# Patient Record
Sex: FEMALE | Race: WHITE | NOT HISPANIC OR LATINO | Employment: PART TIME | URBAN - METROPOLITAN AREA
[De-identification: names, ages, dates, MRNs, and addresses within clinical notes are randomized per-mention and may not be internally consistent; named-entity substitution may affect disease eponyms.]

---
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
## 2020-12-02 ENCOUNTER — HOSPITAL ENCOUNTER (EMERGENCY)
Facility: HOSPITAL | Age: 20
Discharge: HOME | End: 2020-12-02
Attending: EMERGENCY MEDICINE | Admitting: EMERGENCY MEDICINE
Payer: COMMERCIAL

## 2020-12-02 ENCOUNTER — APPOINTMENT (EMERGENCY)
Dept: RADIOLOGY | Facility: HOSPITAL | Age: 20
End: 2020-12-02
Payer: COMMERCIAL

## 2020-12-02 ENCOUNTER — APPOINTMENT (EMERGENCY)
Dept: CT IMAGING | Facility: HOSPITAL | Age: 20
End: 2020-12-02
Payer: COMMERCIAL

## 2020-12-02 VITALS
TEMPERATURE: 97.2 F | RESPIRATION RATE: 16 BRPM | HEART RATE: 87 BPM | DIASTOLIC BLOOD PRESSURE: 81 MMHG | WEIGHT: 154.54 LBS | SYSTOLIC BLOOD PRESSURE: 129 MMHG | OXYGEN SATURATION: 99 % | BODY MASS INDEX: 22.82 KG/M2
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
## 2020-12-02 DIAGNOSIS — M54.2 NECK PAIN: ICD-10-CM

## 2020-12-02 DIAGNOSIS — S16.1XXA STRAIN OF NECK MUSCLE, INITIAL ENCOUNTER: ICD-10-CM

## 2020-12-02 LAB
EXT PREG TEST URINE: NEGATIVE
EXT. CONTROL ED NAV: NORMAL

## 2020-12-02 PROCEDURE — 81025 URINE PREGNANCY TEST: CPT | Performed by: EMERGENCY MEDICINE

## 2020-12-02 PROCEDURE — 73110 X-RAY EXAM OF WRIST: CPT

## 2020-12-02 PROCEDURE — 99284 EMERGENCY DEPT VISIT MOD MDM: CPT

## 2020-12-02 PROCEDURE — 96372 THER/PROPH/DIAG INJ SC/IM: CPT

## 2020-12-02 PROCEDURE — 72125 CT NECK SPINE W/O DYE: CPT

## 2020-12-02 PROCEDURE — 99284 EMERGENCY DEPT VISIT MOD MDM: CPT | Performed by: EMERGENCY MEDICINE

## 2020-12-02 PROCEDURE — 70450 CT HEAD/BRAIN W/O DYE: CPT

## 2020-12-02 RX ORDER — IBUPROFEN 600 MG/1
600 TABLET ORAL EVERY 6 HOURS PRN
Qty: 30 TABLET | Refills: 0 | Status: SHIPPED | OUTPATIENT
Start: 2020-12-02

## 2020-12-02 RX ORDER — CYCLOBENZAPRINE HCL 10 MG
10 TABLET ORAL 2 TIMES DAILY PRN
Qty: 20 TABLET | Refills: 0 | Status: SHIPPED | OUTPATIENT
Start: 2020-12-02

## 2020-12-02 RX ORDER — CYCLOBENZAPRINE HCL 10 MG
10 TABLET ORAL ONCE
Status: COMPLETED | OUTPATIENT
Start: 2020-12-02 | End: 2020-12-02

## 2020-12-02 RX ORDER — KETOROLAC TROMETHAMINE 30 MG/ML
30 INJECTION, SOLUTION INTRAMUSCULAR; INTRAVENOUS ONCE
Status: COMPLETED | OUTPATIENT
Start: 2020-12-02 | End: 2020-12-02

## 2020-12-02 RX ADMIN — CYCLOBENZAPRINE HYDROCHLORIDE 10 MG: 10 TABLET, FILM COATED ORAL at 20:11

## 2020-12-02 RX ADMIN — KETOROLAC TROMETHAMINE 30 MG: 30 INJECTION, SOLUTION INTRAMUSCULAR; INTRAVENOUS at 20:12

## 2022-12-05 ENCOUNTER — TELEPHONE (OUTPATIENT)
Dept: OBGYN CLINIC | Facility: MEDICAL CENTER | Age: 22
End: 2022-12-05

## 2022-12-05 DIAGNOSIS — Z32.01 POSITIVE PREGNANCY TEST: Primary | ICD-10-CM

## 2022-12-05 NOTE — TELEPHONE ENCOUNTER
Pt called stating that she took home  pregnancy test and they were positive, pt was advised of process that we order labs,abt 6 wks after wait for results and then next steps are taken depending on results  Labs were placed for pt to have done  LMP of 10/13/22 was given by patient

## 2022-12-07 ENCOUNTER — TELEPHONE (OUTPATIENT)
Dept: OBGYN CLINIC | Facility: MEDICAL CENTER | Age: 22
End: 2022-12-07

## 2022-12-07 ENCOUNTER — APPOINTMENT (OUTPATIENT)
Dept: LAB | Facility: HOSPITAL | Age: 22
End: 2022-12-07

## 2022-12-07 DIAGNOSIS — Z32.01 POSITIVE PREGNANCY TEST: Primary | ICD-10-CM

## 2022-12-07 DIAGNOSIS — Z32.01 POSITIVE PREGNANCY TEST: ICD-10-CM

## 2022-12-07 LAB
ABO GROUP BLD: NORMAL
B-HCG SERPL-ACNC: ABNORMAL MIU/ML (ref 0–11.6)
BLD GP AB SCN SERPL QL: NEGATIVE
PROGEST SERPL-MCNC: 9.6 NG/ML
RH BLD: POSITIVE
SPECIMEN EXPIRATION DATE: NORMAL

## 2022-12-07 NOTE — TELEPHONE ENCOUNTER
Patient called regarding her blood work results  Explained to patient that once a provider is able to review the results, that someone from the office will reach out to her  Please review when you get a chance  Thank you!

## 2022-12-07 NOTE — TELEPHONE ENCOUNTER
Called patient regarding blood work results, and to schedule a dating ultrasound  Couldn't find an open appointment to schedule in the office, put order in for patient to have ultrasound done with radiology  Informed patient to call Central Scheduling to get appointment scheduled within next week or two  Gave patient number for [facility]  Put order in chart for dating ultrasound

## 2022-12-12 ENCOUNTER — HOSPITAL ENCOUNTER (OUTPATIENT)
Dept: ULTRASOUND IMAGING | Facility: HOSPITAL | Age: 22
Discharge: HOME | End: 2022-12-12
Attending: OBSTETRICS & GYNECOLOGY

## 2022-12-12 DIAGNOSIS — Z32.01 POSITIVE PREGNANCY TEST: ICD-10-CM

## 2022-12-14 NOTE — RESULT ENCOUNTER NOTE
Please call patient with results  US notable for intrauterine pregnancy, 8w0d by this scan   Needs OB Intake and H&P

## 2023-01-06 ENCOUNTER — INITIAL PRENATAL (OUTPATIENT)
Dept: OBGYN CLINIC | Facility: MEDICAL CENTER | Age: 23
End: 2023-01-06

## 2023-01-06 VITALS
BODY MASS INDEX: 28.44 KG/M2 | DIASTOLIC BLOOD PRESSURE: 72 MMHG | WEIGHT: 192 LBS | SYSTOLIC BLOOD PRESSURE: 126 MMHG | HEIGHT: 69 IN

## 2023-01-06 DIAGNOSIS — Z34.01 ENCOUNTER FOR SUPERVISION OF NORMAL FIRST PREGNANCY IN FIRST TRIMESTER: Primary | ICD-10-CM

## 2023-01-06 NOTE — PROGRESS NOTES
OB History    Para Term  AB Living   1             SAB IAB Ectopic Multiple Live Births                  # Outcome Date GA Lbr Cristiano/2nd Weight Sex Delivery Anes PTL Lv   1 Current                  * Pt presents for OB intake  *  *Pt's LMP was Patient's last menstrual period was 10/13/2022 (exact date)  *Ultrasound date:2022   8weeks   *Estimated date of delivery: 2023   * confirmed by lmp    *Signs/Symptoms of Pregnancy   *no Constipation    *no Headaches   *no Cramping  *no Spotting   Diabetes     *no Hx of GDM    *no BMI >35    *no First degree relative with type 2 diabetes   Hypertension-    *no Hx of chronic HTN   *Infection Screening   *no Does the pt have a hx of MRSA? *no History of herpes? *Immunizations:   *yes Discussed influenza vaccine    declined   *yes Discussed TDaP vaccine   *yes COVID Vaccine *declined  [place]  Depression *n   Anxiety*n  Medications*n      *Interview education   *Handouts given:    *Baby and Me support center     *MyChart sign up instructions    *Lab Locations    *[facility] List/Choosing Pediatrician Sheet    *[facility] Childbirth and Parenting Classes    *Schedule for Prenatal Visits    *Pregnancy Warning Signs Reviewed    *Safe Medications During Pregnancy    *SMA and CF Testing information sheet    *CPT Code Sheet/MFM 13week NT pamphlet    *[employer]        SBIRT Screen:0  Depression Screening Follow-up Plan: Patient's depression screening was negative with an [place] score of          *[facility]   *yes discussed genetic testing- pt interested   Patient has been informed of basic prenatal advice such as avoiding alcohol, drugs, and smoking  She should remain hydrated and take daily prenatal vitamins   Patient should avoid caffeine, raw sprouts, high mercury fish, undercooked fish, raw eggs, organ meat, unwashed produce, and unpasteurized cheeses, milk, and fruit juice and undercooked meats  She has been informed about toxoplasmosis and to avoid cat feces  *Details that I feel the provider should be aware of:  Unique Soria was seen for her ob intake at 12w1d  Prenatal panel ordered along with CF/SMA  MFM referral placed    PN1 visit scheduled for *1/20/2023  The patient was oriented to our practice and all questions were answered      Interviewed by:  Shilo Rodriguez

## 2023-01-10 ENCOUNTER — ROUTINE PRENATAL (OUTPATIENT)
Dept: PERINATAL CARE | Facility: OTHER | Age: 23
End: 2023-01-10

## 2023-01-10 ENCOUNTER — APPOINTMENT (OUTPATIENT)
Dept: LAB | Facility: HOSPITAL | Age: 23
End: 2023-01-10
Attending: OBSTETRICS & GYNECOLOGY

## 2023-01-10 VITALS
DIASTOLIC BLOOD PRESSURE: 74 MMHG | HEART RATE: 77 BPM | HEIGHT: 69 IN | SYSTOLIC BLOOD PRESSURE: 134 MMHG | BODY MASS INDEX: 28.41 KG/M2 | WEIGHT: 191.8 LBS

## 2023-01-10 DIAGNOSIS — Z3A.12 12 WEEKS GESTATION OF PREGNANCY: Primary | ICD-10-CM

## 2023-01-10 DIAGNOSIS — Z34.01 ENCOUNTER FOR SUPERVISION OF NORMAL FIRST PREGNANCY IN FIRST TRIMESTER: ICD-10-CM

## 2023-01-10 DIAGNOSIS — Z36.82 NUCHAL TRANSLUCENCY OF FETUS ON PRENATAL ULTRASOUND: ICD-10-CM

## 2023-01-10 DIAGNOSIS — Z13.79 GENETIC SCREENING: ICD-10-CM

## 2023-01-10 LAB
ABO GROUP BLD: NORMAL
BASOPHILS # BLD AUTO: 0.02 THOUSANDS/ÂΜL (ref 0–0.1)
BASOPHILS NFR BLD AUTO: 0 % (ref 0–1)
BILIRUB UR QL STRIP: NEGATIVE
BLD GP AB SCN SERPL QL: NEGATIVE
CLARITY UR: CLEAR
COLOR UR: ABNORMAL
EOSINOPHIL # BLD AUTO: 0.05 THOUSAND/ÂΜL (ref 0–0.61)
EOSINOPHIL NFR BLD AUTO: 1 % (ref 0–6)
ERYTHROCYTE [DISTWIDTH] IN BLOOD BY AUTOMATED COUNT: 12.3 % (ref 11.6–15.1)
GLUCOSE UR STRIP-MCNC: NEGATIVE MG/DL
HCT VFR BLD AUTO: 35.9 % (ref 34.8–46.1)
HGB BLD-MCNC: 12.2 G/DL (ref 11.5–15.4)
HGB UR QL STRIP.AUTO: NEGATIVE
IMM GRANULOCYTES # BLD AUTO: 0.03 THOUSAND/UL (ref 0–0.2)
IMM GRANULOCYTES NFR BLD AUTO: 0 % (ref 0–2)
KETONES UR STRIP-MCNC: ABNORMAL MG/DL
LEUKOCYTE ESTERASE UR QL STRIP: NEGATIVE
LYMPHOCYTES # BLD AUTO: 1.55 THOUSANDS/ÂΜL (ref 0.6–4.47)
LYMPHOCYTES NFR BLD AUTO: 23 % (ref 14–44)
MCH RBC QN AUTO: 30.2 PG (ref 26.8–34.3)
MCHC RBC AUTO-ENTMCNC: 34 G/DL (ref 31.4–37.4)
MCV RBC AUTO: 89 FL (ref 82–98)
MONOCYTES # BLD AUTO: 0.43 THOUSAND/ÂΜL (ref 0.17–1.22)
MONOCYTES NFR BLD AUTO: 6 % (ref 4–12)
NEUTROPHILS # BLD AUTO: 4.81 THOUSANDS/ÂΜL (ref 1.85–7.62)
NEUTS SEG NFR BLD AUTO: 70 % (ref 43–75)
NITRITE UR QL STRIP: NEGATIVE
NRBC BLD AUTO-RTO: 0 /100 WBCS
PH UR STRIP.AUTO: 6.5 [PH]
PLATELET # BLD AUTO: 325 THOUSANDS/UL (ref 149–390)
PMV BLD AUTO: 9.2 FL (ref 8.9–12.7)
PROT UR STRIP-MCNC: NEGATIVE MG/DL
RBC # BLD AUTO: 4.04 MILLION/UL (ref 3.81–5.12)
RH BLD: POSITIVE
SP GR UR STRIP.AUTO: 1.01 (ref 1–1.04)
SPECIMEN EXPIRATION DATE: NORMAL
UROBILINOGEN UA: NEGATIVE MG/DL
WBC # BLD AUTO: 6.89 THOUSAND/UL (ref 4.31–10.16)

## 2023-01-11 PROBLEM — Z3A.12 12 WEEKS GESTATION OF PREGNANCY: Status: ACTIVE | Noted: 2023-01-11

## 2023-01-11 LAB
BACTERIA UR CULT: NORMAL
HBV SURFACE AG SER QL: NORMAL
HIV 1+2 AB+HIV1 P24 AG SERPL QL IA: NORMAL
HIV 2 AB SERPL QL IA: NORMAL
HIV1 AB SERPL QL IA: NORMAL
HIV1 P24 AG SERPL QL IA: NORMAL
RPR SER QL: NORMAL
RUBV IGG SERPL IA-ACNC: 23.2 IU/ML

## 2023-01-16 LAB
CF COMMENT: NORMAL
CFTR MUT ANL BLD/T: NORMAL

## 2023-01-18 LAB
COMMENTX: (FRAGILE X): NORMAL
FMR1 GENE CGG RPT BLD/T QL: NORMAL

## 2023-01-19 LAB
CLINICAL INFO: NORMAL
ETHNIC BACKGROUND STATED: NORMAL
GENE MUT TESTED BLD/T: NORMAL
GENERAL COMMENTS:: NORMAL
LAB DIRECTOR NAME PROVIDER: NORMAL
REASON FOR REFERRAL (NARRATIVE): NORMAL
REF LAB TEST METHOD: NORMAL
SL AMB DISCLAIMER: NORMAL
SL AMB GENETIC COUNSELOR: NORMAL
SMN1 GENE MUT ANL BLD/T: NORMAL
SPECIMEN SOURCE: NORMAL

## 2023-01-20 ENCOUNTER — INITIAL PRENATAL (OUTPATIENT)
Dept: OBGYN CLINIC | Facility: MEDICAL CENTER | Age: 23
End: 2023-01-20

## 2023-01-20 VITALS — WEIGHT: 197.2 LBS | BODY MASS INDEX: 29.12 KG/M2 | SYSTOLIC BLOOD PRESSURE: 132 MMHG | DIASTOLIC BLOOD PRESSURE: 74 MMHG

## 2023-01-20 DIAGNOSIS — Z3A.14 14 WEEKS GESTATION OF PREGNANCY: ICD-10-CM

## 2023-01-20 DIAGNOSIS — Z34.02 ENCOUNTER FOR SUPERVISION OF NORMAL FIRST PREGNANCY IN SECOND TRIMESTER: Primary | ICD-10-CM

## 2023-01-20 NOTE — PROGRESS NOTES
Initial Prenatal Visit  [facility] [address] [place] [address], [place], [address]    Assessment/Plan:  Evon Ma is a 20y o  year old  at 14w1d who presents for initial prenatal visit  Supervision of normal pregnancy  - Prenatal labs reviewed and normal   Blood type: O positive  - SMA, CF, and Fragile X carrier screening was negative  - Aneuploidy screening discussed  Patient declines aneuploidy screening   - Routine cervical cancer screening: Pap has never been done, but patient declines  Recommend Pap at postpartum if patient is declining in pregnancy  - Routine STI Screening: GC/Chlamydia sent today  HIV/Hep B/Syphilis ordered in prenatal panel   - Patient Education: Patient was counseled regarding diet, exercise, weight gain, foods to avoid, vaccines in pregnancy, aneuploidy screening, travel precautions to include seat belt use and VTE risk reduction  She has been provided our pregnancy packet which includes how and when to contact providers, medication recommendations, dietary suggestions, breastfeeding information as well as websites for additional information, hospital and delivery concerns  Additional Pregnancy Problems:   1  Encounter for supervision of normal first pregnancy in second trimester  -     Chlamydia/GC amplified DNA by PCR    2  14 weeks gestation of pregnancy          Subjective:   CC:  Desires prenatal care  Clyde Amador is a 20 y o   female who presents for prenatal care  Pregnancy ROS: Denies leakage of fluid, pelvic pain, or vaginal bleeding  Denies nausea/vomiting      The following portions of the patient's history were reviewed and updated as appropriate: allergies, current medications, past family history, past medical history, obstetric history, gynecologic history, past social history, past surgical history and problem list       Objective:  /74   Wt 89 4 kg (197 lb 3 2 oz)   LMP 10/13/2022 (Exact Date)   BMI 29 12 kg/m² Pregravid Weight/BMI: Pregravid weight not on file (BMI Could not be calculated)  Current Weight: 89 4 kg (197 lb 3 2 oz)   Total Weight Gain: Not found     Pre- Vitals    Flowsheet Row Most Recent Value   Prenatal Assessment    Fetal Heart Rate 157   Prenatal Vitals    Blood Pressure 132/74   Weight - Scale 89 4 kg (197 lb 3 2 oz)   Urine Albumin/Glucose    Dilation/Effacement/Station    Vaginal Drainage    Draining Fluid No   Edema    LLE Edema None   RLE Edema None         General: Well appearing, no distress  Respiratory: Normal respiratory rate, lungs clear to auscultation, no wheezing or rales  Cardiovascular: Regular rate and rhythm, no murmurs, rubs, or gallops  Abdomen: Soft, gravid, nontender    Luke Wilkins MD  [address] [place] [address]  2023 9:50 AM

## 2023-01-21 LAB
C TRACH DNA SPEC QL NAA+PROBE: NEGATIVE
N GONORRHOEA DNA SPEC QL NAA+PROBE: NEGATIVE

## 2023-02-14 ENCOUNTER — ROUTINE PRENATAL (OUTPATIENT)
Dept: OBGYN CLINIC | Facility: MEDICAL CENTER | Age: 23
End: 2023-02-14

## 2023-02-14 VITALS — WEIGHT: 201 LBS | DIASTOLIC BLOOD PRESSURE: 72 MMHG | SYSTOLIC BLOOD PRESSURE: 120 MMHG | BODY MASS INDEX: 29.68 KG/M2

## 2023-02-14 DIAGNOSIS — Z34.02 ENCOUNTER FOR SUPERVISION OF NORMAL FIRST PREGNANCY IN SECOND TRIMESTER: Primary | ICD-10-CM

## 2023-02-14 DIAGNOSIS — Z3A.17 17 WEEKS GESTATION OF PREGNANCY: ICD-10-CM

## 2023-02-14 NOTE — PROGRESS NOTES
Routine Prenatal Visit  [facility] [address] [place] [address]    Assessment/Plan:  Zay Brown is a 20y o  year old  at 17w9d who presents for routine prenatal visit  1  Encounter for supervision of normal first pregnancy in second trimester    2  17 weeks gestation of pregnancy  -     Alpha fetoprotein, maternal; Future          Subjective:     CC: Prenatal care    Aries Swanson is a 20 y o   female who presents for routine prenatal care at 17w5d  Pregnancy ROS: no leakage of fluid, pelvic pain, or vaginal bleeding  some fetal movement  The following portions of the patient's history were reviewed and updated as appropriate: allergies, current medications, past family history, past medical history, obstetric history, gynecologic history, past social history, past surgical history and problem list       Objective:  /72   Wt 91 2 kg (201 lb)   LMP 10/13/2022 (Exact Date)   BMI 29 68 kg/m²   Pregravid Weight/BMI: Pregravid weight not on file (BMI Could not be calculated)  Current Weight: 91 2 kg (201 lb)   Total Weight Gain: Not found  Pre- Vitals    Flowsheet Row Most Recent Value   Prenatal Assessment    Fetal Heart Rate 150   Movement Present   Prenatal Vitals    Blood Pressure 120/72   Weight - Scale 91 2 kg (201 lb)   Urine Albumin/Glucose    Dilation/Effacement/Station    Vaginal Drainage    Edema    LLE Edema None   RLE Edema None   Facial Edema None           General: Well appearing, no distress  Respiratory: Unlabored breathing  Cardiovascular: Regular rate  Abdomen: Soft, gravid, nontender  Fundal Height: Appropriate for gestational age  Extremities: Warm and well perfused  Non tender

## 2023-02-15 ENCOUNTER — TELEPHONE (OUTPATIENT)
Dept: OBGYN CLINIC | Facility: MEDICAL CENTER | Age: 23
End: 2023-02-15

## 2023-02-15 NOTE — TELEPHONE ENCOUNTER
Patient called stating that she was seen in the office yesterday and discuss a problem with acne with you  Patient stated that you mentioned that a gel for acne would be sent to the pharmacy for her, but a prescription was never sent for her  Pharmacy on file, [facility], is correct Pharmacy  Told patient we would reach out back out to her at phone number on file when prescription is sent to pharmacy  Please review when you get a chance  Thank you!

## 2023-02-16 DIAGNOSIS — L70.9 ACNE, UNSPECIFIED ACNE TYPE: Primary | ICD-10-CM

## 2023-02-16 RX ORDER — CLINDAMYCIN PHOSPHATE 10 MG/G
GEL TOPICAL 2 TIMES DAILY
Qty: 60 G | Refills: 1 | Status: SHIPPED | OUTPATIENT
Start: 2023-02-16 | End: 2023-05-16

## 2023-02-21 ENCOUNTER — APPOINTMENT (OUTPATIENT)
Dept: LAB | Facility: HOSPITAL | Age: 23
End: 2023-02-21

## 2023-02-21 DIAGNOSIS — Z3A.17 17 WEEKS GESTATION OF PREGNANCY: ICD-10-CM

## 2023-02-24 LAB
2ND TRIMESTER 4 SCREEN SERPL-IMP: NORMAL
AFP ADJ MOM SERPL: 1.05
AFP INTERP AMN-IMP: NORMAL
AFP INTERP SERPL-IMP: NORMAL
AFP INTERP SERPL-IMP: NORMAL
AFP SERPL-MCNC: 43.9 NG/ML
AGE AT DELIVERY: 24.3 YR
GA METHOD: NORMAL
GA: 18.7 WEEKS
IDDM PATIENT QL: NO
MULTIPLE PREGNANCY: NO
NEURAL TUBE DEFECT RISK FETUS: NORMAL %

## 2023-03-02 ENCOUNTER — ROUTINE PRENATAL (OUTPATIENT)
Dept: PERINATAL CARE | Facility: CLINIC | Age: 23
End: 2023-03-02

## 2023-03-02 VITALS
WEIGHT: 203.7 LBS | DIASTOLIC BLOOD PRESSURE: 64 MMHG | HEART RATE: 88 BPM | SYSTOLIC BLOOD PRESSURE: 124 MMHG | HEIGHT: 69 IN | BODY MASS INDEX: 30.17 KG/M2

## 2023-03-02 DIAGNOSIS — Z3A.20 20 WEEKS GESTATION OF PREGNANCY: ICD-10-CM

## 2023-03-02 DIAGNOSIS — O99.210 OBESITY AFFECTING PREGNANCY, ANTEPARTUM: Primary | ICD-10-CM

## 2023-03-02 DIAGNOSIS — Z36.86 ENCOUNTER FOR ANTENATAL SCREENING FOR CERVICAL LENGTH: ICD-10-CM

## 2023-03-02 NOTE — PROGRESS NOTES
The patient was seen today for an ultrasound  Please see ultrasound report (located under Ob Procedures) for additional details  Thank you very much for allowing us to participate in the care of this very nice patient  Should you have any questions, please do not hesitate to contact me  Jermain Dinh MD [address]  Attending Physician, June

## 2023-03-14 ENCOUNTER — ROUTINE PRENATAL (OUTPATIENT)
Dept: OBGYN CLINIC | Facility: MEDICAL CENTER | Age: 23
End: 2023-03-14

## 2023-03-14 VITALS — DIASTOLIC BLOOD PRESSURE: 74 MMHG | BODY MASS INDEX: 31.1 KG/M2 | WEIGHT: 210.6 LBS | SYSTOLIC BLOOD PRESSURE: 120 MMHG

## 2023-03-14 DIAGNOSIS — Z3A.21 21 WEEKS GESTATION OF PREGNANCY: ICD-10-CM

## 2023-03-14 DIAGNOSIS — Z34.92 SECOND TRIMESTER PREGNANCY: Primary | ICD-10-CM

## 2023-03-14 NOTE — PROGRESS NOTES
Assessment  24 y o   at 21w5d presenting for routine prenatal visit  Plan  Diagnoses and all orders for this visit:    Second trimester pregnancy  21 weeks gestation of pregnancy  - Second trimester precautions  - Level 2 reviewed  - Return in 4wks for PN      ____________________________________________________________        Jam Joyce is a 24 y o   at 21w5d who presents for routine prenatal visit  She is without complaint  She denies contractions, loss of fluid, or vaginal bleeding  She is starting to feel fetal movements; pt able to palpate them, partner cannot yet  Pregnancy Problems:  Patient Active Problem List   Diagnosis   • 21 weeks gestation of pregnancy   • Obesity affecting pregnancy, antepartum         Objective  /74   Wt 95 5 kg (210 lb 9 6 oz)   LMP 10/13/2022 (Exact Date)   BMI 31 10 kg/m²     FHT: 144 BPM   Uterine Size: size equals dates     Physical Exam:  Physical Exam  Constitutional:       General: She is not in acute distress  Appearance: Normal appearance  She is well-developed  She is not ill-appearing, toxic-appearing or diaphoretic  HENT:      Head: Normocephalic and atraumatic  Eyes:      General: No scleral icterus  Right eye: No discharge  Left eye: No discharge  Conjunctiva/sclera: Conjunctivae normal    Pulmonary:      Effort: Pulmonary effort is normal  No accessory muscle usage or respiratory distress  Abdominal:      General: There is distension (gravid)  Tenderness: There is no abdominal tenderness  There is no guarding or rebound  Skin:     General: Skin is warm and dry  Coloration: Skin is not jaundiced  Findings: No bruising, erythema or rash  Neurological:      Mental Status: She is alert  Psychiatric:         Mood and Affect: Mood normal          Behavior: Behavior normal          Thought Content:  Thought content normal          Judgment: Judgment normal

## 2023-05-01 ENCOUNTER — TELEPHONE (OUTPATIENT)
Dept: OBGYN CLINIC | Facility: CLINIC | Age: 23
End: 2023-05-01

## 2023-05-01 ENCOUNTER — CLINICAL SUPPORT (OUTPATIENT)
Dept: POSTPARTUM | Facility: CLINIC | Age: 23
End: 2023-05-01

## 2023-05-01 DIAGNOSIS — Z3A.28 28 WEEKS GESTATION OF PREGNANCY: Primary | ICD-10-CM

## 2023-05-01 DIAGNOSIS — Z34.93 THIRD TRIMESTER PREGNANCY: ICD-10-CM

## 2023-05-01 DIAGNOSIS — Z32.2 ENCOUNTER FOR CHILDBIRTH INSTRUCTION: Primary | ICD-10-CM

## 2023-05-01 NOTE — TELEPHONE ENCOUNTER
Called and left message for patient about 28 week prenatal appointment on 5/2/23  Left message letting patient know that she will need to go to the lab for her glucose testing, that we are no longer drawing blood in the office  Stated that she will need to be at the lab for an hour and that the lab will give her the glucose drink to drink there  Placed lab orders in the chart

## 2023-05-02 ENCOUNTER — APPOINTMENT (OUTPATIENT)
Dept: LAB | Facility: HOSPITAL | Age: 23
End: 2023-05-02

## 2023-05-02 ENCOUNTER — ROUTINE PRENATAL (OUTPATIENT)
Dept: OBGYN CLINIC | Facility: MEDICAL CENTER | Age: 23
End: 2023-05-02

## 2023-05-02 VITALS — SYSTOLIC BLOOD PRESSURE: 110 MMHG | BODY MASS INDEX: 34.05 KG/M2 | DIASTOLIC BLOOD PRESSURE: 70 MMHG | WEIGHT: 230.6 LBS

## 2023-05-02 DIAGNOSIS — Z34.93 THIRD TRIMESTER PREGNANCY: Primary | ICD-10-CM

## 2023-05-02 DIAGNOSIS — Z3A.28 28 WEEKS GESTATION OF PREGNANCY: ICD-10-CM

## 2023-05-02 DIAGNOSIS — Z34.93 THIRD TRIMESTER PREGNANCY: ICD-10-CM

## 2023-05-02 LAB
BASOPHILS # BLD AUTO: 0.04 THOUSANDS/ΜL (ref 0–0.1)
BASOPHILS NFR BLD AUTO: 0 % (ref 0–1)
EOSINOPHIL # BLD AUTO: 0.07 THOUSAND/ΜL (ref 0–0.61)
EOSINOPHIL NFR BLD AUTO: 1 % (ref 0–6)
ERYTHROCYTE [DISTWIDTH] IN BLOOD BY AUTOMATED COUNT: 14 % (ref 11.6–15.1)
GLUCOSE 1H P 50 G GLC PO SERPL-MCNC: 122 MG/DL (ref 40–134)
HCT VFR BLD AUTO: 34.9 % (ref 34.8–46.1)
HGB BLD-MCNC: 11.5 G/DL (ref 11.5–15.4)
IMM GRANULOCYTES # BLD AUTO: 0.07 THOUSAND/UL (ref 0–0.2)
IMM GRANULOCYTES NFR BLD AUTO: 1 % (ref 0–2)
LYMPHOCYTES # BLD AUTO: 1.41 THOUSANDS/ΜL (ref 0.6–4.47)
LYMPHOCYTES NFR BLD AUTO: 13 % (ref 14–44)
MCH RBC QN AUTO: 30 PG (ref 26.8–34.3)
MCHC RBC AUTO-ENTMCNC: 33 G/DL (ref 31.4–37.4)
MCV RBC AUTO: 91 FL (ref 82–98)
MONOCYTES # BLD AUTO: 0.54 THOUSAND/ΜL (ref 0.17–1.22)
MONOCYTES NFR BLD AUTO: 5 % (ref 4–12)
NEUTROPHILS # BLD AUTO: 8.38 THOUSANDS/ΜL (ref 1.85–7.62)
NEUTS SEG NFR BLD AUTO: 80 % (ref 43–75)
NRBC BLD AUTO-RTO: 0 /100 WBCS
PLATELET # BLD AUTO: 354 THOUSANDS/UL (ref 149–390)
PMV BLD AUTO: 9 FL (ref 8.9–12.7)
RBC # BLD AUTO: 3.83 MILLION/UL (ref 3.81–5.12)
WBC # BLD AUTO: 10.51 THOUSAND/UL (ref 4.31–10.16)

## 2023-05-02 NOTE — PROGRESS NOTES
Assessment  24 y o   at 28w5d presenting for routine prenatal visit  Plan  Diagnoses and all orders for this visit:    Third trimester pregnancy  28 weeks gestation of pregnancy  - PTL precautions  - [facility] teaching  - Birth plan given, peds list given, birth consent signed  - 28 wk labs ordered; will be going after visit  - Tdap discussed  Will consider  - Rhogam not indicated  - Return in 2wks for PN    ____________________________________________________________        Geetha Aviles is a 24 y o   at 30w6d who presents for routine prenatal visit  She is without complaint  She denies contractions, loss of fluid, or vaginal bleeding  She feels regular fetal movements  Pregnancy Problems:  Patient Active Problem List   Diagnosis   • 28 weeks gestation of pregnancy   • Obesity affecting pregnancy, antepartum         Objective  /70   Wt 105 kg (230 lb 9 6 oz)   LMP 10/13/2022 (Exact Date)   BMI 34 05 kg/m²     FHT: 150 BPM   Uterine Size: 28 cm     Physical Exam:  Physical Exam  Constitutional:       General: She is not in acute distress  Appearance: Normal appearance  She is well-developed  She is not ill-appearing, toxic-appearing or diaphoretic  HENT:      Head: Normocephalic and atraumatic  Eyes:      General: No scleral icterus  Right eye: No discharge  Left eye: No discharge  Conjunctiva/sclera: Conjunctivae normal    Pulmonary:      Effort: Pulmonary effort is normal  No accessory muscle usage or respiratory distress  Abdominal:      General: There is distension (gravid)  Tenderness: There is no abdominal tenderness  There is no guarding or rebound  Skin:     General: Skin is warm and dry  Coloration: Skin is not jaundiced  Findings: No bruising, erythema or rash  Neurological:      Mental Status: She is alert     Psychiatric:         Mood and Affect: Mood normal          Behavior: Behavior normal          Thought Content: Thought content normal          Judgment: Judgment normal

## 2023-05-16 ENCOUNTER — ROUTINE PRENATAL (OUTPATIENT)
Dept: OBGYN CLINIC | Facility: MEDICAL CENTER | Age: 23
End: 2023-05-16

## 2023-05-16 VITALS — BODY MASS INDEX: 34.7 KG/M2 | SYSTOLIC BLOOD PRESSURE: 128 MMHG | WEIGHT: 235 LBS | DIASTOLIC BLOOD PRESSURE: 60 MMHG

## 2023-05-16 DIAGNOSIS — Z34.03 ENCOUNTER FOR SUPERVISION OF NORMAL FIRST PREGNANCY IN THIRD TRIMESTER: Primary | ICD-10-CM

## 2023-05-16 DIAGNOSIS — Z3A.30 30 WEEKS GESTATION OF PREGNANCY: ICD-10-CM

## 2023-05-16 NOTE — PATIENT INSTRUCTIONS
Fetal Movement   WHAT YOU NEED TO KNOW:   Fetal movements are the kicks, rolls, and hiccups of your unborn baby  You may start to feel these movements when you are 20 weeks pregnant  The movements grow stronger and more frequent as your baby grows  Fetal movements show that your unborn baby is getting the oxygen and nutrients he or she needs before birth  Fewer fetal movements may signal a problem with your baby's health  DISCHARGE INSTRUCTIONS:   Follow up with your doctor or obstetrician as directed:  Write down your questions so you remember to ask them during your visits  Normal fetal movement:  Fetal activity can be described by 4 states, from least to most active  During quiet sleep, your unborn baby may be still for up to 2 hours  During active sleep, he or she kicks, rolls, and moves often  During the quiet awake state, he or she may only move his or her eyes  The active awake state includes strong kicks and rolls  What affects fetal movement:  You may feel your baby move more after you eat, or after you drink caffeine  You may feel your baby move less while you are more active, such as when you exercise  You may also feel fewer movements if you are obese  Certain medicines can change your baby's movements  Tell your healthcare provider about the medicines you are taking  Track fetal movements at home:  Fetal movement is most often felt when you lie quietly on your side  Your healthcare provider may ask you to count movements for 2 hours  He or she may ask you to track how long it takes for your baby to move 10 times  Keep a log of your baby's movements  Contact your doctor or obstetrician if:   It takes longer than usual to feel 8 of your unborn baby's movements  You do not feel your unborn baby move at least 10 times in 2 hours  The skin on your hands, feet, and around your eyes is more swollen than usual     You have a headache for at least 24 hours      Tiny red dots appear on your skin     Your belly is tender when you press on it  You have questions or concerns about your condition or care  Return to the emergency department if:   You do not feel your unborn baby move for 12 hours  You feel cramping or constant pain in your abdomen  You have heavy bleeding from your vagina  You have a severe headache and cannot see clearly  You are having trouble breathing or are vomiting  You have a seizure  © Copyright Des Reddy 2022 Information is for End User's use only and may not be sold, redistributed or otherwise used for commercial purposes  The above information is an  only  It is not intended as medical advice for individual conditions or treatments  Talk to your doctor, nurse or pharmacist before following any medical regimen to see if it is safe and effective for you  Early Labor Signs   WHAT YOU NEED TO KNOW:   Early labor signs can happen weeks, days, or hours before your baby is ready to be delivered  You may have false labor signs, which are also called Beecher Peterson contractions  False labor is common and may happen several weeks or days before your actual labor  The contractions are not regular, and do not get closer together  The pain is usually mild, does not worsen, and is felt only in front  Patrick Peterson contractions may happen later in the day, and stop after you change position, walk, or rest   DISCHARGE INSTRUCTIONS:   Call 911 for any of the following: You have heavy vaginal bleeding  You cannot get to the hospital before the baby starts to come out  Return to the emergency department if:   You have regular, painful contractions that are less than 5 minutes apart and last 30 to 70 seconds each  You have a constant trickle or sudden gush of clear fluid from your vagina  You notice a sudden decrease in your baby's movement      Contact your obstetrician or healthcare provider if:   You have pain in your lower back or abdomen that does not get better when you change positions  You have bloody mucus or show  You have questions or concerns about your condition or care  Early Labor signs and symptoms:   Lightening  occurs when your baby drops inside your pelvis  You may feel increased pressure in your pelvis  This may happen a few weeks to a few hours before your labor begins  Contractions  are cramps and tightening that occur in your uterus to help move the baby through your birth canal  Contractions occur regularly and more often each time  Each one lasts about 30 to 70 seconds, and gets stronger until you deliver your baby  Contractions do not go away with movement  The pain usually starts in your lower back and moves to your abdomen  Effacement  occurs when your cervix softens and thins, so it can easily open for the baby  You will not be able to feel effacement  Your healthcare provider will examine your cervix for effacement  Dilation  is widening of your cervix  Your healthcare provider will examine your cervix for dilation  Your cervix may start to dilate weeks before your baby is delivered  Your cervix will be fully opened and ready for delivery when it is dilated to 10 centimeters  Increased discharge  from your vagina may occur  It may be brown, pink, clear, or slightly bloody  This discharge may also be called bloody show  Bloody show is a mucus plug that forms and blocks your cervix during pregnancy  The discharge may mean that your cervix is opening up and getting ready for delivery  Rupture of membranes  is a sudden release of clear fluid from your vagina  Ruptured membranes means your water broke  Your healthcare provider may need to break your water if it does not happen on its own  © Copyright James Bains 2022 Information is for End User's use only and may not be sold, redistributed or otherwise used for commercial purposes  The above information is an  only   It is not intended as medical advice for individual conditions or treatments  Talk to your doctor, nurse or pharmacist before following any medical regimen to see if it is safe and effective for you

## 2023-05-16 NOTE — PROGRESS NOTES
Routine Prenatal Visit  [facility] [address] [place] [address]    Assessment/Plan:  Neno Henriquez is a 24y o  year old  at 30w7d who presents for routine prenatal visit  1  Encounter for supervision of normal first pregnancy in third trimester    2  30 weeks gestation of pregnancy          Subjective:     CC: Prenatal care    Shahid Salazar is a 24 y o   female who presents for routine prenatal care at 30w5d  Pregnancy ROS: no leakage of fluid, pelvic pain, or vaginal bleeding   good fetal movement  Reports LE swelling, mostly at the end of the day  discussed elevated of LE, compression stockings  Birth plan reviewed  All questions answered    The following portions of the patient's history were reviewed and updated as appropriate: allergies, current medications, past family history, past medical history, obstetric history, gynecologic history, past social history, past surgical history and problem list       Objective:  /60   Wt 107 kg (235 lb)   LMP 10/13/2022 (Exact Date)   BMI 34 70 kg/m²   Pregravid Weight/BMI: Pregravid weight not on file (BMI Could not be calculated)  Current Weight: 107 kg (235 lb)   Total Weight Gain: Not found  Pre-Darren Vitals    Flowsheet Row Most Recent Value   Prenatal Assessment    Fetal Heart Rate 157   Movement Present   Prenatal Vitals    Blood Pressure 128/60   Weight - Scale 107 kg (235 lb)   Urine Albumin/Glucose    Dilation/Effacement/Station    Vaginal Drainage    Edema    LLE Edema Trace   RLE Edema Trace   Facial Edema None           General: Well appearing, no distress  Respiratory: Unlabored breathing  Cardiovascular: Regular rate  Abdomen: Soft, gravid, nontender  Fundal Height: Appropriate for gestational age  Extremities: Warm and well perfused  Non tender

## 2023-05-23 ENCOUNTER — ULTRASOUND (OUTPATIENT)
Dept: PERINATAL CARE | Facility: CLINIC | Age: 23
End: 2023-05-23

## 2023-05-23 VITALS
SYSTOLIC BLOOD PRESSURE: 128 MMHG | BODY MASS INDEX: 35.37 KG/M2 | DIASTOLIC BLOOD PRESSURE: 82 MMHG | WEIGHT: 238.8 LBS | HEART RATE: 98 BPM | HEIGHT: 69 IN

## 2023-05-23 DIAGNOSIS — Z3A.31 31 WEEKS GESTATION OF PREGNANCY: ICD-10-CM

## 2023-05-23 DIAGNOSIS — O36.5930 POOR FETAL GROWTH AFFECTING MANAGEMENT OF MOTHER IN THIRD TRIMESTER, SINGLE OR UNSPECIFIED FETUS: ICD-10-CM

## 2023-05-23 DIAGNOSIS — Z36.89 ENCOUNTER FOR ULTRASOUND TO CHECK FETAL GROWTH: ICD-10-CM

## 2023-05-23 DIAGNOSIS — O99.210 OBESITY AFFECTING PREGNANCY, ANTEPARTUM: Primary | ICD-10-CM

## 2023-05-23 NOTE — PATIENT INSTRUCTIONS
Kick Counts in Pregnancy   WHAT YOU NEED TO KNOW:   What do I need to know about kick counts? Kick counts measure how much your baby is moving in your womb  A kick from your baby can be felt as a twist, turn, swish, roll, or jab  It is common to feel your baby kicking at 26 to 28 weeks of pregnancy  You may feel your baby kick as early as 20 weeks of pregnancy  You may want to start counting at 28 weeks  Why should I measure kick counts? Your baby's movement may provide information about your baby's health  He or she may move less, or not at all, if there are problems  Your baby may move less if he or she is not getting enough oxygen or nutrition from the placenta  Do not smoke while you are pregnant  Smoking decreases the amount of oxygen that gets to your baby  Talk to your healthcare provider if you need help to quit smoking  Tell your healthcare provider as soon as you feel a change in your baby's movements  When do I measure kick counts? Measure kick counts at the same time every day  Measure kick counts when your baby is awake and most active  Your baby may be most active in the evening  How do I measure kick counts? Check that your baby is awake before you measure kick counts  You can wake up your baby by lightly pushing on your belly, walking, or drinking something cold  Your healthcare provider may tell you different ways to measure kick counts  You may be told to do the following:  Use a chart or clock to keep track of the time you start and finish counting  Sit in a chair or lie on your left side  Place your hands on the largest part of your belly  Count until you reach 10 kicks  Write down how much time it takes to count 10 kicks  It may take 30 minutes to 2 hours to count 10 kicks  It should not take more than 2 hours to count 10 kicks  When should I contact my doctor? You feel a change in the number of kicks or movements of your baby       You feel fewer than 10 kicks within 2 hours  You have questions or concerns about your baby's movements  CARE AGREEMENT:   You have the right to help plan your care  Learn about your health condition and how it may be treated  Discuss treatment options with your healthcare providers to decide what care you want to receive  You always have the right to refuse treatment  The above information is an  only  It is not intended as medical advice for individual conditions or treatments  Talk to your doctor, nurse or pharmacist before following any medical regimen to see if it is safe and effective for you  © Copyright [facility] 2022 Information is for End User's use only and may not be sold, redistributed or otherwise used for commercial purposes  Kick Counts in Pregnancy   WHAT YOU NEED TO KNOW:   What do I need to know about kick counts? Kick counts measure how much your baby is moving in your womb  A kick from your baby can be felt as a twist, turn, swish, roll, or jab  It is common to feel your baby kicking at 26 to 28 weeks of pregnancy  You may feel your baby kick as early as 20 weeks of pregnancy  You may want to start counting at 28 weeks  Why should I measure kick counts? Your baby's movement may provide information about your baby's health  He or she may move less, or not at all, if there are problems  Your baby may move less if he or she is not getting enough oxygen or nutrition from the placenta  Do not smoke while you are pregnant  Smoking decreases the amount of oxygen that gets to your baby  Talk to your healthcare provider if you need help to quit smoking  Tell your healthcare provider as soon as you feel a change in your baby's movements  When do I measure kick counts? Measure kick counts at the same time every day  Measure kick counts when your baby is awake and most active  Your baby may be most active in the evening  How do I measure kick counts?   Check that your baby is awake before you measure kick counts  You can wake up your baby by lightly pushing on your belly, walking, or drinking something cold  Your healthcare provider may tell you different ways to measure kick counts  You may be told to do the following:  Use a chart or clock to keep track of the time you start and finish counting  Sit in a chair or lie on your left side  Place your hands on the largest part of your belly  Count until you reach 10 kicks  Write down how much time it takes to count 10 kicks  It may take 30 minutes to 2 hours to count 10 kicks  It should not take more than 2 hours to count 10 kicks  When should I contact my doctor? You feel a change in the number of kicks or movements of your baby  You feel fewer than 10 kicks within 2 hours  You have questions or concerns about your baby's movements  CARE AGREEMENT:   You have the right to help plan your care  Learn about your health condition and how it may be treated  Discuss treatment options with your healthcare providers to decide what care you want to receive  You always have the right to refuse treatment  The above information is an  only  It is not intended as medical advice for individual conditions or treatments  Talk to your doctor, nurse or pharmacist before following any medical regimen to see if it is safe and effective for you  © Copyright Sara Parvez 2022 Information is for End User's use only and may not be sold, redistributed or otherwise used for commercial purposes

## 2023-05-23 NOTE — LETTER
NST sleeve cover sheet    Patient name: Annette Flores  : 2000  MRN: [id number]    MATT: Estimated Date of Delivery: 23    Obstetrician: ______OBGYN    Reason(s) for testing:  ______________FGR      Testing frequency:    ___ 2x/wk  __x_ 1x/wk  __x_ Dopplers  ___ BPP?       Last growth scan: __________________________________________

## 2023-05-23 NOTE — PROGRESS NOTES
"[address]: Ms Nanda Gallegos was seen today for umbilical artery Doppler study, JOSE ALBERTO, NST, and growth ultrasound  See ultrasound report under \"OB Procedures\" tab  MDM:   I  Diagnoses/Problems addressed:  fetal growth restriction  II  Data: glucola, prior US report, no anueploidy screening  III  Risk of morbidity: Low    Please don't hesitate to contact our office with any concerns or questions    -Juanita Boss MD      "

## 2023-05-30 ENCOUNTER — ROUTINE PRENATAL (OUTPATIENT)
Dept: OBGYN CLINIC | Facility: MEDICAL CENTER | Age: 23
End: 2023-05-30

## 2023-05-30 VITALS — DIASTOLIC BLOOD PRESSURE: 82 MMHG | SYSTOLIC BLOOD PRESSURE: 126 MMHG | BODY MASS INDEX: 35.44 KG/M2 | WEIGHT: 240 LBS

## 2023-05-30 DIAGNOSIS — O36.5930 POOR FETAL GROWTH AFFECTING MANAGEMENT OF MOTHER IN THIRD TRIMESTER, SINGLE OR UNSPECIFIED FETUS: ICD-10-CM

## 2023-05-30 DIAGNOSIS — Z3A.32 32 WEEKS GESTATION OF PREGNANCY: Primary | ICD-10-CM

## 2023-05-30 NOTE — PATIENT INSTRUCTIONS
Kick Counts in Pregnancy   WHAT YOU NEED TO KNOW:   Kick counts measure how much your baby is moving in your womb  A kick from your baby can be felt as a twist, turn, swish, roll, or jab  It is common to feel your baby kicking at 26 to 28 weeks of pregnancy  You may feel your baby kick as early as 20 weeks of pregnancy  You may want to start counting at 28 weeks  DISCHARGE INSTRUCTIONS:   Contact your doctor immediately if:   You feel a change in the number of kicks or movements of your baby  You feel fewer than 10 kicks within 2 hours  You have questions or concerns about your baby's movements  Why measure kick counts:  Your baby's movement may provide information about your baby's health  He or she may move less, or not at all, if there are problems  Your baby may move less if he or she is not getting enough oxygen or nutrition from the placenta  Do not smoke while you are pregnant  Smoking decreases the amount of oxygen that gets to your baby  Talk to your healthcare provider if you need help to quit smoking  Tell your healthcare provider as soon as you feel a change in your baby's movements  When to measure kick counts:   Measure kick counts at the same time every day  Measure kick counts when your baby is awake and most active  Your baby may be most active in the evening  How to measure kick counts:  Check that your baby is awake before you measure kick counts  You can wake up your baby by lightly pushing on your belly, walking, or drinking something cold  Your healthcare provider may tell you different ways to measure kick counts  You may be told to do the following:  Use a chart or clock to keep track of the time you start and finish counting  Sit in a chair or lie on your left side  Place your hands on the largest part of your belly  Count until you reach 10 kicks  Write down how much time it takes to count 10 kicks  It may take 30 minutes to 2 hours to count 10 kicks  It should not take more than 2 hours to count 10 kicks  Follow up with your doctor as directed:  Write down your questions so you remember to ask them during your visits  © Copyright Mina Carranza 2022 Information is for End User's use only and may not be sold, redistributed or otherwise used for commercial purposes  The above information is an  only  It is not intended as medical advice for individual conditions or treatments  Talk to your doctor, nurse or pharmacist before following any medical regimen to see if it is safe and effective for you

## 2023-05-30 NOTE — PROGRESS NOTES
Jaspal Acuna is a 24y o  year old  at 30w10d for routine prenatal visit    + FM, no vaginal bleeding, contractions, or LOF  Complaints: No   IUGR - following with MFM weekly ,current recommendation is for IOL from 38 weeks to 38 weeks 6 days - messaged coordinator to schedule for  (PM) / has not been confirmed    Most recent ultrasound and labs reviewed    [address] reviewed

## 2023-05-31 ENCOUNTER — TELEPHONE (OUTPATIENT)
Dept: OBGYN CLINIC | Facility: MEDICAL CENTER | Age: 23
End: 2023-05-31

## 2023-05-31 NOTE — TELEPHONE ENCOUNTER
----- Message from Grandville Councilman, MD sent at 5/30/2023 11:10 AM EDT -----  Regarding: Sandi Garcia can this patient be scheduled for IOL July 6 (PM slot)   Indication is IUGR thanks

## 2023-06-01 ENCOUNTER — ULTRASOUND (OUTPATIENT)
Age: 23
End: 2023-06-01

## 2023-06-01 VITALS
DIASTOLIC BLOOD PRESSURE: 62 MMHG | SYSTOLIC BLOOD PRESSURE: 136 MMHG | HEART RATE: 106 BPM | WEIGHT: 244.6 LBS | BODY MASS INDEX: 36.23 KG/M2 | HEIGHT: 69 IN

## 2023-06-01 DIAGNOSIS — Z3A.33 33 WEEKS GESTATION OF PREGNANCY: ICD-10-CM

## 2023-06-01 DIAGNOSIS — O36.5930 POOR FETAL GROWTH AFFECTING MANAGEMENT OF MOTHER IN THIRD TRIMESTER, SINGLE OR UNSPECIFIED FETUS: ICD-10-CM

## 2023-06-01 DIAGNOSIS — O99.210 OBESITY AFFECTING PREGNANCY, ANTEPARTUM: Primary | ICD-10-CM

## 2023-06-01 NOTE — PROGRESS NOTES
"[facility]: Ms Giuliana Evans was seen today for umbilical artery Doppler study, JOSE ALBERTO, and NST  See ultrasound report under \"OB Procedures\" tab  Please don't hesitate to contact our office with any concerns or questions    -Robinson Jefferson MD      "

## 2023-06-01 NOTE — LETTER
NST sleeve cover sheet    Patient name: Roman Quigley  : 2000  MRN: [id number]    MATT: Estimated Date of Delivery: 23    Obstetrician: [facility] ______________________    Reason(s) for testing:  _________________FGR_____________________      Testing frequency:    ___ 2x/wk  ___ 1x/wk  ___ Dopplers  ___ BPP?       Last growth scan: __________________________________________

## 2023-06-05 ENCOUNTER — CLINICAL SUPPORT (OUTPATIENT)
Dept: POSTPARTUM | Facility: CLINIC | Age: 23
End: 2023-06-05

## 2023-06-05 DIAGNOSIS — Z32.2 ENCOUNTER FOR CHILDBIRTH INSTRUCTION: Primary | ICD-10-CM

## 2023-06-05 NOTE — PROGRESS NOTES
Please refer to the [facility] ultrasound report in Ob Procedures for additional information regarding today's visit

## 2023-06-06 ENCOUNTER — ULTRASOUND (OUTPATIENT)
Age: 23
End: 2023-06-06
Payer: COMMERCIAL

## 2023-06-06 VITALS
HEART RATE: 74 BPM | SYSTOLIC BLOOD PRESSURE: 128 MMHG | BODY MASS INDEX: 35.99 KG/M2 | HEIGHT: 69 IN | WEIGHT: 243 LBS | DIASTOLIC BLOOD PRESSURE: 66 MMHG

## 2023-06-06 DIAGNOSIS — O36.5930 INTRAUTERINE GROWTH RESTRICTION AFFECTING ANTEPARTUM CARE OF MOTHER IN THIRD TRIMESTER, SINGLE OR UNSPECIFIED FETUS: ICD-10-CM

## 2023-06-06 DIAGNOSIS — Z3A.33 33 WEEKS GESTATION OF PREGNANCY: Primary | ICD-10-CM

## 2023-06-06 PROCEDURE — 59025 FETAL NON-STRESS TEST: CPT | Performed by: OBSTETRICS & GYNECOLOGY

## 2023-06-06 PROCEDURE — 76815 OB US LIMITED FETUS(S): CPT | Performed by: OBSTETRICS & GYNECOLOGY

## 2023-06-06 PROCEDURE — 76820 UMBILICAL ARTERY ECHO: CPT | Performed by: OBSTETRICS & GYNECOLOGY

## 2023-06-06 NOTE — LETTER
June 6, 2023     GÓMEZ Morelos  [address]  [address] [facility]    Patient: Maine Kendrick   YOB: 2000   Date of Visit: 6/6/2023       Dear Dr Tavares Early: Thank you for referring Maine Kendrick to me for evaluation  Below are my notes for this consultation  If you have questions, please do not hesitate to call me  I look forward to following your patient along with you           Sincerely,        Giselle Everett MD        CC: No Recipients    Giselle Everett MD  6/5/2023  5:54 PM  Sign when Signing Visit  Please refer to the [facility] ultrasound report in Ob Procedures for additional information regarding today's visit

## 2023-06-13 ENCOUNTER — ROUTINE PRENATAL (OUTPATIENT)
Dept: OBGYN CLINIC | Facility: MEDICAL CENTER | Age: 23
End: 2023-06-13

## 2023-06-13 VITALS — WEIGHT: 243 LBS | DIASTOLIC BLOOD PRESSURE: 82 MMHG | SYSTOLIC BLOOD PRESSURE: 126 MMHG | BODY MASS INDEX: 35.88 KG/M2

## 2023-06-13 DIAGNOSIS — Z3A.34 34 WEEKS GESTATION OF PREGNANCY: Primary | ICD-10-CM

## 2023-06-13 DIAGNOSIS — O36.5930 POOR FETAL GROWTH AFFECTING MANAGEMENT OF MOTHER IN THIRD TRIMESTER, SINGLE OR UNSPECIFIED FETUS: ICD-10-CM

## 2023-06-13 PROCEDURE — PNV: Performed by: OBSTETRICS & GYNECOLOGY

## 2023-06-13 NOTE — PROGRESS NOTES
Edi Valladares is a 24y o  year old  at 35w7d for routine prenatal visit    + FM, no vaginal bleeding, contractions, or LOF  Complaints: No   Most recent ultrasound and labs reviewed    [address] reviewed   Growth restriction - has appt at [facility] /for weekly surveillance doppler and APFS , has IOL scheduled   @ 8 pm

## 2023-06-13 NOTE — PATIENT INSTRUCTIONS
Kick Counts in Pregnancy   WHAT YOU NEED TO KNOW:   Kick counts measure how much your baby is moving in your womb  A kick from your baby can be felt as a twist, turn, swish, roll, or jab  It is common to feel your baby kicking at 26 to 28 weeks of pregnancy  You may feel your baby kick as early as 20 weeks of pregnancy  You may want to start counting at 28 weeks  DISCHARGE INSTRUCTIONS:   Contact your doctor immediately if:   You feel a change in the number of kicks or movements of your baby  You feel fewer than 10 kicks within 2 hours  You have questions or concerns about your baby's movements  Why measure kick counts:  Your baby's movement may provide information about your baby's health  He or she may move less, or not at all, if there are problems  Your baby may move less if he or she is not getting enough oxygen or nutrition from the placenta  Do not smoke while you are pregnant  Smoking decreases the amount of oxygen that gets to your baby  Talk to your healthcare provider if you need help to quit smoking  Tell your healthcare provider as soon as you feel a change in your baby's movements  When to measure kick counts:   Measure kick counts at the same time every day  Measure kick counts when your baby is awake and most active  Your baby may be most active in the evening  How to measure kick counts:  Check that your baby is awake before you measure kick counts  You can wake up your baby by lightly pushing on your belly, walking, or drinking something cold  Your healthcare provider may tell you different ways to measure kick counts  You may be told to do the following:  Use a chart or clock to keep track of the time you start and finish counting  Sit in a chair or lie on your left side  Place your hands on the largest part of your belly  Count until you reach 10 kicks  Write down how much time it takes to count 10 kicks  It may take 30 minutes to 2 hours to count 10 kicks  It should not take more than 2 hours to count 10 kicks  Follow up with your doctor as directed:  Write down your questions so you remember to ask them during your visits  © Copyright [employer] 2022 Information is for End User's use only and may not be sold, redistributed or otherwise used for commercial purposes  The above information is an  only  It is not intended as medical advice for individual conditions or treatments  Talk to your doctor, nurse or pharmacist before following any medical regimen to see if it is safe and effective for you

## 2023-06-15 ENCOUNTER — ULTRASOUND (OUTPATIENT)
Dept: PERINATAL CARE | Facility: CLINIC | Age: 23
End: 2023-06-15
Payer: COMMERCIAL

## 2023-06-15 VITALS
BODY MASS INDEX: 36.26 KG/M2 | SYSTOLIC BLOOD PRESSURE: 130 MMHG | DIASTOLIC BLOOD PRESSURE: 78 MMHG | HEART RATE: 88 BPM | HEIGHT: 69 IN | WEIGHT: 244.8 LBS

## 2023-06-15 DIAGNOSIS — Z3A.35 35 WEEKS GESTATION OF PREGNANCY: ICD-10-CM

## 2023-06-15 DIAGNOSIS — O99.210 OBESITY AFFECTING PREGNANCY, ANTEPARTUM: Primary | ICD-10-CM

## 2023-06-15 PROCEDURE — 76816 OB US FOLLOW-UP PER FETUS: CPT | Performed by: OBSTETRICS & GYNECOLOGY

## 2023-06-15 PROCEDURE — 99213 OFFICE O/P EST LOW 20 MIN: CPT | Performed by: OBSTETRICS & GYNECOLOGY

## 2023-06-15 NOTE — PROGRESS NOTES
A fetal ultrasound was completed  See Ob procedures in Epic for an interpretation and recommendations  Do not hesitate to contact us in [facility] if you have questions  Dilip Phillips MD, [address] [facility]  Maternal Fetal Medicine

## 2023-06-19 ENCOUNTER — CLINICAL SUPPORT (OUTPATIENT)
Age: 23
End: 2023-06-19

## 2023-06-19 DIAGNOSIS — Z32.2 ENCOUNTER FOR CHILDBIRTH INSTRUCTION: Primary | ICD-10-CM

## 2023-06-27 ENCOUNTER — ROUTINE PRENATAL (OUTPATIENT)
Dept: OBGYN CLINIC | Facility: MEDICAL CENTER | Age: 23
End: 2023-06-27

## 2023-06-27 VITALS — BODY MASS INDEX: 36.34 KG/M2 | WEIGHT: 246.1 LBS | SYSTOLIC BLOOD PRESSURE: 114 MMHG | DIASTOLIC BLOOD PRESSURE: 68 MMHG

## 2023-06-27 DIAGNOSIS — Z3A.36 36 WEEKS GESTATION OF PREGNANCY: ICD-10-CM

## 2023-06-27 DIAGNOSIS — Z34.93 THIRD TRIMESTER PREGNANCY: Primary | ICD-10-CM

## 2023-06-27 DIAGNOSIS — O36.5930 POOR FETAL GROWTH AFFECTING MANAGEMENT OF MOTHER IN THIRD TRIMESTER, SINGLE OR UNSPECIFIED FETUS: ICD-10-CM

## 2023-06-27 PROCEDURE — 87150 DNA/RNA AMPLIFIED PROBE: CPT | Performed by: OBSTETRICS & GYNECOLOGY

## 2023-06-27 PROCEDURE — PNV: Performed by: OBSTETRICS & GYNECOLOGY

## 2023-06-27 NOTE — PROGRESS NOTES
Assessment  24 y o   at 44w9d presenting for routine prenatal visit  Plan  Diagnoses and all orders for this visit:    Third trimester pregnancy  36 weeks gestation of pregnancy  -     PTL precautions  - FKC  - Strep B DNA probe, amplification  - Return in 1wk for PN    Poor fetal growth affecting management of mother in third trimester, single or unspecified fetus  - Initial concern for IUGR; growth US last wnl (EFW 62%)  - Continue f/u with MFM and repeat growth upcoming  - NSTs scheduled  - IOL scheduled; may retime pending MFM recs after next US      ____________________________________________________________        Migdalia Alvarez is a 24 y o   at 44w9d who presents for routine prenatal visit  She is without complaint  She has some cramping  Denies regular contractions, loss of fluid, or vaginal bleeding  She feels regular fetal movements  Pregnancy Problems:  Patient Active Problem List   Diagnosis   • 36 weeks gestation of pregnancy   • Obesity affecting pregnancy, antepartum   • Poor fetal growth affecting management of mother in third trimester         Objective  /68   Wt 112 kg (246 lb 1 6 oz)   LMP 10/13/2022 (Exact Date)   BMI 36 34 kg/m²     FHT: 127 BPM   Uterine Size: size equals dates   Presentations: cephalic   Pelvic Exam:     Dilation: 1cm    Effacement: Thick    Station:  -2    Consistency: medium    Position: posterior     Physical Exam:  Physical Exam  Exam conducted with a chaperone present  Constitutional:       General: She is not in acute distress  Appearance: Normal appearance  She is well-developed  She is not ill-appearing, toxic-appearing or diaphoretic  HENT:      Head: Normocephalic and atraumatic  Eyes:      General: No scleral icterus  Right eye: No discharge  Left eye: No discharge        Conjunctiva/sclera: Conjunctivae normal    Pulmonary:      Effort: Pulmonary effort is normal  No accessory muscle usage or respiratory distress  Abdominal:      General: There is distension (gravid)  Tenderness: There is no abdominal tenderness  There is no guarding or rebound  Genitourinary:     General: Normal vulva  Exam position: Lithotomy position  Labia:         Right: No rash, tenderness or lesion  Left: No rash, tenderness or lesion  Urethra: No prolapse, urethral swelling or urethral lesion  Skin:     General: Skin is warm and dry  Coloration: Skin is not jaundiced  Findings: No bruising, erythema or rash  Neurological:      Mental Status: She is alert  Psychiatric:         Mood and Affect: Mood normal          Behavior: Behavior normal          Thought Content:  Thought content normal          Judgment: Judgment normal

## 2023-06-29 ENCOUNTER — ULTRASOUND (OUTPATIENT)
Dept: PERINATAL CARE | Facility: CLINIC | Age: 23
End: 2023-06-29
Payer: COMMERCIAL

## 2023-06-29 VITALS
HEART RATE: 80 BPM | BODY MASS INDEX: 36.91 KG/M2 | SYSTOLIC BLOOD PRESSURE: 126 MMHG | WEIGHT: 249.2 LBS | DIASTOLIC BLOOD PRESSURE: 64 MMHG | HEIGHT: 69 IN

## 2023-06-29 DIAGNOSIS — Z3A.37 37 WEEKS GESTATION OF PREGNANCY: Primary | ICD-10-CM

## 2023-06-29 DIAGNOSIS — O99.210 OBESITY AFFECTING PREGNANCY, ANTEPARTUM: ICD-10-CM

## 2023-06-29 DIAGNOSIS — Z36.89 ENCOUNTER FOR ULTRASOUND TO CHECK FETAL GROWTH: ICD-10-CM

## 2023-06-29 PROBLEM — O36.5930 POOR FETAL GROWTH AFFECTING MANAGEMENT OF MOTHER IN THIRD TRIMESTER: Status: RESOLVED | Noted: 2023-05-23 | Resolved: 2023-06-29

## 2023-06-29 LAB — GP B STREP DNA SPEC QL NAA+PROBE: NEGATIVE

## 2023-06-29 PROCEDURE — 76816 OB US FOLLOW-UP PER FETUS: CPT | Performed by: STUDENT IN AN ORGANIZED HEALTH CARE EDUCATION/TRAINING PROGRAM

## 2023-06-29 NOTE — PROGRESS NOTES
"[address]: Ms Sarai Chatman was seen today for fetal growth assessment ultrasound  See ultrasound report under \"OB Procedures\" tab  Please don't hesitate to contact our office with any concerns or questions    -Mi Flores MD      "

## 2023-07-03 ENCOUNTER — ROUTINE PRENATAL (OUTPATIENT)
Dept: OBGYN CLINIC | Facility: MEDICAL CENTER | Age: 23
End: 2023-07-03

## 2023-07-03 VITALS
WEIGHT: 250.8 LBS | DIASTOLIC BLOOD PRESSURE: 78 MMHG | SYSTOLIC BLOOD PRESSURE: 120 MMHG | HEIGHT: 69 IN | BODY MASS INDEX: 37.15 KG/M2

## 2023-07-03 DIAGNOSIS — Z3A.37 37 WEEKS GESTATION OF PREGNANCY: ICD-10-CM

## 2023-07-03 DIAGNOSIS — O99.210 OBESITY AFFECTING PREGNANCY, ANTEPARTUM: Primary | ICD-10-CM

## 2023-07-03 PROCEDURE — PNV: Performed by: NURSE PRACTITIONER

## 2023-07-03 NOTE — PROGRESS NOTES
Denies loss of fluid, vaginal bleeding and abdominal pain. Confirms frequent fetal movement. Doing fetal kick counts. Tolerating prenatal vitamin well. Recent ultrasound resulted with normal fetal growth reviewed medically indicated IOL no longer necessary. Patient verbalizes understanding.  center ultrasound reviewed-23-Vertex presentation, normal-appearing fetal growth, posterior placenta, JOSE ALBERTO-WNL and EFW 2940g/6 pounds 8 ounces (42%). Recommendation for follow-up as clinically indicated. BP: 120/78  Plan   -continue prenatal vitamins daily  -Continue fetal kick counts daily  -Medically indicated IOL canceled 23. Reviewed with patient can offer elective induction at 39 weeks. Is uncertain at this time.  -Common discomforts of pregnancy and precautions reviewed. Signs and symptoms of labor reviewed.   Written information provided about COVID-19  RTO 1 week

## 2023-07-10 PROBLEM — Z3A.38 38 WEEKS GESTATION OF PREGNANCY: Status: ACTIVE | Noted: 2023-03-02

## 2023-07-12 ENCOUNTER — ROUTINE PRENATAL (OUTPATIENT)
Dept: OBGYN CLINIC | Facility: MEDICAL CENTER | Age: 23
End: 2023-07-12

## 2023-07-12 VITALS
BODY MASS INDEX: 37.47 KG/M2 | SYSTOLIC BLOOD PRESSURE: 128 MMHG | HEIGHT: 69 IN | DIASTOLIC BLOOD PRESSURE: 82 MMHG | WEIGHT: 253 LBS

## 2023-07-12 DIAGNOSIS — O99.210 OBESITY AFFECTING PREGNANCY, ANTEPARTUM: Primary | ICD-10-CM

## 2023-07-12 DIAGNOSIS — Z3A.38 38 WEEKS GESTATION OF PREGNANCY: ICD-10-CM

## 2023-07-12 PROCEDURE — PNV: Performed by: NURSE PRACTITIONER

## 2023-07-12 NOTE — PROGRESS NOTES
Denies loss of fluid, vaginal bleeding and regular contractions. Having  episodes of irregular contractions. Confirms frequent fetal movement. Doing fetal kick counts. Tolerating prenatal vitamin well. Desires elective IOL. Denies questions or concerns at today's visit. BP: 128/82 SVE: 1/70/-3; tolerated well  Plan  -Continue prenatal vitamins daily  -Continue fetal kick counts daily  -Continue vaginal/perineal massage 1-4 times per week  -Message sent to  to schedule eIOL on or after 39 gestational weeks  -Common discomforts of pregnancy and precautions reviewed signs and symptoms to report reviewed.   RTO 1 week

## 2023-07-13 ENCOUNTER — TELEPHONE (OUTPATIENT)
Dept: OBGYN CLINIC | Facility: MEDICAL CENTER | Age: 23
End: 2023-07-13

## 2023-07-13 NOTE — TELEPHONE ENCOUNTER
----- Message from [address] sent at 7/12/2023  1:22 PM EDT -----  Patient is interested in [address] 7/20/23  39w on 7/13/23  SVE 1/70/-3  Arcos score =6    Thank you

## 2023-07-16 ENCOUNTER — HOSPITAL ENCOUNTER (INPATIENT)
Facility: HOSPITAL | Age: 23
LOS: 3 days | Discharge: HOME | End: 2023-07-19
Attending: OBSTETRICS & GYNECOLOGY | Admitting: OBSTETRICS & GYNECOLOGY
Payer: COMMERCIAL

## 2023-07-16 ENCOUNTER — HOSPITAL ENCOUNTER (OUTPATIENT)
Dept: LABOR AND DELIVERY | Facility: HOSPITAL | Age: 23
Discharge: HOME | End: 2023-07-16
Payer: COMMERCIAL

## 2023-07-16 DIAGNOSIS — Z3A.39 39 WEEKS GESTATION OF PREGNANCY: Primary | ICD-10-CM

## 2023-07-16 LAB
ABO GROUP BLD: NORMAL
BLD GP AB SCN SERPL QL: NEGATIVE
ERYTHROCYTE [DISTWIDTH] IN BLOOD BY AUTOMATED COUNT: 16.1 % (ref 11.6–15.1)
HCT VFR BLD AUTO: 33.7 % (ref 34.8–46.1)
HGB BLD-MCNC: 10.8 G/DL (ref 11.5–15.4)
HOLD SPECIMEN: YES
MCH RBC QN AUTO: 28.3 PG (ref 26.8–34.3)
MCHC RBC AUTO-ENTMCNC: 32 G/DL (ref 31.4–37.4)
MCV RBC AUTO: 89 FL (ref 82–98)
PLATELET # BLD AUTO: 326 THOUSANDS/UL (ref 149–390)
PMV BLD AUTO: 9 FL (ref 8.9–12.7)
RBC # BLD AUTO: 3.81 MILLION/UL (ref 3.81–5.12)
RH BLD: POSITIVE
SPECIMEN EXPIRATION DATE: NORMAL
WBC # BLD AUTO: 10.26 THOUSAND/UL (ref 4.31–10.16)

## 2023-07-16 PROCEDURE — 80053 COMPREHEN METABOLIC PANEL: CPT

## 2023-07-16 PROCEDURE — 86780 TREPONEMA PALLIDUM: CPT

## 2023-07-16 PROCEDURE — 86901 BLOOD TYPING SEROLOGIC RH(D): CPT

## 2023-07-16 PROCEDURE — 86900 BLOOD TYPING SEROLOGIC ABO: CPT

## 2023-07-16 PROCEDURE — NC001 PR NO CHARGE: Performed by: OBSTETRICS & GYNECOLOGY

## 2023-07-16 PROCEDURE — 86803 HEPATITIS C AB TEST: CPT

## 2023-07-16 PROCEDURE — 86850 RBC ANTIBODY SCREEN: CPT

## 2023-07-16 PROCEDURE — 85027 COMPLETE CBC AUTOMATED: CPT

## 2023-07-16 RX ORDER — ONDANSETRON 2 MG/ML
4 INJECTION INTRAMUSCULAR; INTRAVENOUS EVERY 4 HOURS PRN
Status: DISCONTINUED | OUTPATIENT
Start: 2023-07-16 | End: 2023-07-18

## 2023-07-16 RX ORDER — BUPIVACAINE HYDROCHLORIDE 2.5 MG/ML
30 INJECTION, SOLUTION EPIDURAL; INFILTRATION; INTRACAUDAL ONCE AS NEEDED
Status: DISCONTINUED | OUTPATIENT
Start: 2023-07-16 | End: 2023-07-18

## 2023-07-16 RX ORDER — SODIUM CHLORIDE, SODIUM LACTATE, POTASSIUM CHLORIDE, CALCIUM CHLORIDE 600; 310; 30; 20 MG/100ML; MG/100ML; MG/100ML; MG/100ML
125 INJECTION, SOLUTION INTRAVENOUS CONTINUOUS
Status: DISCONTINUED | OUTPATIENT
Start: 2023-07-16 | End: 2023-07-18

## 2023-07-16 RX ADMIN — Medication 25 MCG: at 22:01

## 2023-07-17 ENCOUNTER — ANESTHESIA EVENT (INPATIENT)
Dept: ANESTHESIOLOGY | Facility: HOSPITAL | Age: 23
End: 2023-07-17
Payer: COMMERCIAL

## 2023-07-17 ENCOUNTER — ANESTHESIA (INPATIENT)
Dept: ANESTHESIOLOGY | Facility: HOSPITAL | Age: 23
End: 2023-07-17
Payer: COMMERCIAL

## 2023-07-17 LAB
ALBUMIN SERPL BCP-MCNC: 3.4 G/DL (ref 3.5–5)
ALP SERPL-CCNC: 101 U/L (ref 34–104)
ALT SERPL W P-5'-P-CCNC: 6 U/L (ref 7–52)
ANION GAP SERPL CALCULATED.3IONS-SCNC: 9 MMOL/L
AST SERPL W P-5'-P-CCNC: 12 U/L (ref 13–39)
BILIRUB SERPL-MCNC: 0.47 MG/DL (ref 0.2–1)
BUN SERPL-MCNC: 14 MG/DL (ref 5–25)
CALCIUM ALBUM COR SERPL-MCNC: 9.4 MG/DL (ref 8.3–10.1)
CALCIUM SERPL-MCNC: 8.9 MG/DL (ref 8.4–10.2)
CHLORIDE SERPL-SCNC: 105 MMOL/L (ref 96–108)
CO2 SERPL-SCNC: 21 MMOL/L (ref 21–32)
CREAT SERPL-MCNC: 0.46 MG/DL (ref 0.6–1.3)
CREAT UR-MCNC: 28.4 MG/DL
GFR SERPL CREATININE-BSD FRML MDRD: 139 ML/MIN/1.73SQ M
GLUCOSE SERPL-MCNC: 86 MG/DL (ref 65–140)
HCV AB SER QL: NORMAL
POTASSIUM SERPL-SCNC: 4.1 MMOL/L (ref 3.5–5.3)
PROT SERPL-MCNC: 6.5 G/DL (ref 6.4–8.4)
PROT UR-MCNC: 6 MG/DL
PROT/CREAT UR: 0.21 MG/G{CREAT} (ref 0–0.1)
SODIUM SERPL-SCNC: 135 MMOL/L (ref 135–147)
TREPONEMA PALLIDUM IGG+IGM AB [PRESENCE] IN SERUM OR PLASMA BY IMMUNOASSAY: NORMAL

## 2023-07-17 PROCEDURE — 82570 ASSAY OF URINE CREATININE: CPT

## 2023-07-17 PROCEDURE — 84156 ASSAY OF PROTEIN URINE: CPT

## 2023-07-17 RX ORDER — OXYTOCIN/RINGER'S LACTATE 30/500 ML
1-30 PLASTIC BAG, INJECTION (ML) INTRAVENOUS
Status: DISCONTINUED | OUTPATIENT
Start: 2023-07-17 | End: 2023-07-18

## 2023-07-17 RX ORDER — CALCIUM CARBONATE 500 MG/1
500 TABLET, CHEWABLE ORAL DAILY PRN
Status: DISCONTINUED | OUTPATIENT
Start: 2023-07-17 | End: 2023-07-18

## 2023-07-17 RX ADMIN — MORPHINE SULFATE 2 MG: 2 INJECTION, SOLUTION INTRAMUSCULAR; INTRAVENOUS at 19:30

## 2023-07-17 RX ADMIN — SODIUM CHLORIDE, SODIUM LACTATE, POTASSIUM CHLORIDE, AND CALCIUM CHLORIDE 999 ML/HR: .6; .31; .03; .02 INJECTION, SOLUTION INTRAVENOUS at 23:35

## 2023-07-17 RX ADMIN — SODIUM CHLORIDE, SODIUM LACTATE, POTASSIUM CHLORIDE, AND CALCIUM CHLORIDE 125 ML/HR: .6; .31; .03; .02 INJECTION, SOLUTION INTRAVENOUS at 18:29

## 2023-07-17 RX ADMIN — ONDANSETRON 4 MG: 2 INJECTION INTRAMUSCULAR; INTRAVENOUS at 17:27

## 2023-07-17 RX ADMIN — ONDANSETRON 4 MG: 2 INJECTION INTRAMUSCULAR; INTRAVENOUS at 01:53

## 2023-07-17 RX ADMIN — SODIUM CHLORIDE, SODIUM LACTATE, POTASSIUM CHLORIDE, AND CALCIUM CHLORIDE 125 ML/HR: .6; .31; .03; .02 INJECTION, SOLUTION INTRAVENOUS at 02:23

## 2023-07-17 RX ADMIN — MORPHINE SULFATE 2 MG: 2 INJECTION, SOLUTION INTRAMUSCULAR; INTRAVENOUS at 10:27

## 2023-07-17 RX ADMIN — MORPHINE SULFATE 2 MG: 2 INJECTION, SOLUTION INTRAMUSCULAR; INTRAVENOUS at 02:27

## 2023-07-17 RX ADMIN — ONDANSETRON 4 MG: 2 INJECTION INTRAMUSCULAR; INTRAVENOUS at 23:24

## 2023-07-17 RX ADMIN — CALCIUM CARBONATE (ANTACID) CHEW TAB 500 MG 500 MG: 500 CHEW TAB at 10:53

## 2023-07-17 RX ADMIN — Medication 2 MILLI-UNITS/MIN: at 02:32

## 2023-07-17 RX ADMIN — SODIUM CHLORIDE, SODIUM LACTATE, POTASSIUM CHLORIDE, AND CALCIUM CHLORIDE 125 ML/HR: .6; .31; .03; .02 INJECTION, SOLUTION INTRAVENOUS at 10:27

## 2023-07-17 NOTE — OB LABOR/OXYTOCIN SAFETY PROGRESS
Oxytocin Safety Progress Check Note - Bibiana Mcdowell 24 y.o. female MRN: [id number]    Unit/Bed#: L&D 322-01 Encounter: [id number]    Dose (luan-units/min) Oxytocin: 16 luan-units/min  Contraction Frequency (minutes): irritability  Contraction Quality: Mild  Tachysystole: No   Cervical Dilation: 4-5        Cervical Effacement: 60  Fetal Station: -2  Baseline Rate: 130 bpm  Fetal Heart Rate: 135 BPM  FHR Category: Category I               Vital Signs:   Vitals:    07/17/23 0800   BP: 109/59   Pulse: 93   Resp:    Temp:        Notes/comments:   Upon evaluation patient resting comfortably in bed. FHR category I as above. Pitocin at 16; patient still only with uterine irritability and not in a contraction pattern yet. Will continue to titrate pitocin. Cervical exam deferred at this time, will plan for check in 2hrs.         Clif Oropeza MD 7/17/2023 8:22 AM

## 2023-07-17 NOTE — OB LABOR/OXYTOCIN SAFETY PROGRESS
Labor Progress Note - Mercedez Reyes 24 y.o. female MRN: [id number]    Unit/Bed#: L&D 322-01 Encounter: [id number]       Contraction Frequency (minutes): irregular  Contraction Quality: Mild      Cervical Dilation: 4        Cervical Effacement: 60  Fetal Station: -2  Baseline Rate: 130 bpm     FHR Category: Category I               Vital Signs:   Vitals:    07/16/23 2303   BP: 135/72   Pulse: 93   Resp:    Temp:        Notes/comments: S/p sutherland balloon, patient uncomfortable and feeling contractions. Giving morphine 2mg by IV and starting pitocin.         Alan Alexander MD 7/17/2023 2:12 AM

## 2023-07-17 NOTE — H&P
[address] [facility] 24 y.o. female MRN: [id number]  Unit/Bed#: L&D 322-01 Encounter: [id number]    Assessment: 24 y.o.  at 39w3d admitted for elective IOL. SVE: 1.5/60/-3  FHT: Cat I  Clinical EFW: 7 ; Cephalic confirmed by ultrasound  GBS status: neg   Postpartum contraception plan: declines    Plan:   * 39 weeks gestation of pregnancy  Assessment & Plan  Admit for elective induction of labor  T&S, CBC, RPR  CLD  IV fluids  GBS prophylaxis is not needed  Induction with     Obesity affecting pregnancy, antepartum  Assessment & Plan  BMI 37        Discussed case and plan w/ Dr. Heidi Phillips      Chief Complaint: I'm here for my induction    HPI: Makayla Mcmanus is a 24 y.o.  with an MATT of 2023, by Last Menstrual Period at 39w3d who is being admitted for induction. She denies having uterine contractions, has no LOF, and reports no VB. She states she has felt good FM. Patient Active Problem List   Diagnosis   • 39 weeks gestation of pregnancy   • Obesity affecting pregnancy, antepartum       Baby complications/comments: none    Review of Systems   Constitutional: Negative for chills and fever. Respiratory: Negative for cough and shortness of breath. Cardiovascular: Negative for chest pain and leg swelling. Gastrointestinal: Negative for abdominal pain, nausea and vomiting. Genitourinary: Negative for dysuria, pelvic pain, urgency, vaginal bleeding and vaginal discharge. Neurological: Negative for dizziness, light-headedness and headaches. All other systems reviewed and are negative.       OB Hx:  OB History    Para Term  AB Living   1             SAB IAB Ectopic Multiple Live Births                  # Outcome Date GA Lbr Cristiano/2nd Weight Sex Delivery Anes PTL Lv   1 Current                Past Medical Hx:  Past Medical History:   Diagnosis Date   • Varicella        Past Surgical hx:  Past Surgical History:   Procedure Laterality Date   • EYE SURGERY   Social Hx:  Alcohol use: no  Tobacco use: no  Other substance use: no    No Known Allergies    Medications Prior to Admission   Medication   • ADVANCED EVENING PRIMROSE OIL PO   • Prenatal Vit-Fe Fumarate-FA (PRENATAL VITAMINS PO)       Objective:  Temp:  [98.7 °F (37.1 °C)] 98.7 °F (37.1 °C)  HR:  [] 87  Resp:  [18] 18  BP: (131-136)/(75-81) 136/81  Body mass index is 37.36 kg/m². Physical Exam:  Physical Exam  Constitutional:       Appearance: Normal appearance. Cardiovascular:      Rate and Rhythm: Normal rate and regular rhythm. Heart sounds: No murmur heard. No friction rub. No gallop. Pulmonary:      Effort: Pulmonary effort is normal. No respiratory distress. Breath sounds: No wheezing. Abdominal:      Palpations: Abdomen is soft. Tenderness: There is no abdominal tenderness. Musculoskeletal:         General: No swelling or tenderness. Neurological:      Mental Status: She is alert and oriented to person, place, and time. Skin:     General: Skin is warm and dry. Psychiatric:         Mood and Affect: Mood normal.   Vitals reviewed. FHT:   130 bpm, moderate variability, accelerations present, decelerations absent    TOCO:    no contractions detectable    Lab Results   Component Value Date    WBC 10.26 (H) 07/16/2023    HGB 10.8 (L) 07/16/2023    HCT 33.7 (L) 07/16/2023     07/16/2023     No results found for: "NA", "K", "CL", "CO2", "BUN", "CREATININE", "GLUCOSE", "AST", "ALT"  Prenatal Labs: Reviewed      Blood type: O Pos  Antibody: Neg  GBS: Neg  HIV: NR  Rubella: Immune  Syphilis IgM/IgG: Neg  HBsAg: Neg  HCAb: pending  Chlamydia: neg  Gonorrhea: neg  Diabetes 1 hour screen: 122  Platelets: pending  Hgb: pending  >2 Midnights  INPATIENT     Signature/Title:  Avila Villalobos MD  Date: 7/16/2023  Time: 10:06 PM

## 2023-07-17 NOTE — PLAN OF CARE
Problem: ANTEPARTUM  Goal: Maintain pregnancy as long as maternal and/or fetal condition is stable  Description: INTERVENTIONS:  - Maternal surveillance  - Fetal surveillance  - Monitor uterine activity  - Medications as ordered  - Bedrest  Outcome: Progressing     Problem: BIRTH - VAGINAL/ SECTION  Goal: Fetal and maternal status remain reassuring during the birth process  Description: INTERVENTIONS:  - Monitor vital signs  - Monitor fetal heart rate  - Monitor uterine activity  - Monitor labor progression (vaginal delivery)  - DVT prophylaxis  - Antibiotic prophylaxis  Outcome: Progressing  Goal: Emotionally satisfying birthing experience for mother/fetus  Description: Interventions:  - Assess, plan, implement and evaluate the nursing care given to the patient in labor  - Advocate the philosophy that each childbirth experience is a unique experience and support the family's chosen level of involvement and control during the labor process   - Actively participate in both the patient's and family's teaching of the birth process  - Consider cultural, Zoroastrian and age-specific factors and plan care for the patient in labor  Outcome: Progressing     Problem: Knowledge Deficit  Goal: Verbalizes understanding of labor plan  Description: Assess patient/family/caregiver's baseline knowledge level and ability to understand information. Provide education via patient/family/caregiver's preferred learning method at appropriate level of understanding. 1. Provide teaching at level of understanding. 2. Provide teaching via preferred learning method(s). Outcome: Progressing     Problem: Labor & Delivery  Goal: Manages discomfort  Description: Assess and monitor for signs and symptoms of discomfort. Assess patient's pain level regularly and per hospital policy. Administer medications as ordered.  Support use of nonpharmacological methods to help control pain such as distraction, imagery, relaxation, and application of heat and cold. Collaborate with interdisciplinary team and patient to determine appropriate pain management plan. 1. Include patient in decisions related to comfort. 2. Offer non-pharmacological pain management interventions. 3. Report ineffective pain management to physician. Outcome: Progressing  Goal: Patient vital signs are stable  Description: 1. Assess vital signs - vaginal delivery.   Outcome: Progressing

## 2023-07-17 NOTE — OB LABOR/OXYTOCIN SAFETY PROGRESS
Oxytocin Safety Progress Check Note - Creed Terrance 24 y.o. female MRN: [id number]    Unit/Bed#: L&D 322-01 Encounter: [id number]    Dose (luan-units/min) Oxytocin: 26 luan-units/min  Contraction Frequency (minutes): 1.5-3.5  Contraction Quality: Moderate  Tachysystole: No   Cervical Dilation: 5        Cervical Effacement: 80  Fetal Station: -1  Baseline Rate: 120 bpm  Fetal Heart Rate: 120 BPM  FHR Category: Category I               Vital Signs:   Vitals:    07/17/23 1836   BP: 131/71   Pulse: 94   Resp:    Temp:        Notes/comments: Pt requesting IV pain medication. 2mg morphine ordered.         Nai Rockwell MD 7/17/2023 7:24 PM

## 2023-07-17 NOTE — OB LABOR/OXYTOCIN SAFETY PROGRESS
Labor Progress Note - Deniz Pathak 24 y.o. female MRN: [id number]    Unit/Bed#: L&D 322-01 Encounter: [id number]                 Cervical Dilation: 1-2        Cervical Effacement: 61  Fetal Station: -3                        Vital Signs:   Vitals:    07/16/23 2204   BP: 136/81   Pulse: 87   Resp:    Temp:        Notes/comments:     PROCEDURE:  SUTHERLAND BALLOON PLACEMENT    A 24F sutherland with a 30cc balloon was selected, SVE was performed and cervix was located, sutherland was introduced over sterile gloved hands. Balloon advanced through cervix beyond the internal cervical os. A small amount amount of sterile saline solution was instilled in the balloon to confirm placement. Placement was confirmed to be beyond the internal cervical os. A total of 60cc of sterile saline solution was placed into the balloon. Pt tolerated well. Instructions left with RN to place sutherland to gravity with a 1L bag of IV fluid. Notify MD when sutherland dislodged. Cytotec placed after sutherland balloon.     MD Heriberto Quintana MD 7/16/2023 10:07 PM

## 2023-07-17 NOTE — OB LABOR/OXYTOCIN SAFETY PROGRESS
Oxytocin Safety Progress Check Note - Monae Delay 24 y.o. female MRN: [id number]    Unit/Bed#: L&D 322-01 Encounter: [id number]    Dose (luan-units/min) Oxytocin: 10 luan-units/min  Contraction Frequency (minutes):  (diff picking up at times; pt on her side; adj toco)  Contraction Quality: Mild  Tachysystole: No   Cervical Dilation: 4-5        Cervical Effacement: 60  Fetal Station: -2  Baseline Rate: 120 bpm     FHR Category: Category I               Vital Signs:   Vitals:    07/17/23 0538   BP: 131/75   Pulse: 93   Resp:    Temp:        Notes/comments: Pt comfortable, continue titrating pitocin. Seen with Dr. Lance Berumen.         Justyn Gilbert MD 7/17/2023 6:21 AM

## 2023-07-17 NOTE — OB LABOR/OXYTOCIN SAFETY PROGRESS
Oxytocin Safety Progress Check Note - Jocelyne Watts 24 y.o. female MRN: [id number]    Unit/Bed#: L&D 322-01 Encounter: [id number]    Dose (luan-units/min) Oxytocin: 20 luan-units/min  Contraction Frequency (minutes): 2-3  Contraction Quality: Mild  Tachysystole: No   Cervical Dilation: 5        Cervical Effacement: 80  Fetal Station: -2  Baseline Rate: 120 bpm  Fetal Heart Rate: 125 BPM (patient sitting up to eat)  FHR Category: Category I               Vital Signs:   Vitals:    07/17/23 1406   BP: 128/79   Pulse: 97   Resp:    Temp:        Notes/comments:   Patient resting comfortably before exam. On exam pt 5/80/-2, with tocometry picking up contractions every 2-3 minutes. FHR category I as above. Plan to continue titrating pitocin.         Liliana Rice MD 7/17/2023 2:11 PM

## 2023-07-17 NOTE — OB LABOR/OXYTOCIN SAFETY PROGRESS
Oxytocin Safety Progress Check Note - Samantha Teagues 24 y.o. female MRN: [id number]    Unit/Bed#: L&D 322-01 Encounter: [id number]    Dose (luan-units/min) Oxytocin: 24 luan-units/min  Contraction Frequency (minutes): 2-2.5  Contraction Quality: Mild  Tachysystole: No   Cervical Dilation: 5        Cervical Effacement: 80  Fetal Station: -2  Baseline Rate: 120 bpm  Fetal Heart Rate: 120 BPM  FHR Category: Category I           Vital Signs:   Vitals:    07/17/23 1642   BP:    Pulse:    Resp:    Temp: 97.9 °F (36.6 °C)       Notes/comments:   Patient feeling pain with contractions. SVE as above. Fetal heart tracing category 1 with 120 baseline with moderate variability, accelerations, no decelerations. IUPC placed at this time to better visualize contractions. We will continue titrating Pitocin at this time. Dr. Anitha sanders.     Gianna Riggins MD 7/17/2023 5:07 PM

## 2023-07-17 NOTE — OB LABOR/OXYTOCIN SAFETY PROGRESS
Oxytocin Safety Progress Check Note - Emily Hannon 24 y.o. female MRN: [id number]    Unit/Bed#: L&D 322-01 Encounter: [id number]    Dose (luan-units/min) Oxytocin: 8 luan-units/min  Contraction Frequency (minutes):  (diff picking up at times; pt on her side; adj toco)  Contraction Quality: Mild  Tachysystole: No   Cervical Dilation: 4        Cervical Effacement: 60  Fetal Station: -2  Baseline Rate: 115 bpm     FHR Category: Category I               Vital Signs:   Vitals:    07/17/23 0508   BP: 131/56   Pulse: 101   Resp:    Temp:        Notes/comments: Pt comfortable, Cat I, check deferred        Edman Hamman, MD 7/17/2023 5:12 AM

## 2023-07-17 NOTE — OB LABOR/OXYTOCIN SAFETY PROGRESS
Oxytocin Safety Progress Check Note - Natalie Krishnamurthy 24 y.o. female MRN: [id number]    Unit/Bed#: L&D 322-01 Encounter: [id number]    Dose (luan-units/min) Oxytocin: 18 luan-units/min  Contraction Frequency (minutes): every 3-4, irritability  Contraction Quality: Mild  Tachysystole: No   Cervical Dilation: 4-5        Cervical Effacement: 70  Fetal Station: -2  Baseline Rate: 140 bpm  Fetal Heart Rate: 125 BPM (patient sitting up to eat)  FHR Category: Category I               Vital Signs  Vitals:    07/17/23 0945   BP: 123/69   Pulse: 96   Resp:    Temp:        Notes/comments:   Upon evaluation patient resting comfortably. Cervical exam 4.5/60/-2 as above. Amniotomy performed with clear fluid. FHR category I as above. Patient tolerated the check and amniotomy well.     Discussed with Dr. Dagmar Jauregui MD 7/17/2023 10:04 AM

## 2023-07-17 NOTE — ASSESSMENT & PLAN NOTE
• Routine postpartum care  • Encourage ambulation  • Encourage familial bonding  • Lactation support as needed  • Pain: Motrin/Tylenol around the clock, oxycodone if needed

## 2023-07-17 NOTE — PLAN OF CARE
Problem: ANTEPARTUM  Goal: Maintain pregnancy as long as maternal and/or fetal condition is stable  Description: INTERVENTIONS:  - Maternal surveillance  - Fetal surveillance  - Monitor uterine activity  - Medications as ordered  - Bedrest  2023 by Kimberly Gonzalez MA  Outcome: Progressing  2023 by Kimberly Gonzalez MA  Outcome: Progressing     Problem: BIRTH - VAGINAL/ SECTION  Goal: Fetal and maternal status remain reassuring during the birth process  Description: INTERVENTIONS:  - Monitor vital signs  - Monitor fetal heart rate  - Monitor uterine activity  - Monitor labor progression (vaginal delivery)  - DVT prophylaxis  - Antibiotic prophylaxis  2023 by Kimberly Gonzalez MA  Outcome: Progressing  2023 by Kimberly Gonzalez MA  Outcome: Progressing  Goal: Emotionally satisfying birthing experience for mother/fetus  Description: Interventions:  - Assess, plan, implement and evaluate the nursing care given to the patient in labor  - Advocate the philosophy that each childbirth experience is a unique experience and support the family's chosen level of involvement and control during the labor process   - Actively participate in both the patient's and family's teaching of the birth process  - Consider cultural, Latter-day and age-specific factors and plan care for the patient in labor  2023 by Kimberly Gonzalez MA  Outcome: Progressing  2023 by Kimberly Gonzalez MA  Outcome: Progressing     Problem: Knowledge Deficit  Goal: Verbalizes understanding of labor plan  Description: Assess patient/family/caregiver's baseline knowledge level and ability to understand information. Provide education via patient/family/caregiver's preferred learning method at appropriate level of understanding. 1. Provide teaching at level of understanding. 2. Provide teaching via preferred learning method(s).   2023 by Kimberly Gonzalez MA  Outcome: Progressing  7/17/2023 1202 by Malia Mckeon MA  Outcome: Progressing     Problem: Labor & Delivery  Goal: Manages discomfort  Description: Assess and monitor for signs and symptoms of discomfort. Assess patient's pain level regularly and per hospital policy. Administer medications as ordered. Support use of nonpharmacological methods to help control pain such as distraction, imagery, relaxation, and application of heat and cold. Collaborate with interdisciplinary team and patient to determine appropriate pain management plan. 1. Include patient in decisions related to comfort. 2. Offer non-pharmacological pain management interventions. 3. Report ineffective pain management to physician. 7/17/2023 1202 by Malia Mckeon MA  Outcome: Progressing  7/17/2023 1202 by Malia Mckeon MA  Outcome: Progressing  Goal: Patient vital signs are stable  Description: 1. Assess vital signs - vaginal delivery.   7/17/2023 1202 by Malia Mckeon MA  Outcome: Progressing  7/17/2023 1202 by Malia Mckeon MA  Outcome: Progressing

## 2023-07-18 PROBLEM — O13.9 GESTATIONAL HYPERTENSION: Status: ACTIVE | Noted: 2023-07-18

## 2023-07-18 LAB
BASE EXCESS BLDCOA CALC-SCNC: -0.6 MMOL/L (ref 3–11)
BASE EXCESS BLDCOV CALC-SCNC: -3.4 MMOL/L (ref 1–9)
HCO3 BLDCOA-SCNC: 26.2 MMOL/L (ref 17.3–27.3)
HCO3 BLDCOV-SCNC: 20.7 MMOL/L (ref 12.2–28.6)
O2 CT VFR BLDCOA CALC: 9.7 ML/DL
OXYHGB MFR BLDCOA: 40.7 %
OXYHGB MFR BLDCOV: 69.2 %
PCO2 BLDCOA: 51 MM[HG] (ref 30–60)
PCO2 BLDCOV: 35 MM HG (ref 27–43)
PH BLDCOA: 7.33 [PH] (ref 7.23–7.43)
PH BLDCOV: 7.39 [PH] (ref 7.19–7.49)
PO2 BLDCOA: 19.2 MM HG (ref 5–25)
PO2 BLDCOV: 28.5 MM HG (ref 15–45)
SAO2 % BLDCOV: 16 ML/DL

## 2023-07-18 PROCEDURE — 82805 BLOOD GASES W/O2 SATURATION: CPT | Performed by: OBSTETRICS & GYNECOLOGY

## 2023-07-18 PROCEDURE — 3E0P7VZ INTRODUCTION OF HORMONE INTO FEMALE REPRODUCTIVE, VIA NATURAL OR ARTIFICIAL OPENING: ICD-10-PCS | Performed by: OBSTETRICS & GYNECOLOGY

## 2023-07-18 PROCEDURE — 3E033VJ INTRODUCTION OF OTHER HORMONE INTO PERIPHERAL VEIN, PERCUTANEOUS APPROACH: ICD-10-PCS | Performed by: OBSTETRICS & GYNECOLOGY

## 2023-07-18 PROCEDURE — 4A0HXCZ MEASUREMENT OF PRODUCTS OF CONCEPTION, CARDIAC RATE, EXTERNAL APPROACH: ICD-10-PCS | Performed by: OBSTETRICS & GYNECOLOGY

## 2023-07-18 PROCEDURE — 59400 OBSTETRICAL CARE: CPT | Performed by: OBSTETRICS & GYNECOLOGY

## 2023-07-18 PROCEDURE — 10907ZU DRAINAGE OF AMNIOTIC FLUID, DIAGNOSTIC FROM PRODUCTS OF CONCEPTION, VIA NATURAL OR ARTIFICIAL OPENING: ICD-10-PCS | Performed by: OBSTETRICS & GYNECOLOGY

## 2023-07-18 PROCEDURE — 10H07YZ INSERTION OF OTHER DEVICE INTO PRODUCTS OF CONCEPTION, VIA NATURAL OR ARTIFICIAL OPENING: ICD-10-PCS | Performed by: OBSTETRICS & GYNECOLOGY

## 2023-07-18 RX ORDER — ROPIVACAINE HYDROCHLORIDE 2 MG/ML
INJECTION, SOLUTION EPIDURAL; INFILTRATION; PERINEURAL AS NEEDED
Status: DISCONTINUED | OUTPATIENT
Start: 2023-07-18 | End: 2023-07-18 | Stop reason: HOSPADM

## 2023-07-18 RX ORDER — DIPHENHYDRAMINE HYDROCHLORIDE 50 MG/ML
12.5 INJECTION INTRAMUSCULAR; INTRAVENOUS ONCE
Status: DISCONTINUED | OUTPATIENT
Start: 2023-07-18 | End: 2023-07-18

## 2023-07-18 RX ORDER — IBUPROFEN 600 MG/1
600 TABLET ORAL EVERY 6 HOURS
Status: DISCONTINUED | OUTPATIENT
Start: 2023-07-18 | End: 2023-07-19 | Stop reason: HOSPADM

## 2023-07-18 RX ORDER — ACETAMINOPHEN 325 MG/1
650 TABLET ORAL EVERY 4 HOURS PRN
Status: DISCONTINUED | OUTPATIENT
Start: 2023-07-18 | End: 2023-07-19 | Stop reason: HOSPADM

## 2023-07-18 RX ORDER — SENNOSIDES 8.6 MG
1 TABLET ORAL DAILY
Status: DISCONTINUED | OUTPATIENT
Start: 2023-07-18 | End: 2023-07-19 | Stop reason: HOSPADM

## 2023-07-18 RX ORDER — OXYTOCIN/RINGER'S LACTATE 30/500 ML
62.5 PLASTIC BAG, INJECTION (ML) INTRAVENOUS CONTINUOUS
Status: DISCONTINUED | OUTPATIENT
Start: 2023-07-18 | End: 2023-07-19 | Stop reason: HOSPADM

## 2023-07-18 RX ORDER — OXYTOCIN/RINGER'S LACTATE 30/500 ML
1-30 PLASTIC BAG, INJECTION (ML) INTRAVENOUS
Status: DISCONTINUED | OUTPATIENT
Start: 2023-07-18 | End: 2023-07-18

## 2023-07-18 RX ORDER — CALCIUM CARBONATE 500 MG/1
1000 TABLET, CHEWABLE ORAL DAILY PRN
Status: DISCONTINUED | OUTPATIENT
Start: 2023-07-18 | End: 2023-07-19 | Stop reason: HOSPADM

## 2023-07-18 RX ORDER — LIDOCAINE HYDROCHLORIDE AND EPINEPHRINE 15; 5 MG/ML; UG/ML
INJECTION, SOLUTION EPIDURAL AS NEEDED
Status: DISCONTINUED | OUTPATIENT
Start: 2023-07-18 | End: 2023-07-18 | Stop reason: HOSPADM

## 2023-07-18 RX ORDER — TRANEXAMIC ACID 10 MG/ML
1000 INJECTION, SOLUTION INTRAVENOUS ONCE
Status: COMPLETED | OUTPATIENT
Start: 2023-07-18 | End: 2023-07-18

## 2023-07-18 RX ORDER — ONDANSETRON 2 MG/ML
4 INJECTION INTRAMUSCULAR; INTRAVENOUS EVERY 8 HOURS PRN
Status: DISCONTINUED | OUTPATIENT
Start: 2023-07-18 | End: 2023-07-19 | Stop reason: HOSPADM

## 2023-07-18 RX ORDER — DIAPER,BRIEF,INFANT-TODD,DISP
1 EACH MISCELLANEOUS DAILY PRN
Status: DISCONTINUED | OUTPATIENT
Start: 2023-07-18 | End: 2023-07-19 | Stop reason: HOSPADM

## 2023-07-18 RX ORDER — SIMETHICONE 80 MG
80 TABLET,CHEWABLE ORAL 4 TIMES DAILY PRN
Status: DISCONTINUED | OUTPATIENT
Start: 2023-07-18 | End: 2023-07-19 | Stop reason: HOSPADM

## 2023-07-18 RX ADMIN — ROPIVACAINE HYDROCHLORIDE 4 ML: 2 INJECTION EPIDURAL; INFILTRATION; PERINEURAL at 00:25

## 2023-07-18 RX ADMIN — TRANEXAMIC ACID 1000 MG: 10 INJECTION, SOLUTION INTRAVENOUS at 10:48

## 2023-07-18 RX ADMIN — ROPIVACAINE HYDROCHLORIDE 4 ML: 2 INJECTION EPIDURAL; INFILTRATION; PERINEURAL at 00:20

## 2023-07-18 RX ADMIN — SODIUM CHLORIDE, SODIUM LACTATE, POTASSIUM CHLORIDE, AND CALCIUM CHLORIDE 125 ML/HR: .6; .31; .03; .02 INJECTION, SOLUTION INTRAVENOUS at 01:56

## 2023-07-18 RX ADMIN — SODIUM CHLORIDE, SODIUM LACTATE, POTASSIUM CHLORIDE, AND CALCIUM CHLORIDE 125 ML/HR: .6; .31; .03; .02 INJECTION, SOLUTION INTRAVENOUS at 09:46

## 2023-07-18 RX ADMIN — SENNOSIDES 8.6 MG: 8.6 TABLET, FILM COATED ORAL at 12:58

## 2023-07-18 RX ADMIN — ROPIVACAINE HYDROCHLORIDE: 2 INJECTION, SOLUTION EPIDURAL; INFILTRATION at 08:57

## 2023-07-18 RX ADMIN — LIDOCAINE HYDROCHLORIDE AND EPINEPHRINE 3 ML: 15; 5 INJECTION, SOLUTION EPIDURAL at 00:16

## 2023-07-18 RX ADMIN — BENZOCAINE AND LEVOMENTHOL 1 APPLICATION: 200; 5 SPRAY TOPICAL at 14:40

## 2023-07-18 RX ADMIN — ROPIVACAINE HYDROCHLORIDE: 2 INJECTION, SOLUTION EPIDURAL; INFILTRATION at 00:25

## 2023-07-18 RX ADMIN — IBUPROFEN 600 MG: 600 TABLET, FILM COATED ORAL at 12:58

## 2023-07-18 RX ADMIN — OXYTOCIN 62.5 MILLI-UNITS/MIN: 10 INJECTION INTRAVENOUS at 12:58

## 2023-07-18 RX ADMIN — Medication 10 MILLI-UNITS/MIN: at 07:21

## 2023-07-18 RX ADMIN — ROPIVACAINE HYDROCHLORIDE 10 ML/HR: 2 INJECTION EPIDURAL; INFILTRATION; PERINEURAL at 00:28

## 2023-07-18 RX ADMIN — WITCH HAZEL 1 PAD: 500 SOLUTION RECTAL; TOPICAL at 14:40

## 2023-07-18 RX ADMIN — IBUPROFEN 600 MG: 600 TABLET, FILM COATED ORAL at 18:59

## 2023-07-18 NOTE — ANESTHESIA PREPROCEDURE EVALUATION
Procedure:  LABOR ANALGESIA    Relevant Problems   GYN   (+) 39 weeks gestation of pregnancy      Other   (+) Obesity affecting pregnancy, antepartum        Physical Exam    Airway    Mallampati score: II  TM Distance: >3 FB  Neck ROM: full     Dental   No notable dental hx     Cardiovascular  Cardiovascular exam normal    Pulmonary  Pulmonary exam normal     Other Findings        Anesthesia Plan  ASA Score- 2     Anesthesia Type- epidural with ASA Monitors. Additional Monitors:   Airway Plan:           Plan Factors-    Chart reviewed. Existing labs reviewed. Patient is not a current smoker. Patient instructed to abstain from smoking on day of procedure. Patient did not smoke on day of surgery. Obstructive sleep apnea risk education given perioperatively. Induction-     Postoperative Plan-     Informed Consent- Anesthetic plan and risks discussed with patient.

## 2023-07-18 NOTE — DISCHARGE SUMMARY
Discharge Summary - Tricia Vora 24 y.o. female MRN: [id number]    Unit/Bed#: L&D 312-01 Encounter: [id number]    Admission Date: 2023     Discharge Date: 23      Patient Active Problem List   Diagnosis   •  (spontaneous vaginal delivery)   • Obesity affecting pregnancy, antepartum   • Gestational hypertension         OB[facility]N Practice: [facility]    Hospital Course:     Tricia Vora is a 24 y.o.  who was admitted at 39w5d for an elective induction of labor. She was diagnosed with gestational hypertension during her labor course with a PC ratio of 0.21 and a normal CBC/CMP. She was noted to be 1/60/-3 on admission and was induced with Cytotec and Alanis balloon. After Alanis balloon expulsion she was started on Pitocin and amniotomy was performed for clear fluid. An IUPC was placed for closer uterine contraction monitoring as she was in the latent phase of labor for close to 20 hours. She received an epidural for pain control. She received a Pitocin break, and then was started on high-dose Pitocin protocol. She progressed to full cervical dilation and began pushing. She delivered a viable female  on 2023  10:11 AM  via Vaginal, Spontaneous  . The delivery was uncomplicated, and she received TXA for PPH ppx. Baby's Weight: 3175 g (7 lb) ; 111.99     Apgar scores: 8  and 9  at 1 and 5 minutes, respectively     was transferred to  nursery. Patient tolerated the procedure well and was transferred to recovery in stable condition. Her post-partum course was uncomplicated. Her post-partum pain was well controlled with oral analgesics. On day of discharge she was ambulating, voiding spontaneously, tolerating oral intake and hemodynamically stable. Mom's blood type is O positive and  Rhogam was not given. She was discharged home on postpartum day #1 without complications.  Patient was instructed to follow up with her OB as an outpatient and was given appropriate warnings to call doctor or provider if she develops signs of infection or uncontrolled pain. Disposition: Home    Planned Readmission: No    Discharge Medications:   Please see AVS    Discharge instructions :   -Do not place anything (no partner, tampons or douche) in your vagina for 6 weeks. -You may walk for exercise for the first 6 weeks then gradually return to your usual activities.   -Please do not drive for 1 week if you have no stitches and for 2 weeks if you have stitches or underwent a  delivery.    -You may take baths or shower per your preference.   -Please look at your bust (breasts) in the mirror daily and call your doctor for redness or tenderness or increased warmth. - If you have had a  section please look at your incision daily as well and call provider for increasing redness or steady drainage from the incision.   -Please call your doctor's office if temperature > 100.4*F or 38* C, worsening pain or a foul discharge. Follow Up:  - Follow up in 3 weeks for postpartum visit    Justin Alicia.  [address], [address] [place] [address] Gynecology PGY-4  2023  2:00 PM

## 2023-07-18 NOTE — PLAN OF CARE
Problem: ANTEPARTUM  Goal: Maintain pregnancy as long as maternal and/or fetal condition is stable  Description: INTERVENTIONS:  - Maternal surveillance  - Fetal surveillance  - Monitor uterine activity  - Medications as ordered  - Bedrest  Outcome: Progressing     Problem: BIRTH - VAGINAL/ SECTION  Goal: Fetal and maternal status remain reassuring during the birth process  Description: INTERVENTIONS:  - Monitor vital signs  - Monitor fetal heart rate  - Monitor uterine activity  - Monitor labor progression (vaginal delivery)  - DVT prophylaxis  - Antibiotic prophylaxis  Outcome: Progressing  Goal: Emotionally satisfying birthing experience for mother/fetus  Description: Interventions:  - Assess, plan, implement and evaluate the nursing care given to the patient in labor  - Advocate the philosophy that each childbirth experience is a unique experience and support the family's chosen level of involvement and control during the labor process   - Actively participate in both the patient's and family's teaching of the birth process  - Consider cultural, Oriental orthodox and age-specific factors and plan care for the patient in labor  Outcome: Progressing     Problem: Knowledge Deficit  Goal: Verbalizes understanding of labor plan  Description: Assess patient/family/caregiver's baseline knowledge level and ability to understand information. Provide education via patient/family/caregiver's preferred learning method at appropriate level of understanding. 1. Provide teaching at level of understanding. 2. Provide teaching via preferred learning method(s). Outcome: Progressing     Problem: Labor & Delivery  Goal: Manages discomfort  Description: Assess and monitor for signs and symptoms of discomfort. Assess patient's pain level regularly and per hospital policy. Administer medications as ordered.  Support use of nonpharmacological methods to help control pain such as distraction, imagery, relaxation, and application of heat and cold. Collaborate with interdisciplinary team and patient to determine appropriate pain management plan. 1. Include patient in decisions related to comfort. 2. Offer non-pharmacological pain management interventions. 3. Report ineffective pain management to physician. Outcome: Progressing  Goal: Patient vital signs are stable  Description: 1. Assess vital signs - vaginal delivery.   Outcome: Progressing

## 2023-07-18 NOTE — OB LABOR/OXYTOCIN SAFETY PROGRESS
Oxytocin Safety Progress Check Note - Conchita Spann 24 y.o. female MRN: [id number]    Unit/Bed#: L&D 322-01 Encounter: [id number]    Dose (luan-units/min) Oxytocin: 0 luan-units/min (Per Dr. Thierry Ceballos pitocin break)  Contraction Frequency (minutes): 1.5-2  Contraction Quality: Moderate  Tachysystole: No   Cervical Dilation: 5        Cervical Effacement: 80  Fetal Station: -1  Baseline Rate: 130 bpm  Fetal Heart Rate: 120 BPM  FHR Category: Category I               Vital Signs:   Vitals:    07/18/23 0108   BP: 115/58   Pulse: 80   Resp:    Temp:        Notes/comments: Given patient unchanged at 28 U pitocin, will do pitocin break, then restart.         Analy Jones MD 7/18/2023 2:10 AM

## 2023-07-18 NOTE — DISCHARGE INSTRUCTIONS
Self Care After Delivery   AMBULATORY CARE:   The postpartum period is the period of time from delivery to about 6 weeks. During this time you may experience many physical and emotional changes. It is important to understand what is normal and when you need to call your healthcare provider. It is also important to know how to care for yourself during this time. Call your local emergency number (911 in the [address]) for any of the following: You see or hear things that are not there, or have thoughts of harming yourself or your baby. You soak through 1 pad in 15 minutes, have blurry vision, clammy or pale skin, and feel faint. You faint or lose consciousness. You have trouble breathing. You cough up blood. Your  incision comes apart. Seek care immediately if:   Your heart is beating faster than usual.     You have a bad headache or changes in your vision. Your perineal tear, episiotomy site, or  incision is red, swollen, bleeding, or draining pus. You have severe abdominal pain. Call your doctor or obstetrician if:   Your leg is painful, red, and larger than usual.     You soak through 1 or more pads in an hour, or pass blood clots larger than a quarter from your vagina. You have a fever. You have new or worsening pain in your abdomen or vagina. You continue to have depression 1 to 2 weeks after you deliver. You have trouble sleeping. You have foul-smelling discharge from your vagina. You have pain or burning when you urinate. You do not have a bowel movement for 3 days or more. You have nausea or are vomiting. You have hard lumps or red streaks over your breasts. You have cracked nipples or bleed from your nipples. You have questions or concerns about your condition or care. Physical changes:  The following are normal changes after you give birth:  Pain in the area between your anus and vagina     Breast pain Constipation or hemorrhoids     Hot or cold flashes     Vaginal bleeding or discharge     Mild to moderate abdominal cramping     Difficulty controlling bowel movements or urine     Emotional changes: A drop in hormone levels after you deliver may cause changes in your emotions. You may feel irritable, sad, or anxious. You may cry easily or for no reason. You may also feel depressed. Depression that continues can be a sign of postpartum depression, a condition that can be treated. Treatment may include talk therapy, medicines, or both. Healthcare providers will ask how you are feeling and if you have any depression. These talks can happen during appointments for your medical care and for your baby's care, such as well child visits. Providers can help you find ways to care for yourself and your baby. Talk to your providers about the following:  When emotional changes or depression started, and if it is getting worse over time     Problems you are having with daily activities, sleep, or caring for your baby     If anything makes you feel worse, or makes you feel better     Feeling that you are not bonding with your baby the way you want     Any problems your baby has with sleeping or feeding     Your baby is fussy or cries a lot     Support you have from friends, family, or others     Breast care for breastfeeding mothers: You may have sore breasts for 3 to 6 days after you give birth. This happens as your milk begins to fill your breasts. You may also have sore breasts if you do not breastfeed frequently. Do the following to care for your breasts:  Apply a moist, warm, compress to your breast as directed. This may help soothe your breasts. Make sure the washcloth is not too hot before you apply it to your breast.     Nurse your baby or pump your milk frequently. This may prevent clogged milk ducts. Ask your healthcare provider how often to nurse or pump. Massage your breasts as directed.  This may help increase your milk flow. Gently rub your breasts in a circular motion before you breastfeed. You may need to gently squeeze your breast or nipple to help release milk. You can also use a breast pump to help release milk from your breast.     Wash your breasts with warm water only. Do not put soap on your nipples. Soap may cause your nipples to become dry. Apply lanolin cream to your nipples as directed. Lanolin cream may add moisture to your skin and prevent nipple dryness. Always wash off lanolin cream with warm water before you breastfeed. Place pads in your bra. Your nipples may leak milk when you are not breastfeeding. You can place pads inside of your bra to help prevent leaking onto your clothing. Ask your healthcare provider where to purchase bra pads. Get breastfeeding support if needed. Healthcare providers can answer questions about breastfeeding and provide you with support. Ask your healthcare provider who you can contact if you need breastfeeding support. Breast care for non-breastfeeding mothers: Milk will fill your breasts even if you bottle feed your baby. Do the following to help stop your milk from filling your breasts and causing pain:  Wear a bra with support at all times. A sports bra or a tight-fitting bra will help stop your milk from coming in. Apply ice on each breast for 15 to 20 minutes every hour or as directed. Use an ice pack, or put crushed ice in a plastic bag. Cover it with a towel before you apply it to your breast. Ice helps your milk ducts shrink. Keep your breasts away from warm water. Warm water will make it easier for milk to fill your breasts. Stand with your breasts away from warm water in the shower. Limit how much you touch your breasts. This will prevent them from filling with milk. Perineum care: Your perineum is the area between your rectum and vagina. It is normal to have swelling and pain in this area after you give birth.  If you had an episiotomy, your healthcare provider may give you special instructions. Clean your perineum after you use the bathroom. This may prevent infection and help with healing. Use a spray bottle with warm water to clean your perineum. You may also gently spray warm water against your perineum when you urinate. Always wipe front to back. Take a sitz bath as directed. A sitz bath may help relieve swelling and pain. Fill your bath tub or bucket with water up to your hips and sit in the water. Use cold water for 2 days after you deliver. Then use warm water. Ask your healthcare provider for more information about a sitz bath. Apply ice packs for the first 24 hours or as directed. Use a plastic glove filled with ice or buy an ice pack. Wrap the ice pack or plastic glove in a small towel or wash cloth. Place the ice pack on your perineum for 20 minutes at a time. Sit on a donut-shaped pillow. This may relieve pressure on your perineum when you sit. Use wipes that contain medicine or take pills as directed. Your healthcare provider may tell you to use witch hazel pads. You can place witch hazel pads in the refrigerator before you apply them to your perineum. Your provider may also tell you to take NSAIDs. Ask him or her how often to take pills or use the wipes. Do not go swimming or take tub baths for 4 to 6 weeks or as directed. This will help prevent an infection in your vagina or uterus. Bowel and bladder care: It may take 3 to 5 days to have a bowel movement after you deliver your baby. You can do the following to prevent or manage constipation, and get control of your bowel or bladder:  Take stool softeners as directed. A stool softener is medicine that will make your bowel movements softer. This may prevent or relieve constipation. A stool softener may also make bowel movements less painful. Drink plenty of liquids. Ask how much liquid to drink each day and which liquids are best for you. Liquids may help prevent constipation. Eat foods high in fiber. Examples include fruits, vegetables, grains, beans, and lentils. Ask your healthcare provider how much fiber you need each day. Fiber may prevent constipation. Do Kegel exercises as directed. Kegel exercises will help strengthen the muscles that control bowel movements and urination. Ask your healthcare provider for more information on Kegel exercises. Apply cold compresses or medicine to hemorrhoids as directed. This may relieve swelling and pain. Your healthcare provider may tell you to apply ice or wipes that contain medicine to your hemorrhoids. He or she may also tell you to use a sitz bath. Ask your provider for more information on how to manage hemorrhoids. Nutrition: Good nutrition is important in the postpartum period. It will help you return to a healthy weight, increase your energy levels, and prevent constipation. It will also help you get enough nutrients and calories if you are going to breastfeed your baby. Eat a variety of healthy foods. Healthy foods include fruits, vegetables, whole-grain breads, low-fat dairy products, beans, lean meats, and fish. You may need 500 to 700 extra calories each day if you breastfeed your baby. You may also need extra protein. Limit foods with added sugar and high amounts of fat. These foods are high in calories and low in healthy nutrients. Read food labels so you know how much sugar and fat is in the food you want to eat. Drink 8 to 10 glasses of water per day. Water will help you make plenty of milk for your baby. It will also help prevent constipation. Drink a glass of water every time you breastfeed your baby. Take vitamins as directed. Ask your healthcare provider what vitamins you need. Limit caffeine and alcohol if you are breastfeeding. Caffeine and alcohol can get into your breast milk. Caffeine and alcohol can make your baby fussy.  They can also interfere with your baby's sleep. Ask your healthcare provider if you can drink alcohol or caffeine. Rest and sleep: You may feel very tired in the postpartum period. Enough sleep will help you heal and give you energy to care for your baby. The following may help you get sleep and rest:  Nap when your baby naps. Your baby may nap several times during the day. Get rest during this time. Limit visitors. Many people may want to see you and your baby. Ask friends or family to visit on different days. This will give you time to rest.     Do not plan too much for one day. Put off household chores so that you have time to rest. Gradually do more each day. Ask for help from family, friends, or neighbors. Ask them to help you with laundry, cleaning, or errands. Also ask someone to watch the baby while you take a nap or relax. Ask your partner to help with the care of your baby. Pump some of your breast milk so your partner can feed your baby during the night. Exercise after delivery: Wait until your healthcare provider says it is okay to exercise. Exercise can help you lose weight, increase your energy levels, and manage your mood. It can also prevent constipation and blood clots. Start with gentle exercises such as walking. Do more as you have more energy. You may need to avoid abdominal exercises for 1 to 2 weeks after you deliver. Talk to your healthcare provider about an exercise plan that is right for you. Sexual activity after delivery:   Do not have sex until your healthcare provider says it is okay. You may need to wait 4 to 6 weeks before you have sex. This may prevent infection and allow time to heal.     Your menstrual cycle may begin as soon as 3 weeks after you deliver. Your period may be delayed if you breastfeed your baby. You can become pregnant before you get your first postpartum period. Talk to your healthcare provider about birth control that is right for you.  Some types of birth control are not safe during breastfeeding. For support and more information: Join a support group for new mothers. Ask for help from family and friends with chores, errands, and care of your baby. Office of Carolyn Tol[place],  Department of Health and Human Services  [address], [address] [facility], [facility] , [address] [facility]  [address], [address] [facility], [facility] , [address]  Phone: [phone]  Web Address: www.womenshealth.gov  [facility] Postpartum [address] [facility] , [address]  [address] , [address]  Web Address: [employer]. org/pregnancy/postpartum-care. aspx  Follow up with your doctor or obstetrician as directed: You will need to follow up within 2 to 6 weeks of delivery. Write down your questions so you remember to ask them at your visits. © Copyright [facility] Information is for End User's use only and may not be sold, redistributed or otherwise used for commercial purposes. All illustrations and images included in CareNotes® are the copyrighted property of [employer]D.A[employer] Inc. or [address] [place] [address]  The above information is an  only. It is not intended as medical advice for individual conditions or treatments. Talk to your doctor, nurse or pharmacist before following any medical regimen to see if it is safe and effective for you.

## 2023-07-18 NOTE — OB LABOR/OXYTOCIN SAFETY PROGRESS
Oxytocin Safety Progress Check Note - Makayla Blood 24 y.o. female MRN: [id number]    Unit/Bed#: L&D 322-01 Encounter: [id number]    Dose (luan-units/min) Oxytocin: 14 luan-units/min  Contraction Frequency (minutes): 4-5  Contraction Quality: Moderate  Tachysystole: No   Cervical Dilation: 5-6        Cervical Effacement: 80  Fetal Station: -1  Baseline Rate: 120 bpm  Fetal Heart Rate: 120 BPM  FHR Category: Category I               Vital Signs:   Vitals:    23 0821   BP:    Pulse:    Resp:    Temp: 98 °F (36.7 °C)       Notes/comments:   SAFETY HUDDLE:  TRIGGER: Patient meets criteria for Failed Induction of Labor    PARTICIPANTS: Pat JULES, Jennifer GOVEA    REVIEW OF CURRENT PLAN OF CARE AND MANAGEMENT:   Apolinar Aponte is in the latent phase of labor. The cervical exam has remained unchanged and membranes have been ruptured for 23 hrs and Oxytocin has been running for approximately more than 18 hours. During this time, Oxytocin was discontinued for a cumulative duration of approximately 2 hrs due to dysfunctional ctx pattern for a pit break. Given the fetal status is category 1 and has remained category 1 throughout entire labor course, we will try high-dose Pitocin protocol of increasing by 4 milliunits every 15 minutes. Also repositioning frequently on the peanut ball, she is comfortable with her epidural which she received at midnight. If she makes no further cervical change at time of her next check at 1030, we will call primary low-transverse  delivery for indication of failed induction of labor.     D/w Dr. Yonas Godoy DO 2023 8:28 AM

## 2023-07-18 NOTE — OB LABOR/OXYTOCIN SAFETY PROGRESS
Oxytocin Safety Progress Check Note - [facility] 24 y.o. female MRN: [id number]    Unit/Bed#: L&D 322-01 Encounter: [id number]    Dose (luan-units/min) Oxytocin: 8 luan-units/min  Contraction Frequency (minutes): irritability  Contraction Quality: Mild  Tachysystole: No   Cervical Dilation: 5        Cervical Effacement: 80  Fetal Station: -1  Baseline Rate: 135 bpm  Fetal Heart Rate: 120 BPM  FHR Category: Category I               Vital Signs:   Vitals:    07/18/23 0622   BP: 107/59   Pulse: 98   Resp: 18   Temp: 98.9 °F (37.2 °C)       Notes/comments: Continue titrating pitocin.         Damian Pace MD 7/18/2023 6:36 AM

## 2023-07-18 NOTE — OB LABOR/OXYTOCIN SAFETY PROGRESS
Oxytocin Safety Progress Check Note - Theta Johanne 24 y.o. female MRN: [id number]    Unit/Bed#: L&D 322-01 Encounter: [id number]    Dose (luan-units/min) Oxytocin: 22 luan-units/min  Contraction Frequency (minutes): 2  Contraction Quality: Strong  Tachysystole: No   Cervical Dilation: 6        Cervical Effacement: 90  Fetal Station: 0  Baseline Rate: 120 bpm  Fetal Heart Rate: 120 BPM  FHR Category: Category I  Oxytocin Safety Progress Check: Safety check completed            Vital Signs:     Vitals:    07/18/23 0954   BP: 115/58   Pulse: 92   Resp:    Temp:        Notes/comments:     [facility]. She is starting to feel much more pressure and nauseous. Cervical exam as above, category 1 tracing doing well on high-dose Pitocin protocol. Pitocin currently at 22 and will leave it at that rate as she is tena strongly every 2 minutes. Cervix noted to be slightly swollen, will try dose of IV Benadryl. Repositioned onto left side with peanut ball. Dr. Nehal Patino notified.         Pramod Dos Santos DO 7/18/2023 9:58 AM

## 2023-07-18 NOTE — ANESTHESIA PROCEDURE NOTES
Epidural Block    Patient location during procedure: OB  Start time: 7/18/2023 12:15 AM  Reason for block: procedure for pain and at surgeon's request  Staffing  Performed by: Juan A Canas DO  Authorized by: Juan A Canas DO    Preanesthetic Checklist  Completed: patient identified, IV checked, site marked, risks and benefits discussed, surgical consent, monitors and equipment checked, pre-op evaluation and timeout performed  Epidural  Patient position: sitting  Prep: Betadine  Patient monitoring: frequent blood pressure checks  Approach: midline  Location: lumbar  Injection technique: NISHANT air  Needle  Needle type: Tuohy   Needle gauge: 18 G  Catheter type: side hole  Catheter size: 20 G  Catheter at skin depth: 12 cm  Catheter securement method: clear occlusive dressing  Test dose: negative  Assessment  Number of attempts: 2  Events: cerebrospinal fluidnegative aspiration for CSF, negative aspiration for heme and no paresthesia on injection  patient tolerated the procedure well with no immediate complications  Additional Notes  1st attempt- csf from needle.   Needle removed  2nd attempt- no complications

## 2023-07-18 NOTE — L&D DELIVERY NOTE
Vaginal Delivery Summary - OB/GYN   Amy Dennis 24 y.o. female MRN: [id number]  Unit/Bed#: L&D 322-01 Encounter: [id number]    Pre-delivery Diagnosis:   Pregnancy at 39w5d  Gestational Hypertension diagnosed intrapartum  Elective induction of labor  Obesity in pregnancy    Post-delivery Diagnosis: same, delivered    Procedure: Spontaneous Vaginal Delivery    Attending Physician: Dr. Kaylan Malhotra  Resident Physician: Dr. Belén Oakes, Dr. Marbella Zavala    Anesthesia: Epidural    QBL: [id number]    Complications: none apparent    Specimens:   1. Arterial and venous cord gases  2. Cord blood  3. Segment of umbilical cord  4. Placenta to family     Findings:  1. Viable female  on 2023  10:11 AM  via Vaginal, Spontaneous  . with apgar scores of 8  and 9  at 1 and 5 minutes, respectively. Weight pending at time of dictation for skin to skin bonding. 2. Spontaneous delivery of intact placenta  3. Intact perineum, bilateral labial lacerations repaired with 3-0 Vicryl Rapide    Gases:  Umbilical Artery  Recent Labs     23  1015   PHCART 7.329   BECART -0.6*       Umbilical Vein  Recent Labs     23  1015   PHCVEN 7.389   BECVEN -3.4*       Brief history and labor course:  Amy Dennis is a 24 y.o.  who was admitted at 39w5d for an elective induction of labor. She was diagnosed with gestational hypertension during her labor course with a PC ratio of 0.21 and a normal CBC/CMP. She was noted to be 1/60/-3 on admission and was induced with Cytotec and Alanis balloon. After Alanis balloon expulsion she was started on Pitocin and amniotomy was performed for clear fluid. An IUPC was placed for closer uterine contraction monitoring as she was in the latent phase of labor for close to 20 hours. She received an epidural for pain control. She received a Pitocin break, and then was started on high-dose Pitocin protocol.   She progressed to full cervical dilation and began pushing. Description of delivery:    After two pushes, patient delivered a viable female , wt pending as mother is doing skin to skin bonding. The fetal vertex delivered KAI position spontaneously. There was one loop loose nuchal cord which the  was delivered through. The anterior (right) shoulder delivered atraumatically with maternal expulsive forces and the assistance of gentle downward traction. The posterior (left) shoulder delivered with maternal expulsive forces and the assistance of gentle upward traction. The remainder of the fetus delivered spontaneously. Upon delivery, the infant was placed on the mothers abdomen and the cord was doubly clamped and cut. Delayed cord clamping was achieved. The infant was noted to cry spontaneously and was moving all extremities appropriately. There was no evidence for injury. Awaiting nurse resuscitators evaluated the . Arterial and venous cord blood gases and cord blood was collected for analysis. These were promptly sent to the lab. In the immediate post-partum, active management of the 3rd stage of labor was performed with massage, the administration of 30 units of IV pitocin, and gentle traction on the umbilical cord. The placenta delivered spontaneously and was noted to have a centrally inserted 3 vessel cord. The placenta was sent with family. Upon bimanual exam of the uterus, mild uterine atony was noted without hemorrhage. Bimanual massage was performed and the uterine clots were evacuated the uterus was noted to clamp down nicely. Due to her long labor course, 1g of TXA was given prophylactically in the immediate postpartum period. Laceration Repair  The vagina, cervix, perineum, and rectum were inspected and there was noted to be bilateral labial lacerations with no perineal tears. The patient was comfortable with epidural for analgesia.  The vaginal mucosa were then re approximated using 3-0 vicryl rapide with one figure of 8 stitch on the left labia and three simple interrupted stitches on the right. At the conclusion of the procedure, all needle, sponge, and instrument counts were noted to be correct. Dr. Cliff Shi was present and participated in all key portions of the case.     Disposition:  The patient and the  both tolerated the procedure well and are recovering in labor and delivery room     Fabio Rogers MD  PGY 1, Obstetrics and Gynecology  2023  10:45 AM

## 2023-07-18 NOTE — PLAN OF CARE
Problem: ANTEPARTUM  Goal: Maintain pregnancy as long as maternal and/or fetal condition is stable  Description: INTERVENTIONS:  - Maternal surveillance  - Fetal surveillance  - Monitor uterine activity  - Medications as ordered  - Bedrest  Outcome: Completed     Problem: BIRTH - VAGINAL/ SECTION  Goal: Fetal and maternal status remain reassuring during the birth process  Description: INTERVENTIONS:  - Monitor vital signs  - Monitor fetal heart rate  - Monitor uterine activity  - Monitor labor progression (vaginal delivery)  - DVT prophylaxis  - Antibiotic prophylaxis  Outcome: Completed  Goal: Emotionally satisfying birthing experience for mother/fetus  Description: Interventions:  - Assess, plan, implement and evaluate the nursing care given to the patient in labor  - Advocate the philosophy that each childbirth experience is a unique experience and support the family's chosen level of involvement and control during the labor process   - Actively participate in both the patient's and family's teaching of the birth process  - Consider cultural, Muslim and age-specific factors and plan care for the patient in labor  Outcome: Completed     Problem: Knowledge Deficit  Goal: Verbalizes understanding of labor plan  Description: Assess patient/family/caregiver's baseline knowledge level and ability to understand information. Provide education via patient/family/caregiver's preferred learning method at appropriate level of understanding. 1. Provide teaching at level of understanding. 2. Provide teaching via preferred learning method(s). Outcome: Completed     Problem: Labor & Delivery  Goal: Manages discomfort  Description: Assess and monitor for signs and symptoms of discomfort. Assess patient's pain level regularly and per hospital policy. Administer medications as ordered.  Support use of nonpharmacological methods to help control pain such as distraction, imagery, relaxation, and application of heat and cold. Collaborate with interdisciplinary team and patient to determine appropriate pain management plan. 1. Include patient in decisions related to comfort. 2. Offer non-pharmacological pain management interventions. 3. Report ineffective pain management to physician. Outcome: Completed  Goal: Patient vital signs are stable  Description: 1. Assess vital signs - vaginal delivery.   Outcome: Completed

## 2023-07-18 NOTE — ANESTHESIA POSTPROCEDURE EVALUATION
Post-Op Assessment Note    CV Status:  Stable  Pain Score: 0    Pain management: adequate     Mental Status:  Alert and awake   Hydration Status:  Euvolemic and stable   PONV Controlled:  Controlled   Airway Patency:  Patent      Post Op Vitals Reviewed: Yes      Staff: Anesthesiologist     Post-op block assessment: site cleaned, catheter intact and no complications      No notable events documented.     BP      Temp      Pulse     Resp      SpO2      /79   Pulse 97   Temp 97.7 °F (36.5 °C)   Resp 18   Ht 5' 9" (1.753 m)   Wt 115 kg (253 lb)   LMP 10/13/2022 (Exact Date)   Breastfeeding Unknown   BMI 37.36 kg/m² HENT: whitish film on mucous membrane and buccal mucosa

## 2023-07-18 NOTE — OB LABOR/OXYTOCIN SAFETY PROGRESS
Oxytocin Safety Progress Check Note - Tricia Vora 24 y.o. female MRN: [id number]    Unit/Bed#: L&D 322-01 Encounter: [id number]    Dose (luan-units/min) Oxytocin: 26 luan-units/min  Contraction Frequency (minutes): 1.5-2  Contraction Quality: Moderate  Tachysystole: No   Cervical Dilation: 5        Cervical Effacement: 80  Fetal Station: -1  Baseline Rate: 115 bpm  Fetal Heart Rate: 120 BPM  FHR Category: Category I               Vital Signs:   Vitals:    07/17/23 2251   BP: 147/87   Pulse: 94   Resp:    Temp:        Notes/comments: Pt requesting epidural given she is unchanged. Consider pitocin break if unchanged.         Ravin Ruvalcaba MD 7/17/2023 11:20 PM

## 2023-07-18 NOTE — OB LABOR/OXYTOCIN SAFETY PROGRESS
Oxytocin Safety Progress Check Note - Mercedez Reyes 24 y.o. female MRN: [id number]    Unit/Bed#: L&D 322-01 Encounter: [id number]    Dose (luan-units/min) Oxytocin: 22 luan-units/min  Contraction Frequency (minutes): 2  Contraction Quality: Strong  Tachysystole: No   Cervical Dilation: 10  Dilation Complete Date: 07/18/23  Dilation Complete Time: 1005  Cervical Effacement: 100  Fetal Station: 2  Baseline Rate: 120 bpm  Fetal Heart Rate: 120 BPM  FHR Category: Category I  Oxytocin Safety Progress Check: Safety check completed            Vital Signs:   Vitals:    07/18/23 0954   BP: 115/58   Pulse: 92   Resp:    Temp:        Notes/comments:     Has achieved full cervical dilation, will plan to begin pushing.         Walter Lawton DO 7/18/2023 10:05 AM

## 2023-07-18 NOTE — LACTATION NOTE
This note was copied from a baby's chart. CONSULT - LACTATION  Baby Girl Carolynn Jessica 0 days female MRN: [id number]    [address] [place] [address] NURSERY Room / Bed: L&D 322(N)/L&D 322(N) Encounter: [id number]    Maternal Information     MOTHER:  Edwige Jessica  Maternal Age: 24 y.o.   OB History: # 1 - Date: 23, Sex: Female, Weight: 3175 g (7 lb), GA: 39w5d, Delivery: Vaginal, Spontaneous, Apgar1: 8, Apgar5: 9, Living: Living, Birth Comments: None   Previouse breast reduction surgery? No    Lactation history:   Has patient previously breast fed: No   How long had patient previously breast fed:     Previous breast feeding complications:       Past Surgical History:   Procedure Laterality Date   • EYE SURGERY          Birth information:  YOB: 2024   Time of birth: 10:11 AM   Sex: female   Delivery type: Vaginal, Spontaneous   Birth Weight: 3175 g (7 lb)   Percent of Weight Change: 0%     Gestational Age: 38w8d   [unfilled]    Assessment     Breast and nipple assessment: Denies need for assistance at this time    [place] Assessment: sleepy    Feeding assessment: feeding well after delivery    LATCH:  Latch: Grasps breast, tongue down, lips flanged, rhythmic sucking   Audible Swallowing: A few with stimulation   Type of Nipple: Everted (After stimulation)   Comfort (Breast/Nipple): Soft/non-tender   Hold (Positioning): Partial assist, teach one side, mother does other, staff holds   LATCH Score: 8          Feeding recommendations:  breast feed on demand     Met with mother. Provided  with Ready, Set, Baby booklet which contained information on:  Hand expression with access to QR codes to review hand expression. Positioning and latch reviewed as well as showing images of other feeding positions. Discussed the properties of a good latch in any position.    Feeding on cue and what that means for recognizing infant's hunger, s/s that baby is getting enough milk and some s/s that breastfeeding dyad may need further help Dad at bedside. Skin to Skin contact an benefits to mom and baby  Avoidance of pacifiers for the first month discussed. Gave information on common concerns, what to expect the first few weeks after delivery, preparing for other caregivers, and how partners can help. Resources for support also provided. Also reviewed  discharge breastfeeding booklet including the feeding log. Emphasized 8 or more (12) feedings in a 24 hour period, what to expect for the number of diapers per day of life and the progression of properties of the  stooling pattern. Reviewed breastfeeding and your lifestyle, storage and preparation of breast milk, how to keep you breast pump clean, the employed breastfeeding mother and paced bottle feeding handouts. Booklet included Breastfeeding Resources for after discharge including access to the number for the [facility]. [facility]  to call for assistance, questions and concerns. Extension number for inpatient lactation support provided.           Flash Malhotra RN 2023 2:39 PM

## 2023-07-18 NOTE — OB LABOR/OXYTOCIN SAFETY PROGRESS
Oxytocin Safety Progress Check Note - Ariadne Anderson 24 y.o. female MRN: [id number]    Unit/Bed#: L&D 322-01 Encounter: [id number]    Dose (luan-units/min) Oxytocin: 10 luan-units/min  Contraction Frequency (minutes): 5-8  Contraction Quality: Moderate  Tachysystole: No   Cervical Dilation: 5-6        Cervical Effacement: 80  Fetal Station: -1  Baseline Rate: 135 bpm  Fetal Heart Rate: 120 BPM  FHR Category: Category I               Vital Signs:   Vitals:    07/18/23 0721   BP: 120/57   Pulse: 92   Resp:    Temp: 98.7 °F (37.1 °C)       Notes/comments: Patient resting comfortably on exam, SVE as above. FHR category I. [place] every 5 minutes. Patient was discussed at board signout this morning. She has been 5 cm since 1408 on 7/17, and has been ruptured since 0959 on 7/17. She received her epidural at 2330 and had a break from pitocin overnight into morning of 7/18. Pitocin turned back on at 0430 on 7/18. We are planning to trial maternal position changes, use of peanut ball, and continue to uptitrate pitocin for two more hours. If not making cervical change at 1045 will move toward cesearean delivery for failed induction.     Andria López MD 7/18/2023 7:36 AM

## 2023-07-18 NOTE — NURSING NOTE
Discharge teaching done. Save your life magnet and all handouts given. Questions encouraged and answered.

## 2023-07-19 VITALS
HEIGHT: 69 IN | DIASTOLIC BLOOD PRESSURE: 83 MMHG | TEMPERATURE: 97.9 F | WEIGHT: 253 LBS | HEART RATE: 95 BPM | BODY MASS INDEX: 37.47 KG/M2 | RESPIRATION RATE: 16 BRPM | SYSTOLIC BLOOD PRESSURE: 130 MMHG | OXYGEN SATURATION: 98 %

## 2023-07-19 LAB
DME PARACHUTE DELIVERY DATE ACTUAL: NORMAL
DME PARACHUTE DELIVERY DATE REQUESTED: NORMAL
DME PARACHUTE ITEM DESCRIPTION: NORMAL
DME PARACHUTE ORDER STATUS: NORMAL
DME PARACHUTE SUPPLIER NAME: NORMAL
DME PARACHUTE SUPPLIER PHONE: NORMAL

## 2023-07-19 PROCEDURE — 99024 POSTOP FOLLOW-UP VISIT: CPT | Performed by: OBSTETRICS & GYNECOLOGY

## 2023-07-19 RX ADMIN — IBUPROFEN 600 MG: 600 TABLET, FILM COATED ORAL at 08:27

## 2023-07-19 RX ADMIN — IBUPROFEN 600 MG: 600 TABLET, FILM COATED ORAL at 02:08

## 2023-07-19 RX ADMIN — SENNOSIDES 8.6 MG: 8.6 TABLET, FILM COATED ORAL at 08:27

## 2023-07-19 NOTE — PROGRESS NOTES
Progress Note - OB/GYN   Jerica Barnett 24 y.o. female MRN: [id number]  Unit/Bed#: L&D 312-01 Encounter: [id number]      Assessment/Plan    Jerica Barnett is a 24 y.o. [place] who is PPD 1 s/p  at 39w5d     Gestational hypertension  Assessment & Plan  -Labs wnl  Systolic (20VFL), RWZ:001 , Min:99 , BBP:891   Diastolic (80RYC), EYY:39, Min:50, Max:95      Obesity affecting pregnancy, antepartum  Assessment & Plan  BMI 37    *  (spontaneous vaginal delivery)  Assessment & Plan  • Routine postpartum care  • Encourage ambulation  • Encourage familial bonding  • Lactation support as needed  • Pain: Motrin/Tylenol around the clock, oxycodone if needed         Disposition: Anticipate discharge home postpartum Day #2  Barriers to discharge: ongoing couplet care, blood pressures      Subjective/Objective     Subjective: Postpartum state    Pain: no  Tolerating PO: yes  Voiding: yes  Flatus: yes  BM: no  Ambulating: yes  Breastfeeding: Breastfeeding  Chest pain: no  Shortness of breath: no  Leg pain: no  Lochia: decreasing    Objective:     Vitals:  Vitals:    23 1730 23 1930 23 2300 23 0300   BP: 132/82 126/71 120/70 123/65   BP Location: Right arm Right arm Right arm Right arm   Pulse: 94 98 84 101   Resp:    Temp:  (!) 97.4 °F (36.3 °C) 97.5 °F (36.4 °C) 97.5 °F (36.4 °C)   TempSrc:  Temporal Temporal Temporal   SpO2: 98%      Weight:       Height:           Physical Exam:   GEN: appears well, alert and oriented x 3, pleasant and cooperative   CV: Regular rate  RESP: non labored breathing  ABDOMEN: soft, no tenderness, no distention, Uterine fundus firm and non-tender, -1 cm below the umbilicus  EXTREMITIES: non-tender  NEURO Alert and oriented to person, place, and time.        Lab Results   Component Value Date    WBC 10.26 (H) 2023    HGB 10.8 (L) 2023    HCT 33.7 (L) 2023    MCV 89 2023     2023         Haroldo Sorensen MD  Obstetrics & Gynecology  07/19/23

## 2023-07-19 NOTE — PLAN OF CARE
Problem: POSTPARTUM  Goal: Experiences normal postpartum course  Description: INTERVENTIONS:  - Monitor maternal vital signs  - Assess uterine involution and lochia  Outcome: Adequate for Discharge  Goal: Appropriate maternal -  bonding  Description: INTERVENTIONS:  - Identify family support  - Assess for appropriate maternal/infant bonding   -Encourage maternal/infant bonding opportunities  - Referral to  or  as needed  Outcome: Adequate for Discharge  Goal: Establishment of infant feeding pattern  Description: INTERVENTIONS:  - Assess breast/bottle feeding  - Refer to lactation as needed  Outcome: Adequate for Discharge  Goal: Incision(s), wounds(s) or drain site(s) healing without S/S of infection  Description: INTERVENTIONS  - Assess and document dressing, incision, wound bed, drain sites and surrounding tissue  - Provide patient and family education  - Perform skin care/dressing changes every  Outcome: Adequate for Discharge

## 2023-07-19 NOTE — PLAN OF CARE
Problem: POSTPARTUM  Goal: Experiences normal postpartum course  Description: INTERVENTIONS:  - Monitor maternal vital signs  - Assess uterine involution and lochia  Outcome: Progressing  Goal: Appropriate maternal -  bonding  Description: INTERVENTIONS:  - Identify family support  - Assess for appropriate maternal/infant bonding   -Encourage maternal/infant bonding opportunities  - Referral to  or  as needed  Outcome: Progressing  Goal: Establishment of infant feeding pattern  Description: INTERVENTIONS:  - Assess breast/bottle feeding  - Refer to lactation as needed  Outcome: Progressing  Goal: Incision(s), wounds(s) or drain site(s) healing without S/S of infection  Description: INTERVENTIONS  - Assess and document dressing, incision, wound bed, drain sites and surrounding tissue  - Provide patient and family education  -Outcome: Progressing

## 2023-07-19 NOTE — PROGRESS NOTES
Discharge teaching performed with patient and FOB. Educational packet provided, as well as the save your life magnet. Patient verbalized an understanding and was encouraged to continue to ask questions prior to discharge.

## 2023-07-19 NOTE — LACTATION NOTE
This note was copied from a baby's chart. Met with mother to go over discharge breastfeeding booklet including the feeding log. Emphasized 8 or more (12) feedings in a 24 hour period, what to expect for the number of diapers per day of life and the progression of properties of the  stooling pattern. Reviewed breastfeeding and your lifestyle, storage and preparation of breast milk, how to keep you breast pump clean, the employed breastfeeding mother and paced bottle feeding handouts. Booklet included Breastfeeding Resources for after discharge including access to the number for the [employer]. Samara Diehl states that Spring prefers the left breast, we discussed continuing to work on latch and positioning and I encouraged her to call Baby and Me if needed after D/C. I encouraged parents to call with any questions or concerns.

## 2023-07-19 NOTE — CASE MANAGEMENT
Case Management Progress Note    Patient name Natalie Krishnamurthy  Location L&D 312/L&D 758-88 MRN [id number]  : 2000 Date 2023       LOS (days): 3  Geometric Mean LOS (GMLOS) (days):   Days to GMLOS:        OBJECTIVE:        Current admission status: Inpatient  Preferred Pharmacy:   [address] [place] [address], [facility] [address]  Phone: [phone] Fax: [phone]    Primary Care Provider: No primary care provider on file. Primary Insurance: CAPITAL  Secondary Insurance:     PROGRESS NOTE:    CM received consult for breast pump. MOB requesting Spectra S9 pump. CM placed order via [employer]. Order approved. Pump will be sent out for delivery to home today. CM left MOB a VM explaining same.

## 2023-07-31 LAB — PLACENTA IN STORAGE: NORMAL

## 2024-03-18 ENCOUNTER — NURSE TRIAGE (OUTPATIENT)
Age: 24
End: 2024-03-18

## 2024-03-18 DIAGNOSIS — Z32.01 POSITIVE PREGNANCY TEST: Primary | ICD-10-CM

## 2024-03-18 NOTE — TELEPHONE ENCOUNTER
"Patient called, she delivered a baby 8 months ago. She was breastfeeding and stopped in November. She has not had a period since then. Last week she was having nausea. She took a pregnancy test over the weekend and it was positive. She is unsure how far along she is. She has been having unprotected intercourse since November. Can we order HCG levels for her to confirm when her dating and viability appt should be? If so please order them for her    Reason for Disposition   Pregnant    Answer Assessment - Initial Assessment Questions  1. LMP:  \"When did your last menstrual period begin?\"      Has not had a period since after delivered baby 8 months ago  2. DAYS LATE: \"How many days late is your menstrual period?\"      No period since the fall  3. REGULARITY: \"How regular are your periods?\"     Have been irregular   4. PREGNANCY: \"Is there any chance you are pregnant?\" (e.g., unprotected intercourse, missed birth control pill, broken condom) \"Have you used a home pregnancy test?\"      Positive pregnancy test at home   5. BREASTFEEDING: \"Are you breastfeeding?\"      No  6. BIRTH CONTROL PILLS: \"Are you taking birth control pills, or have you stopped recently?\"      No  7. DEPOPROVERA: \"Has your doctor given you a shot to prevent pregnancy?\" (e.g., Depoprovera injection)      No    Protocols used: Menstrual Period - Missed or Late-ADULT-[place]    "

## 2024-03-19 ENCOUNTER — LAB (OUTPATIENT)
Dept: LAB | Facility: HOSPITAL | Age: 24
End: 2024-03-19
Payer: COMMERCIAL

## 2024-03-19 DIAGNOSIS — Z32.01 POSITIVE PREGNANCY TEST: ICD-10-CM

## 2024-03-19 LAB
B-HCG SERPL-ACNC: 8334 MIU/ML (ref 0–5)
PROGEST SERPL-MCNC: 20.18 NG/ML

## 2024-03-19 PROCEDURE — 36415 COLL VENOUS BLD VENIPUNCTURE: CPT

## 2024-03-19 PROCEDURE — 84702 CHORIONIC GONADOTROPIN TEST: CPT

## 2024-03-19 PROCEDURE — 84144 ASSAY OF PROGESTERONE: CPT

## 2024-03-20 ENCOUNTER — TELEPHONE (OUTPATIENT)
Age: 24
End: 2024-03-20

## 2024-03-21 ENCOUNTER — TELEPHONE (OUTPATIENT)
Age: 24
End: 2024-03-21

## 2024-03-21 NOTE — TELEPHONE ENCOUNTER
----- Message from Kely Willett MD sent at 3/21/2024  1:38 PM EDT -----  Yes this is fine , please make sure she is aware if this is early she may need more US . Thanks   However given her  unknown LMP appropriate to scheduled in the next week to 2 weeks    ----- Message -----  From: Linda Manzano  Sent: 3/21/2024   1:10 PM EDT  To: Kely Willett MD    Hi Dr Schwartz, since she was breastfeeding and unsure of LMP - based on these #'s would next wk be ok if there is an opening?    ----- Message -----  From: Kely Willett MD  Sent: 3/21/2024  12:46 PM EDT  To: Obgyn Pod Clinical    Please inform patient I reviewed results, recommend scheduling US when  7-8 weeks of pregnancy thanks

## 2024-03-21 NOTE — RESULT ENCOUNTER NOTE
Please inform patient I reviewed results, recommend scheduling US when  7-8 weeks of pregnancy thanks

## 2024-04-12 ENCOUNTER — INITIAL PRENATAL (OUTPATIENT)
Dept: OBGYN CLINIC | Facility: MEDICAL CENTER | Age: 24
End: 2024-04-12

## 2024-04-12 VITALS
BODY MASS INDEX: 36.43 KG/M2 | SYSTOLIC BLOOD PRESSURE: 122 MMHG | HEIGHT: 69 IN | WEIGHT: 246 LBS | DIASTOLIC BLOOD PRESSURE: 70 MMHG

## 2024-04-12 DIAGNOSIS — O09.899 SHORT INTERVAL BETWEEN PREGNANCIES AFFECTING PREGNANCY, ANTEPARTUM: ICD-10-CM

## 2024-04-12 DIAGNOSIS — Z87.59 HISTORY OF GESTATIONAL HYPERTENSION: Primary | ICD-10-CM

## 2024-04-12 DIAGNOSIS — O99.210 OBESITY IN PREGNANCY: ICD-10-CM

## 2024-04-12 DIAGNOSIS — N91.2 AMENORRHEA: ICD-10-CM

## 2024-04-12 NOTE — PROGRESS NOTES
Pregnancy Confirmation Visit  [facility] [address], [place], [place]    Assessment/Plan:  24 y.o.  presenting with missed menses.      No LMP recorded.  EDC - ??   @ no dating by LMP   Sonogram EDC  7/10/24      Final EDC 7/10/24    by   Sonogram            - Continue/start prenatal vitamin  - We reviewed her current medications and discussed which are safe to continue in pregnancy  - We briefly discussed options for aneuploidy screening, to be discussed further at the prenatal intake  - Schedule prenatal intake with RN and initial prenatal visit; prenatal labs will be ordered during the prenatal intake      Problem List Items Addressed This Visit       History of gestational hypertension - Primary     Prior pregnancy and was diagnosed during delivery last baby   In July          Relevant Orders    Comprehensive metabolic panel    Protein / creatinine ratio, urine    Short interval between pregnancies affecting pregnancy, antepartum     Increase risk of  delivery or SGA  Growth in 3rd trimester          Amenorrhea    Relevant Orders    Ambulatory Referral to Maternal Fetal Medicine    HIV 1/2 AG/AB w Reflex [facility] for 2 yr old and above    Hepatitis C antibody    UA (URINE) with reflex to Scope    Urine culture    Hepatitis B surface antigen    CBC and differential    Rubella antibody, IgG    Type and screen    RPR-Syphilis Screening (Total Syphilis IGG/IGM)    Hepatitis B surface antibody    Anemia Panel w/Reflex, OB    AMB US OB 14 + weeks single or first gestation level 2 (Completed)    Obesity in pregnancy     Starting BMI is 37  Asa till 36 weeks  Early GTT    11-15 weight gain   Growth in 3rd trimester          Relevant Orders    Glucose, 1H PG         Subjective:    CC: Missed period    Edwige Jessica is a 24 y.o.  who presents with missed menses.  No LMP recorded.    Patient notes that this pregnancy was  not planned and desired.  She was not  "using contraception at the time of conception. She reports she is uncertain of her LMP and that she has not had regular menses since stopping BF in NOV . She has has no vaginal bleeding since her LMP.    Objective:  /70   Ht 5' 9\" (1.753 m)   Wt 112 kg (246 lb)   BMI 36.33 kg/m²     Physical Exam:  General: Well appearing, no distress  CV: Regular rate  Respiratory: Unlabored breathing  Abdomen: Soft, nontender  Extremities: Without edema  Mood and Affect: Appropriate    Transvaginal Pelvic Ultrasound  Roger IUP  Yolk sac: Present  Fetal Pole: Present  CRL consistent with EGA 27w  Cardiac activity: Present   bpm  No adnexal masses appreciated                "

## 2024-04-19 ENCOUNTER — APPOINTMENT (OUTPATIENT)
Dept: LAB | Facility: HOSPITAL | Age: 24
End: 2024-04-19
Payer: COMMERCIAL

## 2024-04-19 DIAGNOSIS — Z87.59 HISTORY OF GESTATIONAL HYPERTENSION: ICD-10-CM

## 2024-04-19 DIAGNOSIS — O99.210 OBESITY IN PREGNANCY: ICD-10-CM

## 2024-04-19 DIAGNOSIS — N91.2 AMENORRHEA: ICD-10-CM

## 2024-04-19 LAB
ABO GROUP BLD: NORMAL
ALBUMIN SERPL BCP-MCNC: 3.6 G/DL (ref 3.5–5)
ALP SERPL-CCNC: 74 U/L (ref 34–104)
ALT SERPL W P-5'-P-CCNC: 4 U/L (ref 7–52)
ANION GAP SERPL CALCULATED.3IONS-SCNC: 9 MMOL/L (ref 4–13)
AST SERPL W P-5'-P-CCNC: 8 U/L (ref 13–39)
BACTERIA UR QL AUTO: ABNORMAL /HPF
BASOPHILS # BLD AUTO: 0.03 THOUSANDS/ÂΜL (ref 0–0.1)
BASOPHILS NFR BLD AUTO: 0 % (ref 0–1)
BILIRUB SERPL-MCNC: 0.41 MG/DL (ref 0.2–1)
BILIRUB UR QL STRIP: NEGATIVE
BLD GP AB SCN SERPL QL: NEGATIVE
BUN SERPL-MCNC: 5 MG/DL (ref 5–25)
CALCIUM SERPL-MCNC: 9.1 MG/DL (ref 8.4–10.2)
CHLORIDE SERPL-SCNC: 106 MMOL/L (ref 96–108)
CLARITY UR: ABNORMAL
CO2 SERPL-SCNC: 21 MMOL/L (ref 21–32)
COLOR UR: ABNORMAL
CREAT SERPL-MCNC: 0.51 MG/DL (ref 0.6–1.3)
CREAT UR-MCNC: 75.3 MG/DL
EOSINOPHIL # BLD AUTO: 0.07 THOUSAND/ÂΜL (ref 0–0.61)
EOSINOPHIL NFR BLD AUTO: 1 % (ref 0–6)
ERYTHROCYTE [DISTWIDTH] IN BLOOD BY AUTOMATED COUNT: 15 % (ref 11.6–15.1)
GFR SERPL CREATININE-BSD FRML MDRD: 134 ML/MIN/1.73SQ M
GLUCOSE 1H P 50 G GLC PO SERPL-MCNC: 83 MG/DL (ref 70–134)
GLUCOSE SERPL-MCNC: 81 MG/DL (ref 65–140)
GLUCOSE UR STRIP-MCNC: NEGATIVE MG/DL
HCT VFR BLD AUTO: 36.6 % (ref 34.8–46.1)
HGB BLD-MCNC: 12.1 G/DL (ref 11.5–15.4)
HGB UR QL STRIP.AUTO: NEGATIVE
HIV 1+2 AB+HIV1 P24 AG SERPL QL IA: NORMAL
HIV 2 AB SERPL QL IA: NORMAL
HIV1 AB SERPL QL IA: NORMAL
HIV1 P24 AG SERPL QL IA: NORMAL
IMM GRANULOCYTES # BLD AUTO: 0.05 THOUSAND/UL (ref 0–0.2)
IMM GRANULOCYTES NFR BLD AUTO: 1 % (ref 0–2)
KETONES UR STRIP-MCNC: NEGATIVE MG/DL
LEUKOCYTE ESTERASE UR QL STRIP: ABNORMAL
LYMPHOCYTES # BLD AUTO: 1.42 THOUSANDS/ÂΜL (ref 0.6–4.47)
LYMPHOCYTES NFR BLD AUTO: 17 % (ref 14–44)
MCH RBC QN AUTO: 29.3 PG (ref 26.8–34.3)
MCHC RBC AUTO-ENTMCNC: 33.1 G/DL (ref 31.4–37.4)
MCV RBC AUTO: 89 FL (ref 82–98)
MONOCYTES # BLD AUTO: 0.53 THOUSAND/ÂΜL (ref 0.17–1.22)
MONOCYTES NFR BLD AUTO: 6 % (ref 4–12)
MUCOUS THREADS UR QL AUTO: ABNORMAL
NEUTROPHILS # BLD AUTO: 6.24 THOUSANDS/ÂΜL (ref 1.85–7.62)
NEUTS SEG NFR BLD AUTO: 75 % (ref 43–75)
NITRITE UR QL STRIP: NEGATIVE
NON-SQ EPI CELLS URNS QL MICRO: ABNORMAL /HPF
NRBC BLD AUTO-RTO: 0 /100 WBCS
PH UR STRIP.AUTO: 7.5 [PH]
PLATELET # BLD AUTO: 394 THOUSANDS/UL (ref 149–390)
PMV BLD AUTO: 9.1 FL (ref 8.9–12.7)
POTASSIUM SERPL-SCNC: 3.7 MMOL/L (ref 3.5–5.3)
PROT SERPL-MCNC: 6.7 G/DL (ref 6.4–8.4)
PROT UR STRIP-MCNC: ABNORMAL MG/DL
PROT UR-MCNC: 14 MG/DL
PROT/CREAT UR: 0.19 MG/G{CREAT} (ref 0–0.1)
RBC # BLD AUTO: 4.13 MILLION/UL (ref 3.81–5.12)
RBC #/AREA URNS AUTO: ABNORMAL /HPF
RH BLD: POSITIVE
RUBV IGG SERPL IA-ACNC: 15 IU/ML
SODIUM SERPL-SCNC: 136 MMOL/L (ref 135–147)
SP GR UR STRIP.AUTO: 1.01 (ref 1–1.03)
SPECIMEN EXPIRATION DATE: NORMAL
UROBILINOGEN UR STRIP-ACNC: <2 MG/DL
WBC # BLD AUTO: 8.34 THOUSAND/UL (ref 4.31–10.16)
WBC #/AREA URNS AUTO: ABNORMAL /HPF

## 2024-04-19 PROCEDURE — 86706 HEP B SURFACE ANTIBODY: CPT

## 2024-04-19 PROCEDURE — 80053 COMPREHEN METABOLIC PANEL: CPT

## 2024-04-19 PROCEDURE — 81001 URINALYSIS AUTO W/SCOPE: CPT

## 2024-04-19 PROCEDURE — 86901 BLOOD TYPING SEROLOGIC RH(D): CPT

## 2024-04-19 PROCEDURE — 86762 RUBELLA ANTIBODY: CPT

## 2024-04-19 PROCEDURE — 84156 ASSAY OF PROTEIN URINE: CPT

## 2024-04-19 PROCEDURE — 87340 HEPATITIS B SURFACE AG IA: CPT

## 2024-04-19 PROCEDURE — 86900 BLOOD TYPING SEROLOGIC ABO: CPT

## 2024-04-19 PROCEDURE — 36415 COLL VENOUS BLD VENIPUNCTURE: CPT

## 2024-04-19 PROCEDURE — 82570 ASSAY OF URINE CREATININE: CPT

## 2024-04-19 PROCEDURE — 86850 RBC ANTIBODY SCREEN: CPT

## 2024-04-19 PROCEDURE — 87389 HIV-1 AG W/HIV-1&-2 AB AG IA: CPT

## 2024-04-19 PROCEDURE — 85025 COMPLETE CBC W/AUTO DIFF WBC: CPT

## 2024-04-19 PROCEDURE — 87086 URINE CULTURE/COLONY COUNT: CPT

## 2024-04-19 PROCEDURE — 86780 TREPONEMA PALLIDUM: CPT

## 2024-04-19 PROCEDURE — 82950 GLUCOSE TEST: CPT

## 2024-04-19 PROCEDURE — 86803 HEPATITIS C AB TEST: CPT

## 2024-04-20 LAB
BACTERIA UR CULT: NORMAL
HBV SURFACE AB SER-ACNC: <3 MIU/ML
HBV SURFACE AG SER QL: NORMAL
HCV AB SER QL: NORMAL
TREPONEMA PALLIDUM IGG+IGM AB [PRESENCE] IN SERUM OR PLASMA BY IMMUNOASSAY: NORMAL

## 2024-04-23 NOTE — PROGRESS NOTES
Please refer to the [facility] ultrasound report in Ob Procedures for additional information regarding today's visit

## 2024-04-24 ENCOUNTER — ROUTINE PRENATAL (OUTPATIENT)
Dept: PERINATAL CARE | Facility: OTHER | Age: 24
End: 2024-04-24
Payer: COMMERCIAL

## 2024-04-24 VITALS
SYSTOLIC BLOOD PRESSURE: 138 MMHG | HEART RATE: 70 BPM | HEIGHT: 69 IN | WEIGHT: 251 LBS | BODY MASS INDEX: 37.18 KG/M2 | DIASTOLIC BLOOD PRESSURE: 70 MMHG

## 2024-04-24 DIAGNOSIS — Z3A.29 29 WEEKS GESTATION OF PREGNANCY: ICD-10-CM

## 2024-04-24 DIAGNOSIS — O09.893 SHORT INTERVAL BETWEEN PREGNANCIES COMPLICATING PREGNANCY, ANTEPARTUM, THIRD TRIMESTER: ICD-10-CM

## 2024-04-24 DIAGNOSIS — O36.5930 INTRAUTERINE GROWTH RESTRICTION AFFECTING ANTEPARTUM CARE OF MOTHER IN THIRD TRIMESTER, SINGLE OR UNSPECIFIED FETUS: Primary | ICD-10-CM

## 2024-04-24 DIAGNOSIS — O99.213 MATERNAL OBESITY, ANTEPARTUM, THIRD TRIMESTER: ICD-10-CM

## 2024-04-24 DIAGNOSIS — Z87.59 HISTORY OF GESTATIONAL HYPERTENSION: ICD-10-CM

## 2024-04-24 PROCEDURE — 76811 OB US DETAILED SNGL FETUS: CPT | Performed by: OBSTETRICS & GYNECOLOGY

## 2024-04-24 PROCEDURE — 76820 UMBILICAL ARTERY ECHO: CPT | Performed by: OBSTETRICS & GYNECOLOGY

## 2024-04-24 PROCEDURE — 59025 FETAL NON-STRESS TEST: CPT | Performed by: OBSTETRICS & GYNECOLOGY

## 2024-04-24 PROCEDURE — 99244 OFF/OP CNSLTJ NEW/EST MOD 40: CPT | Performed by: OBSTETRICS & GYNECOLOGY

## 2024-04-24 NOTE — PROGRESS NOTES
Non-Stress Testing:    Non-Stress test, equipment, procedure, and expected outcomes explained. Reviewed fetal kick counts and when to call OB.Verified patient understanding of fetal kick counts with teach back method. Patient reports feeling daily fetal movements. Patient has no questions or concerns.        NST reviewed by Dr. Burk..

## 2024-04-24 NOTE — LETTER
April 24, 2024     Sonny Linares MD  [address] [place] [address]    Patient: Edwige Jessica   YOB: 2000   Date of Visit: 4/24/2024       Dear Dr. Linares:    Thank you for referring Edwige Jessica to me for evaluation. Below are my notes for this consultation.    If you have questions, please do not hesitate to call me. I look forward to following your patient along with you.         Sincerely,        Kaleb Burk MD        CC: No Recipients    Kaleb Burk MD  4/24/2024  8:25 AM  Sign when Signing Visit  Please refer to the [facility] ultrasound report in Ob Procedures for additional information regarding today's visit

## 2024-04-24 NOTE — PROGRESS NOTES
OB INTAKE INTERVIEW 2024    Patient is 24 y.o. who presents for OB intake at 29w0d.  She is accompanied by her spouse during this encounter.  The father of her baby (Ange Hsu) is involved in the pregnancy and he is 26 years old.      No LMP recorded (lmp unknown). Patient is pregnant.  Ultrasound: Measured 27 weeks 2 days on 2024  Estimated Date of Delivery: 7/10/24 established by dating ultrasound due to unknown LMP.    Signs/Symptoms of Pregnancy:  Current pregnancy symptoms: fatigue and frequent urination  Constipation no  Headaches no  Cramping/spotting YES occasional mild cramping  PICA cravings no    Diabetes:  Body mass index is 37.1 kg/m².  If patient has 1 or more, please order early 1 hour GTT  History of GDM no  BMI >35 YES - initial BMI 37  History of PCOS or current metformin use no  History of LGA/macrosomic infant (4000g/9lbs) no    If patient has 2 or more, please order early 1 hour GTT  BMI>30 YES - initial BMI 37  AMA no  First degree relative with type 2 diabetes no  History of chronic HTN, hyperlipidemia, elevated A1C no  High risk race (, , ,  or ) no    Hypertension: if you answer yes to any of the following, please order baseline preeclampsia labs (cbc, comprehensive metabolic panel, urine protein creatinine ratio, uric acid)  History of of chronic HTN no  History of gestational HTN YES  History of preeclampsia, eclampsia, or HELLP syndrome no  History of diabetes no  History of lupus, autoimmune disease, kidney disease no    Thyroid: if yes order TSH with reflex T4  History of thyroid disease no    Bleeding Disorder or Hx of DVT - patient or first degree relative with history of. Order the following if not done previously.   (Factor V, antithrombin III, prothrombin gene mutation, protein C and S Ag, lupus anticoagulant, anticardiolipin, beta-2 glycoprotein)   no    OB/GYN:  History of abnormal pap smear no        Date of last pap smear Never had one  History of HPV no  History of Herpes/HSV no  History of other STI (gonorrhea, chlamydia, trich) no  History of prior  YES  History of prior  no  History of  delivery prior to 36 weeks 6 days no  History of blood transfusion no  Ok for blood transfusion YES    Substance screening:   History of tobacco use no  Currently using tobacco no  Currently using alcohol no  Presently using drugs no  Past drug use  no  IV drug use- no  Partner drug use no  Parent/Family drug use no  Substance Use Screen Level No risk    MRSA Screening:   Does the pt have a hx of MRSA? no    Immunizations:  Discussed Tdap vaccine YES   Discussed COVID Vaccine YES did not have    Genetic/MFM:  Do you or your partner have a history of any of the following in yourselves or first degree relatives?  Cystic fibrosis no  Spinal muscular atrophy no  Hemoglobinopathy/Sickle Cell/Thalassemia no  Fragile X Intellectual Disability no    Discussed Carrier Screening being completed once in a lifetime as a standard of care lab test. Had CF/SMA screening in prior pregnancy - resulted negative for both    Appointment for Level II Ultrasound at [facility] was done on 24.    Interview education:  [facility] Pregnancy Essentials Book reviewed, discussed and attached to their AVS YES     Prenatal lab work scripts NO  completed on 24  Extra labs ordered: 1 hour GTT, CMP, Protein/creatinine ratio urine, and Uric Acid completed on 24    Aspirin/Preeclampsia Screen    Risk Level Risk Factor Recommendation   LOW Prior Uncomplicated full-term delivery YES No Aspirin recommendation        MODERATE Nulliparity no Recommend low-dose aspirin if     BMI>30 YES 2 or more moderate risk factors    Family History Preeclampsia (mother/sister) no     35yr old or greater no      or Low Socioeconomic no     IVF Pregnancy  no     Personal History Risks (low birth weight, prior adverse preg outcome, >10yr  preg interval) no         HIGH History of Preeclampsia YES - Hx GHTN  dx intrapartum Recommend low-dose aspirin if     Multifetal gestation no 1 or more high risk factors    Chronic HTN no     Type 1 or 2 Diabetes no     Renal Disease no     Autoimmune Disease  no      Contraindications to ASA therapy:  NSAID/ ASA allergy: no  Nasal polyps: no  Asthma with history of ASA induced bronchospasm: no    Relative contraindications:  History of GI bleed: no  Active peptic ulcer disease: no  Severe hepatic dysfunction: no    Patient does meet recommendation to take ASA 162mg during this pregnancy from 12-36wks to lower her risk of preeclampsia. Pt is currently 29 wks    Mental Health:  Hx of/or current dx of depression: no, Anxiety: no  EPDS Screen:  Negative / score 1    The patient has a history now or in prior pregnancy notable for:  late onset care and short interval pregnancy- delivered 7/18/24, Obesity    Details that I feel the provider should be aware of: Edwige was seen here in office for her OB Intake visit, Hx obtained, Offered no c/o at present. Reviewed medications safe to take in pregnancy. [facility] Essentials packet/link and 28 wk pack reviewed. Encourage to bring consent back at next prenatal visit, verbalized understanding    PN1 visit scheduled. The patient was oriented to our practice, the navigator role, reviewed delivering physicians and [facility] for delivery. All questions were answered.    Interviewed by: Jenni Cho RN

## 2024-04-24 NOTE — PATIENT INSTRUCTIONS
Congratulations on your pregnancy!  We thank you for allowing us to participate in your care.    NEXT STEPS    Return to our office for your routine prenatal visits.   Some offices have multiple locations. Always check the address of your appointment to ensure you are going to the correct office.     If you experience any problems or concerns, call the office directly.  Remember to only use [facility] for none urgent concerns or questions.    Our doctors deliver at [facility] in [place]. The address is provided below.     [address] [address]    Please click on the link below to review our Pregnancy Essential Guide.    [facility] Pregnancy Essentials Guide  [facility] Women[facility] ([email or web address])

## 2024-04-25 ENCOUNTER — INITIAL PRENATAL (OUTPATIENT)
Dept: OBGYN CLINIC | Facility: MEDICAL CENTER | Age: 24
End: 2024-04-25

## 2024-04-25 VITALS
SYSTOLIC BLOOD PRESSURE: 124 MMHG | DIASTOLIC BLOOD PRESSURE: 80 MMHG | WEIGHT: 251.2 LBS | BODY MASS INDEX: 37.2 KG/M2 | HEIGHT: 69 IN

## 2024-04-25 DIAGNOSIS — O09.33 LATE PRENATAL CARE AFFECTING PREGNANCY IN THIRD TRIMESTER: ICD-10-CM

## 2024-04-25 DIAGNOSIS — O99.210 OBESITY IN PREGNANCY: ICD-10-CM

## 2024-04-25 DIAGNOSIS — Z34.83 PRENATAL CARE, SUBSEQUENT PREGNANCY, THIRD TRIMESTER: Primary | ICD-10-CM

## 2024-04-25 DIAGNOSIS — Z87.59 HISTORY OF GESTATIONAL HYPERTENSION: ICD-10-CM

## 2024-04-25 PROCEDURE — OBC

## 2024-04-26 ENCOUNTER — TELEPHONE (OUTPATIENT)
Dept: PERINATAL CARE | Facility: OTHER | Age: 24
End: 2024-04-26

## 2024-04-26 NOTE — TELEPHONE ENCOUNTER
[facility] 4/26 at 1030 to assist with scheduling of recommended follow up: Return NST,JOSE ALBERTO,Doppler in 1 week and 2 weeks. NST and growth ultrasound in 3 weeks. # [phone] provided to patient.

## 2024-04-29 ENCOUNTER — INITIAL PRENATAL (OUTPATIENT)
Dept: OBGYN CLINIC | Facility: MEDICAL CENTER | Age: 24
End: 2024-04-29

## 2024-04-29 VITALS — DIASTOLIC BLOOD PRESSURE: 68 MMHG | WEIGHT: 248.9 LBS | SYSTOLIC BLOOD PRESSURE: 115 MMHG | BODY MASS INDEX: 36.76 KG/M2

## 2024-04-29 DIAGNOSIS — O36.5930 POOR FETAL GROWTH AFFECTING MANAGEMENT OF MOTHER IN THIRD TRIMESTER, SINGLE OR UNSPECIFIED FETUS: ICD-10-CM

## 2024-04-29 DIAGNOSIS — Z87.59 HISTORY OF GESTATIONAL HYPERTENSION: ICD-10-CM

## 2024-04-29 DIAGNOSIS — E66.01 SEVERE OBESITY DUE TO EXCESS CALORIES AFFECTING PREGNANCY, ANTEPARTUM (HCC): ICD-10-CM

## 2024-04-29 DIAGNOSIS — O09.899 SHORT INTERVAL BETWEEN PREGNANCIES AFFECTING PREGNANCY, ANTEPARTUM: ICD-10-CM

## 2024-04-29 DIAGNOSIS — Z3A.30 30 WEEKS GESTATION OF PREGNANCY: Primary | ICD-10-CM

## 2024-04-29 DIAGNOSIS — O99.210 SEVERE OBESITY DUE TO EXCESS CALORIES AFFECTING PREGNANCY, ANTEPARTUM (HCC): ICD-10-CM

## 2024-04-29 PROCEDURE — PNV: Performed by: OBSTETRICS & GYNECOLOGY

## 2024-04-29 NOTE — ASSESSMENT & PLAN NOTE
Starting BMI is 36  ASA till 36 weeks  Early GTT - started care lare  28 week lab normal 108   Growth in 3rd trimester  Testing at 36 weeks

## 2024-04-29 NOTE — PROGRESS NOTES
Initial Prenatal Visit  [facility] [address], [place], [place]    Assessment/Plan:  Edwige is a 24 y.o. year old  at 29w5d who presents for initial prenatal visit.     Supervision of normal pregnancy  - Prenatal labs reviewed and normal.  Blood type: o positive   - Aneuploidy screening discussed.  Patient .  - Routine cervical cancer screening: Pap .  - Routine STI Screening: GC/Chlamydia sent today.  HIV/Hep B/Syphilis ordered in prenatal panel.  - Patient Education: Patient was counseled regarding diet, exercise, weight gain, foods to avoid, vaccines in pregnancy, aneuploidy screening, travel precautions to include seat belt use and VTE risk reduction.  She has been provided our pregnancy packet which includes how and when to contact providers, medication recommendations, dietary suggestions, breastfeeding information as well as websites for additional information, hospital and delivery concerns.       Additional Pregnancy Problems:   1. 30 weeks gestation of pregnancy  2. History of gestational hypertension  Assessment & Plan:  Prior pregnancy and was diagnosed during delivery last baby   In July - baseline is negative   3. Severe obesity due to excess calories affecting pregnancy, antepartum (HCC)  Assessment & Plan:  Starting BMI is 36  ASA till 36 weeks  Early GTT - started care lare  28 week lab normal 108   Growth in 3rd trimester  Testing at 36 weeks    4. Short interval between pregnancies affecting pregnancy, antepartum  Assessment & Plan:  2023-    Increase risk of  delivery or SGA  Growth in 3rd trimester   5. Poor fetal growth affecting management of mother in third trimester, single or unspecified fetus  Assessment & Plan:  AC < 5 %  Will get repeat scan for growth and doppler          Subjective:   CC:  Desires prenatal care  Edwige Jessica is a 24 y.o.  female who presents for prenatal care.  Pregnancy ROS: no leakage of fluid,  pelvic pain, or vaginal bleeding.  yes nausea/vomiting.    The following portions of the patient's history were reviewed and updated as appropriate: allergies, current medications, past family history, past medical history, obstetric history, gynecologic history, past social history, past surgical history and problem list.      Objective:  /68   Wt 113 kg (248 lb 14.4 oz)   LMP  (LMP Unknown)   BMI 36.76 kg/m²   Pregravid Weight/BMI: 112 kg (246 lb) (BMI 36.31)  Current Weight: 113 kg (248 lb 14.4 oz)   Total Weight Gain: 1.315 kg (2 lb 14.4 oz)   Pre-Darren Vitals      Flowsheet Row Most Recent Value   Prenatal Assessment    Fetal Heart Rate 138   Movement Present   Prenatal Vitals    Blood Pressure 115/68   Weight - Scale 113 kg (248 lb 14.4 oz)   Urine Albumin/Glucose    Dilation/Effacement/Station    Vaginal Drainage    Draining Fluid No   Edema    LLE Edema None   RLE Edema None   Facial Edema None           General: Well appearing, no distress  Respiratory: Normal respiratory rate, lungs clear to auscultation, no wheezing or rales  Cardiovascular: Regular rate and rhythm, no murmurs, rubs, or gallops  Breasts: Normal bilaterally, nontender without masses, asymmetry, or nipple discharge.  Abdomen: Soft, gravid, nontender  : Urethra normal. Normal labia majora and minora. Vagina normal.  No vaginal bleeding. No vaginal discharge. Cervix visually closed.   Extremities: Warm and well perfused.  Non tender.  No edema.

## 2024-05-02 ENCOUNTER — ULTRASOUND (OUTPATIENT)
Age: 24
End: 2024-05-02
Payer: COMMERCIAL

## 2024-05-02 VITALS
DIASTOLIC BLOOD PRESSURE: 62 MMHG | WEIGHT: 253.4 LBS | BODY MASS INDEX: 37.53 KG/M2 | HEIGHT: 69 IN | HEART RATE: 84 BPM | SYSTOLIC BLOOD PRESSURE: 118 MMHG

## 2024-05-02 DIAGNOSIS — E66.01 SEVERE OBESITY DUE TO EXCESS CALORIES AFFECTING PREGNANCY, ANTEPARTUM (HCC): ICD-10-CM

## 2024-05-02 DIAGNOSIS — Z3A.30 30 WEEKS GESTATION OF PREGNANCY: ICD-10-CM

## 2024-05-02 DIAGNOSIS — O36.5930 POOR FETAL GROWTH AFFECTING MANAGEMENT OF MOTHER IN THIRD TRIMESTER, SINGLE OR UNSPECIFIED FETUS: Primary | ICD-10-CM

## 2024-05-02 DIAGNOSIS — O99.210 SEVERE OBESITY DUE TO EXCESS CALORIES AFFECTING PREGNANCY, ANTEPARTUM (HCC): ICD-10-CM

## 2024-05-02 PROCEDURE — 59025 FETAL NON-STRESS TEST: CPT | Performed by: OBSTETRICS & GYNECOLOGY

## 2024-05-02 PROCEDURE — 76815 OB US LIMITED FETUS(S): CPT | Performed by: OBSTETRICS & GYNECOLOGY

## 2024-05-02 PROCEDURE — 76820 UMBILICAL ARTERY ECHO: CPT | Performed by: OBSTETRICS & GYNECOLOGY

## 2024-05-02 NOTE — PROGRESS NOTES
A fetal ultrasound and NST were completed. See Ob procedures in Epic for an interpretation and recommendations. Do not hesitate to contact us in [facility] if you have questions.    Grover Ayon MD, MSCE  Maternal Fetal Medicine

## 2024-05-02 NOTE — LETTER
May 2, 2024     Sonny Linares MD  [address] [place] [address]    Patient: Edwige Jessica   YOB: 2000   Date of Visit: 5/2/2024       Dear Dr. Linares:    Thank you for referring Edwige Jessica to me for evaluation. Below are my notes for this consultation.    If you have questions, please do not hesitate to call me. I look forward to following your patient along with you.         Sincerely,        Grover Ayon MD        CC: No Recipients    Grover Ayon MD  5/2/2024 11:07 AM  Sign when Signing Visit  A fetal ultrasound and NST were completed. See Ob procedures in Epic for an interpretation and recommendations. Do not hesitate to contact us in [facility] if you have questions.    Grover Ayon MD, MSCE  Maternal Fetal Medicine

## 2024-05-06 ENCOUNTER — ULTRASOUND (OUTPATIENT)
Dept: PERINATAL CARE | Facility: OTHER | Age: 24
End: 2024-05-06
Payer: COMMERCIAL

## 2024-05-06 VITALS
DIASTOLIC BLOOD PRESSURE: 72 MMHG | HEART RATE: 83 BPM | HEIGHT: 69 IN | SYSTOLIC BLOOD PRESSURE: 132 MMHG | WEIGHT: 251.2 LBS | BODY MASS INDEX: 37.2 KG/M2

## 2024-05-06 DIAGNOSIS — Z3A.30 30 WEEKS GESTATION OF PREGNANCY: ICD-10-CM

## 2024-05-06 DIAGNOSIS — O36.5930 POOR FETAL GROWTH AFFECTING MANAGEMENT OF MOTHER IN THIRD TRIMESTER, SINGLE OR UNSPECIFIED FETUS: Primary | ICD-10-CM

## 2024-05-06 PROCEDURE — 59025 FETAL NON-STRESS TEST: CPT | Performed by: OBSTETRICS & GYNECOLOGY

## 2024-05-06 PROCEDURE — 76820 UMBILICAL ARTERY ECHO: CPT | Performed by: OBSTETRICS & GYNECOLOGY

## 2024-05-06 PROCEDURE — 76815 OB US LIMITED FETUS(S): CPT | Performed by: OBSTETRICS & GYNECOLOGY

## 2024-05-06 NOTE — PROGRESS NOTES
Repeat Non-Stress Testing:    Patient verbalizes +FM. Pt denies ALL:               Leaking of fluid   Contractions   Vaginal bleeding   Decreased fetal movement    Patient is performing daily kick counts. Patient has no questions or concerns.   NST strip reviewed by Dr. Meraz.

## 2024-05-11 PROBLEM — Z3A.31 31 WEEKS GESTATION OF PREGNANCY: Status: ACTIVE | Noted: 2024-04-29

## 2024-05-13 ENCOUNTER — ROUTINE PRENATAL (OUTPATIENT)
Dept: OBGYN CLINIC | Facility: MEDICAL CENTER | Age: 24
End: 2024-05-13

## 2024-05-13 VITALS
DIASTOLIC BLOOD PRESSURE: 76 MMHG | BODY MASS INDEX: 37.38 KG/M2 | SYSTOLIC BLOOD PRESSURE: 118 MMHG | HEIGHT: 69 IN | WEIGHT: 252.4 LBS

## 2024-05-13 DIAGNOSIS — Z3A.31 31 WEEKS GESTATION OF PREGNANCY: ICD-10-CM

## 2024-05-13 DIAGNOSIS — Z87.59 HISTORY OF GESTATIONAL HYPERTENSION: ICD-10-CM

## 2024-05-13 DIAGNOSIS — O36.5930 POOR FETAL GROWTH AFFECTING MANAGEMENT OF MOTHER IN THIRD TRIMESTER, SINGLE OR UNSPECIFIED FETUS: Primary | ICD-10-CM

## 2024-05-13 DIAGNOSIS — O99.210 OBESITY IN PREGNANCY: ICD-10-CM

## 2024-05-13 DIAGNOSIS — O09.899 SHORT INTERVAL BETWEEN PREGNANCIES AFFECTING PREGNANCY, ANTEPARTUM: ICD-10-CM

## 2024-05-13 PROCEDURE — PNV: Performed by: NURSE PRACTITIONER

## 2024-05-13 NOTE — PROGRESS NOTES
Denies loss of fluid, vaginal bleeding and abdominal pain.  Confirms frequent fetal movement.  Doing fetal kick counts.  Tolerating prenatal vitamin well.  Declines Tdap vaccine.  BP: 118/76 weight: + 6lbs  Plan  -Continue prenatal vitamins daily  -Continue fetal kick counts daily  -FGR Continue  fetal surveillance weekly with  center.  Follow-up scheduled 24-growth scan  -Declines Tdap vaccine  -Common discomforts of pregnancy and precautions including  labor reviewed.  Signs and symptoms to report reviewed.  RTO 2 weeks f/u US

## 2024-05-16 ENCOUNTER — ULTRASOUND (OUTPATIENT)
Age: 24
End: 2024-05-16
Payer: COMMERCIAL

## 2024-05-16 VITALS
DIASTOLIC BLOOD PRESSURE: 58 MMHG | WEIGHT: 255.6 LBS | HEIGHT: 69 IN | SYSTOLIC BLOOD PRESSURE: 116 MMHG | BODY MASS INDEX: 37.86 KG/M2 | HEART RATE: 94 BPM

## 2024-05-16 DIAGNOSIS — O09.899 SHORT INTERVAL BETWEEN PREGNANCIES AFFECTING PREGNANCY, ANTEPARTUM: ICD-10-CM

## 2024-05-16 DIAGNOSIS — O99.213 OBESITY DURING PREGNANCY, ANTEPARTUM, THIRD TRIMESTER: ICD-10-CM

## 2024-05-16 DIAGNOSIS — Z87.59 HISTORY OF GESTATIONAL HYPERTENSION: ICD-10-CM

## 2024-05-16 DIAGNOSIS — Z3A.32 32 WEEKS GESTATION OF PREGNANCY: ICD-10-CM

## 2024-05-16 DIAGNOSIS — Z03.74 SUSPECTED PROBLEM WITH FETAL GROWTH NOT FOUND: Primary | ICD-10-CM

## 2024-05-16 PROCEDURE — 99213 OFFICE O/P EST LOW 20 MIN: CPT | Performed by: OBSTETRICS & GYNECOLOGY

## 2024-05-16 PROCEDURE — 59025 FETAL NON-STRESS TEST: CPT | Performed by: OBSTETRICS & GYNECOLOGY

## 2024-05-16 PROCEDURE — 76820 UMBILICAL ARTERY ECHO: CPT | Performed by: OBSTETRICS & GYNECOLOGY

## 2024-05-16 PROCEDURE — 76816 OB US FOLLOW-UP PER FETUS: CPT | Performed by: OBSTETRICS & GYNECOLOGY

## 2024-05-16 NOTE — LETTER
May 16, 2024     Sonny Linares MD  [address] [place] [address]  [address] [place] [address]    Patient: Edwige Jessica   YOB: 2000   Date of Visit: 5/16/2024       Dear Dr. Linares:    Thank you for referring Edwige Jessica to me for evaluation. Below are my notes for this consultation.    If you have questions, please do not hesitate to call me. I look forward to following your patient along with you.         Sincerely,        Birana Obrien MD        CC: No Recipients    Briana Obrien MD  5/16/2024  3:40 PM  Sign when Signing Visit  Edwige Jessica has no complaints today.  She reports regular fetal movements and does not report any problems.  She is here today at 32w1d for an ultrasound for fetal growth and missed anatomy    Problem list:  Pregravid obesity based on a BMI of 36 prior to pregnancy.  She had a normal Glucola screen of 83.  Late prenatal care.  Dating based on a 27-week office scan.  At 29 weeks the fetal AC in the 5th percentile suggestive of possible growth restriction.   Short interval between pregnancies    Ultrasound findings:  The ultrasound today shows normal interval fetal growth and fluid compared to her 27-week dating scan.  JOSE ALBERTO and umbilical Dopplers are normal.  Reviewed the list of missed anatomy not seen well and no malformations are detected but we could not clear all the anatomy today secondary to fetal position, late gestational age and maternal skin thickness.    Pregnancy ultrasound has limitations and is unable to detect all forms of fetal congenital abnormalities.  The inaccuracy in the EFW can be off by 1 lb either way in the third trimester.    Specific counseling was provided on the following problems:  1.  Fetal growth today no longer showing discordant abdominal measurements.  JOSE ALBERTO and Dopplers continue to be normal.     Follow up recommended:   Since fetal growth is reassuring today we can stop her fetal testing but recommend a follow-up growth  scan in 3 weeks along with review of the missed anatomy.    Pre visit time reviewing her records   5 minutes  Face to face time 10 minutes  Post visit time on documentation of note, updating her problem list, adding orders and prescriptions 5 minutes.  Procedures that were completed today were charged separately.   The level of decision making was low level complexity.    Briana Obrien MD

## 2024-05-16 NOTE — PROGRESS NOTES
Edwige Jessica has no complaints today.  She reports regular fetal movements and does not report any problems.  She is here today at 32w1d for an ultrasound for fetal growth and missed anatomy    Problem list:  Pregravid obesity based on a BMI of 36 prior to pregnancy.  She had a normal Glucola screen of 83.  Late prenatal care.  Dating based on a 27-week office scan.  At 29 weeks the fetal AC in the 5th percentile suggestive of possible growth restriction.   Short interval between pregnancies    Ultrasound findings:  The ultrasound today shows normal interval fetal growth and fluid compared to her 27-week dating scan.  JOSE ALBERTO and umbilical Dopplers are normal.  Reviewed the list of missed anatomy not seen well and no malformations are detected but we could not clear all the anatomy today secondary to fetal position, late gestational age and maternal skin thickness.    Pregnancy ultrasound has limitations and is unable to detect all forms of fetal congenital abnormalities.  The inaccuracy in the EFW can be off by 1 lb either way in the third trimester.    Specific counseling was provided on the following problems:  1.  Fetal growth today no longer showing discordant abdominal measurements.  JOSE ALBERTO and Dopplers continue to be normal.     Follow up recommended:   Since fetal growth is reassuring today we can stop her fetal testing but recommend a follow-up growth scan in 3 weeks along with review of the missed anatomy.    Pre visit time reviewing her records   5 minutes  Face to face time 10 minutes  Post visit time on documentation of note, updating her problem list, adding orders and prescriptions 5 minutes.  Procedures that were completed today were charged separately.   The level of decision making was low level complexity.    Briana Obrien MD

## 2024-05-17 PROBLEM — O36.5930 POOR FETAL GROWTH AFFECTING MANAGEMENT OF MOTHER IN THIRD TRIMESTER: Status: ACTIVE | Noted: 2024-05-17

## 2024-05-29 PROBLEM — Z3A.34 34 WEEKS GESTATION OF PREGNANCY: Status: ACTIVE | Noted: 2024-04-29

## 2024-05-31 ENCOUNTER — ROUTINE PRENATAL (OUTPATIENT)
Dept: OBGYN CLINIC | Facility: MEDICAL CENTER | Age: 24
End: 2024-05-31

## 2024-05-31 VITALS — WEIGHT: 249.2 LBS | SYSTOLIC BLOOD PRESSURE: 120 MMHG | DIASTOLIC BLOOD PRESSURE: 68 MMHG | BODY MASS INDEX: 36.8 KG/M2

## 2024-05-31 DIAGNOSIS — Z3A.34 34 WEEKS GESTATION OF PREGNANCY: ICD-10-CM

## 2024-05-31 DIAGNOSIS — O09.899 SHORT INTERVAL BETWEEN PREGNANCIES AFFECTING PREGNANCY, ANTEPARTUM: Primary | ICD-10-CM

## 2024-05-31 DIAGNOSIS — O99.213 OBESITY DURING PREGNANCY, ANTEPARTUM, THIRD TRIMESTER: ICD-10-CM

## 2024-05-31 DIAGNOSIS — Z87.59 HISTORY OF GESTATIONAL HYPERTENSION: ICD-10-CM

## 2024-05-31 PROCEDURE — PNV: Performed by: NURSE PRACTITIONER

## 2024-05-31 NOTE — PROGRESS NOTES
Denies loss of fluid, vaginal bleeding and abdominal pain.  Confirms frequent fetal movement.  Doing fetal kick counts.  Tolerating prenatal vitamin well.Denies questions or concerns at today's visit.  Continues to decline Tdap vaccine   center ultrasound reviewed-24-Vertex presentation, posterior placenta, no placenta previa, JOSE ALBERTO-WNL EFW 1781g/3 pounds 15 ounces (23%).  Recommendation for follow-up growth in 3 weeks  BP: 120/68 weight: +3lbs  Plan  -Continue prenatal vitamins daily  -Continue fetal kick counts daily  -Vaginal/perineal massage reviewed, encouraged 1-4 times per week.  Has written information provided in 28-week folder  -Follow-up with  center scheduled for 24  -birth plan reviewed   -Common discomforts of pregnancy and precautions reviewed.  Signs and symptoms of labor reviewed.  RTO 2 weeks GBS at next visit

## 2024-06-13 ENCOUNTER — ULTRASOUND (OUTPATIENT)
Dept: PERINATAL CARE | Facility: OTHER | Age: 24
End: 2024-06-13
Payer: COMMERCIAL

## 2024-06-13 VITALS
DIASTOLIC BLOOD PRESSURE: 78 MMHG | HEART RATE: 98 BPM | HEIGHT: 69 IN | WEIGHT: 253.6 LBS | SYSTOLIC BLOOD PRESSURE: 126 MMHG | BODY MASS INDEX: 37.56 KG/M2

## 2024-06-13 DIAGNOSIS — O99.213 OBESITY DURING PREGNANCY, ANTEPARTUM, THIRD TRIMESTER: Primary | ICD-10-CM

## 2024-06-13 DIAGNOSIS — Z3A.36 36 WEEKS GESTATION OF PREGNANCY: ICD-10-CM

## 2024-06-13 PROBLEM — O36.5930 POOR FETAL GROWTH AFFECTING MANAGEMENT OF MOTHER IN THIRD TRIMESTER: Status: RESOLVED | Noted: 2024-05-17 | Resolved: 2024-06-13

## 2024-06-13 PROCEDURE — 76816 OB US FOLLOW-UP PER FETUS: CPT | Performed by: OBSTETRICS & GYNECOLOGY

## 2024-06-13 PROCEDURE — 99213 OFFICE O/P EST LOW 20 MIN: CPT | Performed by: OBSTETRICS & GYNECOLOGY

## 2024-06-13 NOTE — PROGRESS NOTES
The patient was seen today for an ultrasound.  Please see ultrasound report (located under Ob Procedures) for additional details.   Thank you very much for allowing us to participate in the care of this very nice patient.  Should you have any questions, please do not hesitate to contact me.     Jermain Craft MD FACOG  Attending Physician, Maternal-Fetal Medicine  [facility]

## 2024-06-19 ENCOUNTER — ROUTINE PRENATAL (OUTPATIENT)
Dept: OBGYN CLINIC | Facility: MEDICAL CENTER | Age: 24
End: 2024-06-19

## 2024-06-19 VITALS — WEIGHT: 255.6 LBS | DIASTOLIC BLOOD PRESSURE: 78 MMHG | BODY MASS INDEX: 37.75 KG/M2 | SYSTOLIC BLOOD PRESSURE: 126 MMHG

## 2024-06-19 DIAGNOSIS — Z3A.37 37 WEEKS GESTATION OF PREGNANCY: Primary | ICD-10-CM

## 2024-06-19 DIAGNOSIS — Z87.59 HISTORY OF GESTATIONAL HYPERTENSION: ICD-10-CM

## 2024-06-19 DIAGNOSIS — O99.213 OBESITY DURING PREGNANCY, ANTEPARTUM, THIRD TRIMESTER: ICD-10-CM

## 2024-06-19 DIAGNOSIS — O09.899 SHORT INTERVAL BETWEEN PREGNANCIES AFFECTING PREGNANCY, ANTEPARTUM: ICD-10-CM

## 2024-06-19 PROCEDURE — PNV: Performed by: OBSTETRICS & GYNECOLOGY

## 2024-06-19 PROCEDURE — 87150 DNA/RNA AMPLIFIED PROBE: CPT | Performed by: OBSTETRICS & GYNECOLOGY

## 2024-06-19 NOTE — ASSESSMENT & PLAN NOTE
2023-    Increase risk of  delivery or SGA  Growth in 3rd trimester - reviewed    Problem: Infection  Goal: Will remain free from infection  Outcome: PROGRESSING AS EXPECTED  RN educated pt on infection prevention, RN used thorough hand hygiene before and after interactions with pt, encouraged pt and family to use proper hand hygiene.       Problem: Knowledge Deficit  Goal: Knowledge of the prescribed therapeutic regimen will improve  Outcome: PROGRESSING AS EXPECTED  RN discussed prescribed medications with pt and family, educated on use and side effects, answered all questions.

## 2024-06-19 NOTE — PROGRESS NOTES
Routine Prenatal Visit  [facility] [address], [place], [place]    Assessment/Plan:  Edwige is a 24 y.o. year old  at 37w0d who presents for routine prenatal visit.     1. 37 weeks gestation of pregnancy  Assessment & Plan:  Last growth reviewed and normal   Orders:  -     Strep B DNA probe, amplification  2. History of gestational hypertension  Assessment & Plan:  Prior pregnancy and was diagnosed during delivery last baby   In July - baseline is negative   3. Obesity during pregnancy, antepartum, third trimester  Assessment & Plan:  Starting BMI is 36  ASA till 36 weeks  Early GTT - started care lare  28 week lab normal 108   Growth in 3rd trimester  Testing at 36 weeks- she declined doing then will think about next week advised can do in office when she comes.     4. Short interval between pregnancies affecting pregnancy, antepartum  Assessment & Plan:  2023-    Increase risk of  delivery or SGA  Growth in 3rd trimester - reviewed         Subjective:     CC: Prenatal care    Edwige Jessica is a 24 y.o.  female who presents for routine prenatal care at 37w0d.  Pregnancy ROS: no leakage of fluid, pelvic pain, or vaginal bleeding.  yes fetal movement.    The following portions of the patient's history were reviewed and updated as appropriate: allergies, current medications, past family history, past medical history, obstetric history, gynecologic history, past social history, past surgical history and problem list.      Objective:  /78   Wt 116 kg (255 lb 9.6 oz)   LMP  (LMP Unknown)   BMI 37.75 kg/m²   Pregravid Weight/BMI: 112 kg (246 lb) (BMI 36.31)  Current Weight: 116 kg (255 lb 9.6 oz)   Total Weight Gain: 4.354 kg (9 lb 9.6 oz)   Pre-Darren Vitals      Flowsheet Row Most Recent Value   Prenatal Assessment    Fetal Heart Rate 130   Prenatal Vitals    Blood Pressure 126/78   Weight - Scale 116 kg (255 lb 9.6 oz)   Urine  Albumin/Glucose    Dilation/Effacement/Station    Vaginal Drainage    Draining Fluid No   Edema    LLE Edema Trace   RLE Edema Trace             General: Well appearing, no distress  Respiratory: Unlabored breathing  Cardiovascular: Regular rate.  Abdomen: Soft, gravid, nontender  Fundal Height: Appropriate for gestational age.  Extremities: Warm and well perfused.  Non tender.

## 2024-06-19 NOTE — ASSESSMENT & PLAN NOTE
Starting BMI is 36  ASA till 36 weeks  Early GTT - started care lare  28 week lab normal 108   Growth in 3rd trimester  Testing at 36 weeks- she declined doing then will think about next week advised can do in office when she comes.

## 2024-06-21 LAB — GP B STREP DNA SPEC QL NAA+PROBE: NEGATIVE

## 2024-06-28 ENCOUNTER — ROUTINE PRENATAL (OUTPATIENT)
Dept: OBGYN CLINIC | Facility: MEDICAL CENTER | Age: 24
End: 2024-06-28

## 2024-06-28 VITALS — WEIGHT: 258 LBS | SYSTOLIC BLOOD PRESSURE: 130 MMHG | BODY MASS INDEX: 38.1 KG/M2 | DIASTOLIC BLOOD PRESSURE: 80 MMHG

## 2024-06-28 DIAGNOSIS — Z3A.38 38 WEEKS GESTATION OF PREGNANCY: ICD-10-CM

## 2024-06-28 DIAGNOSIS — Z87.59 HISTORY OF GESTATIONAL HYPERTENSION: ICD-10-CM

## 2024-06-28 DIAGNOSIS — O09.899 SHORT INTERVAL BETWEEN PREGNANCIES AFFECTING PREGNANCY, ANTEPARTUM: Primary | ICD-10-CM

## 2024-06-28 DIAGNOSIS — O99.213 OBESITY DURING PREGNANCY, ANTEPARTUM, THIRD TRIMESTER: ICD-10-CM

## 2024-06-28 PROCEDURE — PNV: Performed by: STUDENT IN AN ORGANIZED HEALTH CARE EDUCATION/TRAINING PROGRAM

## 2024-06-28 NOTE — PROGRESS NOTES
Routine Prenatal Visit  [facility] [address], [place], [place]    Assessment/Plan:  Edwige is a 24 y.o. year old  at 38w2d who presents for routine prenatal visit.     1. Short interval between pregnancies affecting pregnancy, antepartum  2. Obesity during pregnancy, antepartum, third trimester  3. History of gestational hypertension  4. 38 weeks gestation of pregnancy        Subjective:     CC: Prenatal care    Edwige Jessica is a 24 y.o.  female who presents for routine prenatal care at 38w2d.  Pregnancy ROS: Denies leakage of fluid, pelvic pain, or vaginal bleeding.  Reports normal fetal movement.    The following portions of the patient's history were reviewed and updated as appropriate: allergies, current medications, past family history, past medical history, obstetric history, gynecologic history, past social history, past surgical history and problem list.      Objective:  /80   Wt 117 kg (258 lb)   LMP  (LMP Unknown)   BMI 38.10 kg/m²   Pregravid Weight/BMI: 112 kg (246 lb) (BMI 36.31)  Current Weight: 117 kg (258 lb)   Total Weight Gain: 5.443 kg (12 lb)   Pre-Darren Vitals      Flowsheet Row Most Recent Value   Prenatal Assessment    Fetal Heart Rate 130   Movement Present   Prenatal Vitals    Blood Pressure 130/80   Weight - Scale 117 kg (258 lb)   Urine Albumin/Glucose    Dilation/Effacement/Station    Vaginal Drainage    Draining Fluid No   Edema    LLE Edema None             General: Well appearing, no distress  Respiratory: Unlabored breathing  Cardiovascular: Regular rate.  Abdomen: Soft, gravid, nontender  Fundal Height: Appropriate for gestational age.  Extremities: Warm and well perfused.  Non tender.

## 2024-07-03 ENCOUNTER — ROUTINE PRENATAL (OUTPATIENT)
Dept: OBGYN CLINIC | Facility: MEDICAL CENTER | Age: 24
End: 2024-07-03

## 2024-07-03 VITALS
WEIGHT: 258 LBS | SYSTOLIC BLOOD PRESSURE: 124 MMHG | BODY MASS INDEX: 39.1 KG/M2 | HEIGHT: 68 IN | DIASTOLIC BLOOD PRESSURE: 80 MMHG

## 2024-07-03 DIAGNOSIS — Z3A.38 38 WEEKS GESTATION OF PREGNANCY: Primary | ICD-10-CM

## 2024-07-03 PROCEDURE — PNV: Performed by: OBSTETRICS & GYNECOLOGY

## 2024-07-03 NOTE — PROGRESS NOTES
Edwige is a 24 y.o. year old  at 39w0d for routine prenatal visit.   + FM, no vaginal bleeding, contractions, or LOF  Complaints: No   Most recent ultrasound and labs reviewed.  Has declined NST which was recommended from 36 weeks on given BMI - states if not delivered by  40 weeks will have one done   Desires to avoid IOL but will be  willing to consider if not delivered by 40 weeks   States reaching fetal kick counts with no problem   Hx of GHTN  - asymptomatic   FKC and labor precautions reviewed

## 2024-07-10 ENCOUNTER — ROUTINE PRENATAL (OUTPATIENT)
Dept: OBGYN CLINIC | Facility: MEDICAL CENTER | Age: 24
End: 2024-07-10
Payer: COMMERCIAL

## 2024-07-10 VITALS — BODY MASS INDEX: 39.99 KG/M2 | SYSTOLIC BLOOD PRESSURE: 130 MMHG | WEIGHT: 263 LBS | DIASTOLIC BLOOD PRESSURE: 72 MMHG

## 2024-07-10 DIAGNOSIS — O09.899 SHORT INTERVAL BETWEEN PREGNANCIES AFFECTING PREGNANCY, ANTEPARTUM: ICD-10-CM

## 2024-07-10 DIAGNOSIS — Z3A.40 40 WEEKS GESTATION OF PREGNANCY: Primary | ICD-10-CM

## 2024-07-10 PROCEDURE — PNV: Performed by: OBSTETRICS & GYNECOLOGY

## 2024-07-10 PROCEDURE — 59025 FETAL NON-STRESS TEST: CPT | Performed by: OBSTETRICS & GYNECOLOGY

## 2024-07-10 NOTE — PROGRESS NOTES
Edwige is a 24 y.o. year old  at 40w0d for routine prenatal visit.   + FM, no vaginal bleeding, contractions, or LOF  Complaints: No   Most recent ultrasound and labs reviewed.  IOL scheduled  for  2024 @ 8 am   Membranes stripped    NST done today reactive   - done for elevated BMI - patient has declined  APFS as recommended

## 2024-07-11 ENCOUNTER — TELEPHONE (OUTPATIENT)
Dept: OBGYN CLINIC | Facility: MEDICAL CENTER | Age: 24
End: 2024-07-11

## 2024-07-11 ENCOUNTER — DOCUMENTATION (OUTPATIENT)
Dept: LABOR AND DELIVERY | Facility: HOSPITAL | Age: 24
End: 2024-07-11

## 2024-07-11 ENCOUNTER — DOCUMENTATION (OUTPATIENT)
Dept: OBGYN CLINIC | Facility: CLINIC | Age: 24
End: 2024-07-11

## 2024-07-11 NOTE — TELEPHONE ENCOUNTER
Spoke w/patient in regards to scheduled IOL, reviewed that the hospital did not approve 7/12 IOL, would need to change to 7/17/24, pt verbalized understanding and agreeable.  Also reviewed that Dr Bagley would like her to be seen at [facility] L/D 7/12 for NST/JOSE ALBERTO, pt agreeable states she would rather go in the afternoon, agreeable to going at 1 PM, registration box aware.

## 2024-07-11 NOTE — TELEPHONE ENCOUNTER
Attempted to contact pt in regards to changes to scheduled IOL on 7/12/24. Pt unavailable, left detailed message she should contact office

## 2024-07-11 NOTE — PROGRESS NOTES
Case discussed with Dr. Garduno. Edwige is a 25 yo  with MATT 7/10/24 by 27 week ultrasound. She is scheduled for elective induction on 24 at 40w2d gestation.    Per [employer]CO[employer] , Edwige is not a candidate for elective induction ([id number]) due to suboptimal dating, which was established after 22 weeks. Her BMI is a comorbidity and not necessarily an indication for induction. However, late term delivery (41w0d gestation or later) is indicated. She may be rescheduled for induction if undelivered anytime after 24.    Janneth Johnson MD  Maternal-Fetal Medicine

## 2024-07-12 ENCOUNTER — HOSPITAL ENCOUNTER (OUTPATIENT)
Facility: HOSPITAL | Age: 24
Discharge: HOME | End: 2024-07-12
Attending: OBSTETRICS & GYNECOLOGY | Admitting: OBSTETRICS & GYNECOLOGY
Payer: COMMERCIAL

## 2024-07-12 ENCOUNTER — HOSPITAL ENCOUNTER (INPATIENT)
Facility: HOSPITAL | Age: 24
LOS: 2 days | Discharge: HOME | End: 2024-07-14
Attending: OBSTETRICS & GYNECOLOGY | Admitting: OBSTETRICS & GYNECOLOGY
Payer: COMMERCIAL

## 2024-07-12 ENCOUNTER — NURSE TRIAGE (OUTPATIENT)
Age: 24
End: 2024-07-12

## 2024-07-12 VITALS
RESPIRATION RATE: 18 BRPM | TEMPERATURE: 97.8 F | HEART RATE: 94 BPM | SYSTOLIC BLOOD PRESSURE: 137 MMHG | DIASTOLIC BLOOD PRESSURE: 96 MMHG

## 2024-07-12 DIAGNOSIS — O42.90 AMNIOTIC FLUID LEAKING: Primary | ICD-10-CM

## 2024-07-12 PROBLEM — Z3A.40 40 WEEKS GESTATION OF PREGNANCY: Status: ACTIVE | Noted: 2024-04-29

## 2024-07-12 LAB
ABO GROUP BLD: NORMAL
BASOPHILS # BLD AUTO: 0.02 THOUSANDS/ÂΜL (ref 0–0.1)
BASOPHILS NFR BLD AUTO: 0 % (ref 0–1)
BLD GP AB SCN SERPL QL: NEGATIVE
EOSINOPHIL # BLD AUTO: 0.01 THOUSAND/ÂΜL (ref 0–0.61)
EOSINOPHIL NFR BLD AUTO: 0 % (ref 0–6)
ERYTHROCYTE [DISTWIDTH] IN BLOOD BY AUTOMATED COUNT: 17 % (ref 11.6–15.1)
HCT VFR BLD AUTO: 34.4 % (ref 34.8–46.1)
HGB BLD-MCNC: 11.5 G/DL (ref 11.5–15.4)
HOLD SPECIMEN: YES
IMM GRANULOCYTES # BLD AUTO: 0.02 THOUSAND/UL (ref 0–0.2)
IMM GRANULOCYTES NFR BLD AUTO: 0 % (ref 0–2)
LYMPHOCYTES # BLD AUTO: 1.59 THOUSANDS/ÂΜL (ref 0.6–4.47)
LYMPHOCYTES NFR BLD AUTO: 18 % (ref 14–44)
MCH RBC QN AUTO: 29 PG (ref 26.8–34.3)
MCHC RBC AUTO-ENTMCNC: 33.4 G/DL (ref 31.4–37.4)
MCV RBC AUTO: 87 FL (ref 82–98)
MONOCYTES # BLD AUTO: 0.32 THOUSAND/ÂΜL (ref 0.17–1.22)
MONOCYTES NFR BLD AUTO: 4 % (ref 4–12)
NEUTROPHILS # BLD AUTO: 7.05 THOUSANDS/ÂΜL (ref 1.85–7.62)
NEUTS SEG NFR BLD AUTO: 78 % (ref 43–75)
NRBC BLD AUTO-RTO: 0 /100 WBCS
PLATELET # BLD AUTO: 343 THOUSANDS/UL (ref 149–390)
PMV BLD AUTO: 9 FL (ref 8.9–12.7)
RBC # BLD AUTO: 3.96 MILLION/UL (ref 3.81–5.12)
RH BLD: POSITIVE
SPECIMEN EXPIRATION DATE: NORMAL
WBC # BLD AUTO: 9.01 THOUSAND/UL (ref 4.31–10.16)

## 2024-07-12 PROCEDURE — 86850 RBC ANTIBODY SCREEN: CPT | Performed by: OBSTETRICS & GYNECOLOGY

## 2024-07-12 PROCEDURE — NC001 PR NO CHARGE: Performed by: OBSTETRICS & GYNECOLOGY

## 2024-07-12 PROCEDURE — 80053 COMPREHEN METABOLIC PANEL: CPT

## 2024-07-12 PROCEDURE — 99213 OFFICE O/P EST LOW 20 MIN: CPT

## 2024-07-12 PROCEDURE — 76815 OB US LIMITED FETUS(S): CPT

## 2024-07-12 PROCEDURE — 86900 BLOOD TYPING SEROLOGIC ABO: CPT | Performed by: OBSTETRICS & GYNECOLOGY

## 2024-07-12 PROCEDURE — 86780 TREPONEMA PALLIDUM: CPT | Performed by: OBSTETRICS & GYNECOLOGY

## 2024-07-12 PROCEDURE — 86901 BLOOD TYPING SEROLOGIC RH(D): CPT | Performed by: OBSTETRICS & GYNECOLOGY

## 2024-07-12 PROCEDURE — 59025 FETAL NON-STRESS TEST: CPT

## 2024-07-12 PROCEDURE — 85025 COMPLETE CBC W/AUTO DIFF WBC: CPT | Performed by: OBSTETRICS & GYNECOLOGY

## 2024-07-12 RX ORDER — ONDANSETRON 2 MG/ML
4 INJECTION INTRAMUSCULAR; INTRAVENOUS EVERY 6 HOURS PRN
Status: DISCONTINUED | OUTPATIENT
Start: 2024-07-12 | End: 2024-07-13 | Stop reason: SDUPTHER

## 2024-07-12 RX ORDER — BUTORPHANOL TARTRATE 1 MG/ML
1 INJECTION, SOLUTION INTRAMUSCULAR; INTRAVENOUS
Status: DISCONTINUED | OUTPATIENT
Start: 2024-07-12 | End: 2024-07-14 | Stop reason: HOSPADM

## 2024-07-12 RX ORDER — BUPIVACAINE HYDROCHLORIDE 2.5 MG/ML
30 INJECTION, SOLUTION EPIDURAL; INFILTRATION; INTRACAUDAL ONCE AS NEEDED
Status: DISCONTINUED | OUTPATIENT
Start: 2024-07-12 | End: 2024-07-14 | Stop reason: HOSPADM

## 2024-07-12 RX ORDER — PROMETHAZINE HYDROCHLORIDE 25 MG/ML
25 INJECTION, SOLUTION INTRAMUSCULAR; INTRAVENOUS EVERY 6 HOURS PRN
Status: DISCONTINUED | OUTPATIENT
Start: 2024-07-12 | End: 2024-07-14 | Stop reason: HOSPADM

## 2024-07-12 RX ORDER — OXYTOCIN/RINGER'S LACTATE 30/500 ML
1-30 PLASTIC BAG, INJECTION (ML) INTRAVENOUS
Status: DISCONTINUED | OUTPATIENT
Start: 2024-07-12 | End: 2024-07-14 | Stop reason: HOSPADM

## 2024-07-12 RX ORDER — SODIUM CHLORIDE, SODIUM LACTATE, POTASSIUM CHLORIDE, CALCIUM CHLORIDE 600; 310; 30; 20 MG/100ML; MG/100ML; MG/100ML; MG/100ML
125 INJECTION, SOLUTION INTRAVENOUS CONTINUOUS
Status: DISCONTINUED | OUTPATIENT
Start: 2024-07-12 | End: 2024-07-14 | Stop reason: HOSPADM

## 2024-07-12 NOTE — TELEPHONE ENCOUNTER
"40 weeks 2 days MATT 7/10/24  Patient states she just was in triage for LOF.  She just experienced a large gush of fluid @ 3pm - clear fluid all over legs and floor. Feeling baby moving, denies any contractions.   Denies any bleeding, no vaginal discharge. Denies contractions, some back ache.     [facility] Stephanie Garduno- return to L&D  [facility] AL Charge RN  Phone call to patient and she will return to L&D for eval.         Reason for Disposition   Leakage of fluid from vagina and leakage started < 4 hours ago    Answer Assessment - Initial Assessment Questions  1. ONSET: \"When did the symptoms begin?\"         3pm   2. CONTRACTIONS: \"Describe the contractions that you are having.\" (e.g., duration, frequency, regularity, severity)      denies  3. MATT: \"What date are you expecting to deliver?\"      7/10/24   4. PARITY: \"Have you had a baby before?\" If Yes, ask: \"How long did the labor last?\"      Yes - last labor 38 hours   5. FETAL MOVEMENT: \"Has the baby's movement decreased or changed significantly from normal?\"      Yes   6. OTHER SYMPTOMS: \"Do you have any other symptoms?\" (e.g., leaking fluid from vagina, vaginal bleeding, fever, hand/facial swelling)      Denies    Protocols used: Pregnancy - Labor-ADULT-[place]    "

## 2024-07-12 NOTE — PROCEDURES
Edwige Zo Jessica, a  at 40w2d with an MATT of 7/10/2024, by Ultrasound, was seen at [facility] LABOR AND DELIVERY for the following procedure(s): $Procedure Type: JOSE ALBERTO]    Nonstress Test  Reason for NST: Other (Comment) (Fetal leakage)  Variability: Moderate  Decelerations: None  Accelerations: Yes  Baseline: 140 BPM  Contractions: Not present    4 Quadrant JOSE ALBERTO  JOSE ALBERTO Q1 (cm): 4.4 cm  JOSE ALBERTO Q2 (cm): 3.1 cm  JOSE ALBERTO Q3 (cm): 3 cm  JOSE ALBERTO Q4 (cm): 1.9 cm  JOSE ALBERTO TOTAL (cm): 12.4 cm              Interpretation  Nonstress Test Interpretation: Reactive  Overall Impression: Reassuring  Comments: Reviewed with Dr Lucero

## 2024-07-12 NOTE — PROGRESS NOTES
L&D Triage Note - OB/GYN  Edwige Jessica 24 y.o. female MRN: [id number]  Unit/Bed#:  307-01 Encounter: [id number]    ASSESSMENT:  Edwige Jessica is a 24 y.o.  at 40w2d with a complaint of leaking of fluid around 1245 this afternoon.    PLAN:   - Continue routine prenatal care   - Case discussed with Dr. Garduno      #Discharge instructions  Patient instructed to call if experiencing worsening contractions, vaginal bleeding, loss of fluid or decreased fetal movement.  Will follow up with induction scheduled for next week      Марина Garcia MD  OBGYN PGY-1  2024 2:03 PM    SUBJECTIVE  Edwige Jessica 24 y.o.  at 40w2d with an Estimated Date of Delivery: 7/10/24  with c/o leaking fluid.   Patient no vaginal bleeding, contractions. Says that she had leaking of fluid at 1245 after she went to the bathroom.  Patient no headache, blurry vision, epigastric pain, or edema  Fetal movement good    Her obstetrical history is significant for short interval pregnancy, history of gHTN    ROS:  General: No fevers, chills or night sweats  Cardiovascular: No chest pain, or palpitations  HEENT: No double vision or blurry vision   Denies cough, shortness of breath or dyspnea on exertion  GI: No diarrhea, No blood in stools or black stools  : NO dysuria, hematuria, or pyuria    OBJECTIVE  Vitals: VSS  Vitals:    24 1326   BP: 142/84   Resp: 18   Temp: 97.8 °F (36.6 °C)     There is no height or weight on file to calculate BMI.  GBS: Negative  Blood type: O pos  Estimated Date of Delivery: 7/10/24    General Physical Exam:  General: in no apparent distress, well developed and well nourished, non-toxic, and alert  Cardiovascular: intact distals pulses  Lungs: non-labored breathing  Abdomen: Soft, non tender  Uterus :gravid, non tender  Lower extremities: nontender, b/l Varsha's sign negative    Speculum:   SVE: 3 / 70% / -2      Fetal monitoring:  FHT:  140 bpm/ Moderate 6 - 25 bpm / 15 x 15,  accelerations present, no decelerations  [place]: contractions none     KOH/WTMT:   Membrane status   - Neg ferning   - Neg  nitrazine   - Neg  pooling     Imaging:          Abd. US   JOSE ALBERTO      - Q1 4.37cm     - Q2 3.14cm     - Q3 3.02cm     - Q4 1.91cm     - Total: 12.44cm   Presentation: vtx    Labs: No results found for this or any previous visit (from the past 24 hour(s)).    Lab, Imaging and other studies: I have personally reviewed pertinent reports.

## 2024-07-13 ENCOUNTER — ANESTHESIA EVENT (INPATIENT)
Dept: ANESTHESIOLOGY | Facility: HOSPITAL | Age: 24
End: 2024-07-13
Payer: COMMERCIAL

## 2024-07-13 ENCOUNTER — ANESTHESIA (INPATIENT)
Dept: ANESTHESIOLOGY | Facility: HOSPITAL | Age: 24
End: 2024-07-13
Payer: COMMERCIAL

## 2024-07-13 LAB
ALBUMIN SERPL BCG-MCNC: 3.6 G/DL (ref 3.5–5)
ALP SERPL-CCNC: 115 U/L (ref 34–104)
ALT SERPL W P-5'-P-CCNC: 9 U/L (ref 7–52)
ANION GAP SERPL CALCULATED.3IONS-SCNC: 11 MMOL/L (ref 4–13)
AST SERPL W P-5'-P-CCNC: 16 U/L (ref 13–39)
BASE EXCESS BLDCOA CALC-SCNC: -4.8 MMOL/L (ref 3–11)
BASE EXCESS BLDCOV CALC-SCNC: -3.1 MMOL/L (ref 1–9)
BILIRUB SERPL-MCNC: 0.41 MG/DL (ref 0.2–1)
BUN SERPL-MCNC: 5 MG/DL (ref 5–25)
CALCIUM SERPL-MCNC: 9 MG/DL (ref 8.4–10.2)
CHLORIDE SERPL-SCNC: 105 MMOL/L (ref 96–108)
CO2 SERPL-SCNC: 19 MMOL/L (ref 21–32)
CREAT SERPL-MCNC: 0.55 MG/DL (ref 0.6–1.3)
CREAT UR-MCNC: 34.6 MG/DL
GFR SERPL CREATININE-BSD FRML MDRD: 130 ML/MIN/1.73SQ M
GLUCOSE SERPL-MCNC: 135 MG/DL (ref 65–140)
HCO3 BLDCOA-SCNC: 21.3 MMOL/L (ref 17.3–27.3)
HCO3 BLDCOV-SCNC: 21 MMOL/L (ref 12.2–28.6)
O2 CT VFR BLDCOA CALC: 17.1 ML/DL
OXYHGB MFR BLDCOA: 64.5 %
OXYHGB MFR BLDCOV: 77.5 %
PCO2 BLDCOA: 43.1 MM[HG] (ref 30–60)
PCO2 BLDCOV: 35.8 MM HG (ref 27–43)
PH BLDCOA: 7.31 [PH] (ref 7.23–7.43)
PH BLDCOV: 7.39 [PH] (ref 7.19–7.49)
PO2 BLDCOA: 27.7 MM HG (ref 5–25)
PO2 BLDCOV: 33.2 MM HG (ref 15–45)
POTASSIUM SERPL-SCNC: 3.3 MMOL/L (ref 3.5–5.3)
PROT SERPL-MCNC: 6.5 G/DL (ref 6.4–8.4)
PROT UR-MCNC: <4 MG/DL
SAO2 % BLDCOV: 19.5 ML/DL
SODIUM SERPL-SCNC: 135 MMOL/L (ref 135–147)
TREPONEMA PALLIDUM IGG+IGM AB [PRESENCE] IN SERUM OR PLASMA BY IMMUNOASSAY: NORMAL

## 2024-07-13 PROCEDURE — 82570 ASSAY OF URINE CREATININE: CPT

## 2024-07-13 PROCEDURE — 84156 ASSAY OF PROTEIN URINE: CPT

## 2024-07-13 PROCEDURE — NC001 PR NO CHARGE: Performed by: OBSTETRICS & GYNECOLOGY

## 2024-07-13 PROCEDURE — 4A1HXCZ MONITORING OF PRODUCTS OF CONCEPTION, CARDIAC RATE, EXTERNAL APPROACH: ICD-10-PCS | Performed by: OBSTETRICS & GYNECOLOGY

## 2024-07-13 PROCEDURE — 82805 BLOOD GASES W/O2 SATURATION: CPT | Performed by: OBSTETRICS & GYNECOLOGY

## 2024-07-13 PROCEDURE — 10H07YZ INSERTION OF OTHER DEVICE INTO PRODUCTS OF CONCEPTION, VIA NATURAL OR ARTIFICIAL OPENING: ICD-10-PCS | Performed by: OBSTETRICS & GYNECOLOGY

## 2024-07-13 RX ORDER — SENNOSIDES 8.6 MG
1 TABLET ORAL DAILY
Status: DISCONTINUED | OUTPATIENT
Start: 2024-07-13 | End: 2024-07-14 | Stop reason: HOSPADM

## 2024-07-13 RX ORDER — DOCUSATE SODIUM 100 MG/1
100 CAPSULE, LIQUID FILLED ORAL 2 TIMES DAILY
Status: DISCONTINUED | OUTPATIENT
Start: 2024-07-13 | End: 2024-07-14 | Stop reason: HOSPADM

## 2024-07-13 RX ORDER — BENZOCAINE/MENTHOL 6 MG-10 MG
1 LOZENGE MUCOUS MEMBRANE DAILY PRN
Status: DISCONTINUED | OUTPATIENT
Start: 2024-07-13 | End: 2024-07-14 | Stop reason: HOSPADM

## 2024-07-13 RX ORDER — DIPHENHYDRAMINE HYDROCHLORIDE 50 MG/ML
12.5 INJECTION INTRAMUSCULAR; INTRAVENOUS EVERY 6 HOURS PRN
Status: DISCONTINUED | OUTPATIENT
Start: 2024-07-13 | End: 2024-07-14 | Stop reason: HOSPADM

## 2024-07-13 RX ORDER — OXYTOCIN/RINGER'S LACTATE 30/500 ML
250 PLASTIC BAG, INJECTION (ML) INTRAVENOUS CONTINUOUS
Status: ACTIVE | OUTPATIENT
Start: 2024-07-13 | End: 2024-07-13

## 2024-07-13 RX ORDER — ONDANSETRON 2 MG/ML
4 INJECTION INTRAMUSCULAR; INTRAVENOUS EVERY 8 HOURS PRN
Status: DISCONTINUED | OUTPATIENT
Start: 2024-07-13 | End: 2024-07-14 | Stop reason: HOSPADM

## 2024-07-13 RX ORDER — SODIUM CHLORIDE, SODIUM LACTATE, POTASSIUM CHLORIDE, CALCIUM CHLORIDE 600; 310; 30; 20 MG/100ML; MG/100ML; MG/100ML; MG/100ML
75 INJECTION, SOLUTION INTRAVENOUS CONTINUOUS
Status: DISCONTINUED | OUTPATIENT
Start: 2024-07-13 | End: 2024-07-14 | Stop reason: HOSPADM

## 2024-07-13 RX ORDER — CALCIUM CARBONATE 500 MG/1
1000 TABLET, CHEWABLE ORAL 3 TIMES DAILY PRN
Status: DISCONTINUED | OUTPATIENT
Start: 2024-07-13 | End: 2024-07-14 | Stop reason: HOSPADM

## 2024-07-13 RX ORDER — ROPIVACAINE HYDROCHLORIDE 2 MG/ML
INJECTION, SOLUTION EPIDURAL; INFILTRATION; PERINEURAL
Status: COMPLETED | OUTPATIENT
Start: 2024-07-13 | End: 2024-07-13

## 2024-07-13 RX ORDER — SIMETHICONE 80 MG
80 TABLET,CHEWABLE ORAL 4 TIMES DAILY PRN
Status: DISCONTINUED | OUTPATIENT
Start: 2024-07-13 | End: 2024-07-14 | Stop reason: HOSPADM

## 2024-07-13 RX ORDER — IBUPROFEN 600 MG/1
600 TABLET ORAL EVERY 6 HOURS SCHEDULED
Status: DISCONTINUED | OUTPATIENT
Start: 2024-07-13 | End: 2024-07-14 | Stop reason: HOSPADM

## 2024-07-13 RX ORDER — MAGNESIUM HYDROXIDE/ALUMINUM HYDROXICE/SIMETHICONE 120; 1200; 1200 MG/30ML; MG/30ML; MG/30ML
15 SUSPENSION ORAL EVERY 6 HOURS PRN
Status: DISCONTINUED | OUTPATIENT
Start: 2024-07-13 | End: 2024-07-14 | Stop reason: HOSPADM

## 2024-07-13 RX ORDER — ACETAMINOPHEN 325 MG/1
650 TABLET ORAL EVERY 6 HOURS SCHEDULED
Status: DISCONTINUED | OUTPATIENT
Start: 2024-07-13 | End: 2024-07-14 | Stop reason: HOSPADM

## 2024-07-13 RX ORDER — DIPHENHYDRAMINE HCL 25 MG
25 TABLET ORAL EVERY 6 HOURS PRN
Status: DISCONTINUED | OUTPATIENT
Start: 2024-07-13 | End: 2024-07-14 | Stop reason: HOSPADM

## 2024-07-13 RX ADMIN — BENZOCAINE AND LEVOMENTHOL 1 APPLICATION: 200; 5 SPRAY TOPICAL at 14:40

## 2024-07-13 RX ADMIN — SODIUM CHLORIDE, SODIUM LACTATE, POTASSIUM CHLORIDE, AND CALCIUM CHLORIDE 300 ML: .6; .31; .03; .02 INJECTION, SOLUTION INTRAVENOUS at 06:56

## 2024-07-13 RX ADMIN — SODIUM CHLORIDE, SODIUM LACTATE, POTASSIUM CHLORIDE, AND CALCIUM CHLORIDE 75 ML/HR: .6; .31; .03; .02 INJECTION, SOLUTION INTRAVENOUS at 09:01

## 2024-07-13 RX ADMIN — SENNOSIDES 8.6 MG: 8.6 TABLET, FILM COATED ORAL at 18:03

## 2024-07-13 RX ADMIN — ROPIVACAINE HYDROCHLORIDE: 2 INJECTION, SOLUTION EPIDURAL; INFILTRATION at 07:00

## 2024-07-13 RX ADMIN — ACETAMINOPHEN 650 MG: 325 TABLET, FILM COATED ORAL at 17:49

## 2024-07-13 RX ADMIN — IBUPROFEN 600 MG: 600 TABLET, FILM COATED ORAL at 21:15

## 2024-07-13 RX ADMIN — Medication 2 MILLI-UNITS/MIN: at 00:20

## 2024-07-13 RX ADMIN — SODIUM CHLORIDE, SODIUM LACTATE, POTASSIUM CHLORIDE, AND CALCIUM CHLORIDE 999 ML/HR: .6; .31; .03; .02 INJECTION, SOLUTION INTRAVENOUS at 08:48

## 2024-07-13 RX ADMIN — ROPIVACAINE HYDROCHLORIDE 10 ML: 2 INJECTION EPIDURAL; INFILTRATION; PERINEURAL at 06:01

## 2024-07-13 RX ADMIN — IBUPROFEN 600 MG: 600 TABLET, FILM COATED ORAL at 14:40

## 2024-07-13 RX ADMIN — DOCUSATE SODIUM 100 MG: 100 CAPSULE, LIQUID FILLED ORAL at 18:03

## 2024-07-13 RX ADMIN — SODIUM CHLORIDE, SODIUM LACTATE, POTASSIUM CHLORIDE, AND CALCIUM CHLORIDE 125 ML/HR: .6; .31; .03; .02 INJECTION, SOLUTION INTRAVENOUS at 00:21

## 2024-07-13 NOTE — OB LABOR/OXYTOCIN SAFETY PROGRESS
Oxytocin Safety Progress Check Note - Edwige Jessica 24 y.o. female MRN: [id number]    Unit/Bed#: -01 Encounter: [id number]       Contraction Frequency (minutes): 0  Contraction Intensity: Mild  Uterine Activity Characteristics: Irritability  Cervical Dilation: 4        Cervical Effacement: 80  Fetal Station: -1  Baseline Rate (FHR): 135 bpm  Fetal Heart Rate (FHT): 135 BPM  FHR Category: I               Vital Signs:   Vitals:    07/12/24 2224   BP: 134/81   Pulse: 97   Resp: 18   Temp: 97.8 °F (36.6 °C)       Notes/comments:   Patient is feeling well at this time.  SVE unchanged as above.  FHT category 1.  Plan to start Pitocin.    Christa Reynolds MD 7/13/2024 12:10 AM

## 2024-07-13 NOTE — PLAN OF CARE
Problem: BIRTH - VAGINAL/ SECTION  Goal: Fetal and maternal status remain reassuring during the birth process  Description: INTERVENTIONS:  - Monitor vital signs  - Monitor fetal heart rate  - Monitor uterine activity  - Monitor labor progression (vaginal delivery)  - DVT prophylaxis  - Antibiotic prophylaxis  Outcome: Completed  Goal: Emotionally satisfying birthing experience for mother/fetus  Description: Interventions:  - Assess, plan, implement and evaluate the nursing care given to the patient in labor  - Advocate the philosophy that each childbirth experience is a unique experience and support the family's chosen level of involvement and control during the labor process   - Actively participate in both the patient's and family's teaching of the birth process  - Consider cultural, Latter-day and age-specific factors and plan care for the patient in labor  Outcome: Completed     Problem: Knowledge Deficit  Goal: Verbalizes understanding of labor plan  Description: Assess patient/family/caregiver's baseline knowledge level and ability to understand information.  Provide education via patient/family/caregiver's preferred learning method at appropriate level of understanding.     1. Provide teaching at level of understanding.  2. Provide teaching via preferred learning method(s).  Outcome: Completed     Problem: Labor & Delivery  Goal: Manages discomfort  Description: Assess and monitor for signs and symptoms of discomfort.  Assess patient's pain level regularly and per hospital policy.  Administer medications as ordered. Support use of nonpharmacological methods to help control pain such as distraction, imagery, relaxation, and application of heat and cold.  Collaborate with interdisciplinary team and patient to determine appropriate pain management plan.    1. Include patient in decisions related to comfort.  2. Offer non-pharmacological pain management interventions.  3. Report ineffective pain  management to physician.  Outcome: Completed  Goal: Patient vital signs are stable  Description: 1. Assess vital signs - vaginal delivery.  Outcome: Completed

## 2024-07-13 NOTE — PLAN OF CARE
Problem: BIRTH - VAGINAL/ SECTION  Goal: Fetal and maternal status remain reassuring during the birth process  Description: INTERVENTIONS:  - Monitor vital signs  - Monitor fetal heart rate  - Monitor uterine activity  - Monitor labor progression (vaginal delivery)  - DVT prophylaxis  - Antibiotic prophylaxis  Outcome: Progressing  Goal: Emotionally satisfying birthing experience for mother/fetus  Description: Interventions:  - Assess, plan, implement and evaluate the nursing care given to the patient in labor  - Advocate the philosophy that each childbirth experience is a unique experience and support the family's chosen level of involvement and control during the labor process   - Actively participate in both the patient's and family's teaching of the birth process  - Consider cultural, [employer] and age-specific factors and plan care for the patient in labor  Outcome: Progressing     Problem: Knowledge Deficit  Goal: Verbalizes understanding of labor plan  Description: Assess patient/family/caregiver's baseline knowledge level and ability to understand information.  Provide education via patient/family/caregiver's preferred learning method at appropriate level of understanding.     1. Provide teaching at level of understanding.  2. Provide teaching via preferred learning method(s).  Outcome: Progressing     Problem: Labor & Delivery  Goal: Manages discomfort  Description: Assess and monitor for signs and symptoms of discomfort.  Assess patient's pain level regularly and per hospital policy.  Administer medications as ordered. Support use of nonpharmacological methods to help control pain such as distraction, imagery, relaxation, and application of heat and cold.  Collaborate with interdisciplinary team and patient to determine appropriate pain management plan.    1. Include patient in decisions related to comfort.  2. Offer non-pharmacological pain management interventions.  3. Report ineffective pain  management to physician.  Outcome: Progressing  Goal: Patient vital signs are stable  Description: 1. Assess vital signs - vaginal delivery.  Outcome: Progressing

## 2024-07-13 NOTE — OB LABOR/OXYTOCIN SAFETY PROGRESS
Oxytocin Safety Progress Check Note - Edwige Jessica 24 y.o. female MRN: [id number]    Unit/Bed#: -01 Encounter: [id number]    Dose (luan-units/min) Oxytocin: 6 luan-units/min  Contraction Frequency (minutes): 3  Contraction Intensity: Mild  Uterine Activity Characteristics: Irregular  Cervical Dilation: 5        Cervical Effacement: 80  Fetal Station: -1  Baseline Rate (FHR): 130 bpm  Fetal Heart Rate (FHT): 130 BPM  FHR Category: 2               Vital Signs:   Vitals:    07/13/24 0600   BP: 134/83   Pulse: 77   Resp:    Temp:      Patient rechecked due to recurrent variable decelerations. SVE unchanged, as above. IUPC placed and plan to start amnioinfusion.    Lucero Batista MD 7/13/2024 6:34 AM

## 2024-07-13 NOTE — DISCHARGE SUMMARY
Discharge Summary - OB/GYN   Edwige Jessica 24 y.o. female MRN: [id number]  Unit/Bed#: -01 Encounter: [id number]      Admission Date: 2024     Discharge Date: 24    Admitting Diagnosis:   Patient Active Problem List   Diagnosis    Obesity during pregnancy, antepartum, third trimester    History of gestational hypertension    Short interval between pregnancies affecting pregnancy, antepartum    Obesity in pregnancy     (spontaneous vaginal delivery)    Amniotic fluid leaking        Discharge Diagnosis:   Same, delivered    Procedures: spontaneous vaginal delivery    Delivery Attending: Flor Dick DO  Discharge Attending: Flor Dick DO    [facility] Course:   Edwige Jessica is a 24 y.o. now  at 40w3d wks who was initially admitted for premature rupture of membranes. Her pregnancy was complicated by a history of gestational hypertension in a prior pregnancy. On admission, she was noted to be grossly ruptured and SVE was 4/80/-1. She remained unchanged and pitocin was started. She received an epidural. FHT was significant for recurrent variable decelerations and and IUPC and FSE were placed. Amnioinfusion was started.  She was repositioned multiple times in attempts to improve FHT and promote fetal descent, as baby was felt to be ROP on exam. She met criteria for gestational hypertension in labor; labs and urine P:C ratio were within normal limits. She progressed to complete and began pushing.      She delivered a viable female  on 24 at 1045. Weight 7lbs 2.6oz via spontaneous vaginal delivery. Apgars were 8 (1 min) and 9 (5 min).  was transferred to  nursery. Patient tolerated the procedure well and was transferred to recovery in stable condition.     Her postpartum course was uncomplicated. Her postpartum pain was well controlled with oral analgesics.    On day of discharge, she was ambulating and able to reasonably perform all ADLs. She  was voiding and had appropriate bowel function. Pain was well controlled. She was discharged home on post-partum day #1 without complications. Patient was instructed to follow up with her OB as an outpatient and was given appropriate warnings to call provider if she develops signs of infection or uncontrolled pain.    Complications: none apparent    Condition at discharge: good     Discharge instructions/Information to patient and family:   - Do not place anything (no partner, tampons or douche) in your vagina for 6 weeks.  -You may walk for exercise for the first 6 weeks then gradually return to your usual activities.   -Please do not drive for 1 week if you have no stitches and for 2 weeks if you have stitches or underwent a  delivery.    -You may take baths or shower per your preference.   -Please look at your bust (breasts) in the mirror daily and call for redness or tenderness or increased warmth.   -Please call us for temperature > 100.4*F or 38* C, worsening pain or a foul discharge.      Discharge Medications:   Prenatal vitamin daily for 6 months or the duration of nursing whichever is longer.  Motrin 600 mg orally every 6 hours as needed for pain  Tylenol (over the counter) per bottle directions as needed for pain: do NOT use with percocet  Hydrocortisone cream 1% (over the counter) applied 1-2x daily to hemorrhoids as needed    Planned Readmission: No      Lucero Batista MD  PGY-2 OBGYN    Attending/Teaching Physician Statement  I have participated in the care of this patient during this hospitalization and agree with the discharge summary.    Flor Dick,   24  8:00 AM

## 2024-07-13 NOTE — ASSESSMENT & PLAN NOTE
-Admit to L&D  -SROM v PROM, recheck in 2 hours  -consider pitocin if no cervical change  -GBS negative, PCN not indicated

## 2024-07-13 NOTE — L&D DELIVERY NOTE
Vaginal Delivery Summary - OB/GYN   Edwige Jessica 24 y.o. female MRN: [id number]  Unit/Bed#: -01 Encounter: [id number]    Pre-delivery Diagnosis:   1. Pregnancy at 40 weeks   2. Gestational hypertension    Post-delivery Diagnosis: same, delivered    Procedure: Spontaneous Vaginal Delivery    Attending: Dr. Dick    Assistant(s): Dr. Batista    Anesthesia: Epidural    QBL: 300 cc    Complications: none apparent    Specimens:   1. Arterial and venous cord gases  2. Cord blood  3. Segment of umbilical cord  4. Placenta to storage     Findings:  1. Viable female on 2024 at 1045, with APGARS of 8 and 9 at 1 and 5 minutes respectively,  2. Spontaneous delivery of intact placenta at 1049  3. No lacerations   4. Umbilical Cord Venous Blood Gas:  Results from last 7 days   Lab Units 24  1046   PH COV  7.387   PCO2 COV mm HG 35.8   HCO3 COV mmol/L 21.0   BASE EXC COV mmol/L -3.1*   O2 CT CD VB mL/dL 19.5   O2 HGB, VENOUS CORD % 77.5     5. Umbilical Cord Arterial Blood Gas:          Disposition:  Patient tolerated the procedure well and was recovering in labor and delivery room.     Brief history and labor course:  Edwige Jessica is a 24 y.o.  at 40w3d wks who was initially admitted for premature rupture of membranes. Her pregnancy was complicated by a history of gestational hypertension in a prior pregnancy. On admission, she was noted to be grossly ruptured and SVE was 4/80/-1. She remained unchanged and pitocin was started. She received an epidural. FHT was significant for recurrent variable decelerations and and IUPC and FSE were placed. Amnioinfusion was started.  She was repositioned multiple times in attempts to improve FHT and promote fetal descent, as baby was felt to be ROP on exam. She met criteria for gestational hypertension in labor; labs and urine P:C ratio were within normal limits. She progressed to complete and began pushing.      Description of procedure:  After  pushing for 4 minutes, at 1045 patient delivered a viable female , wt pending, apgars of 8 (1 min) and 9 (5 min). The fetal vertex delivered spontaneously. There was no nuchal cord. Baby was OA, restituted to MAYELIN. The left anterior shoulder delivered atraumatically with maternal expulsive forces and the assistance of gentle downward traction. The right posterior shoulder delivered with maternal expulsive forces and the assistance of gentle upward traction. The remainder of the fetus delivered spontaneously.     Upon delivery, the infant was placed on the mothers abdomen and the cord was clamped and cut. The infant was noted to cry spontaneously and was moving all extremities appropriately. There was no evidence for injury. Awaiting nurse resuscitators evaluated the . Arterial and venous cord blood gases and cord blood was collected for analysis. These were promptly sent to the lab. In the immediate post-partum, 30 units of IV pitocin was administered, and the uterus was noted to contract down well with massage and pitocin. The placenta delivered spontaneously at 1049 and was noted to have a centrally inserted 3 vessel cord.     The vagina, cervix, perineum, and rectum were inspected and there was noted to be no lacerations.    At the conclusion of the procedure, all needle, sponge, and instrument counts were noted to be correct. Patient tolerated the procedure well and was allowed to recover in labor and delivery room with family and  before being transferred to the post-partum floor. Dr. Dick was present and participated in all key portions of the case.    Lucero Batista MD  OB/GYN PGY-2  2024  11:04 AM

## 2024-07-13 NOTE — OB LABOR/OXYTOCIN SAFETY PROGRESS
Oxytocin Safety Progress Check Note - Edwige Jessica 24 y.o. female MRN: [id number]    Unit/Bed#: -01 Encounter: [id number]    Dose (luan-units/min) Oxytocin: 6 luan-units/min  Contraction Frequency (minutes): 2-4  Contraction Intensity: Moderate  Uterine Activity Characteristics: Irregular  Cervical Dilation: 7        Cervical Effacement: 80  Fetal Station: -1  Baseline Rate (FHR): 120 bpm  Fetal Heart Rate (FHT): 130 BPM  FHR Category: 2               Vital Signs:   Vitals:    07/13/24 0900   BP: 117/61   Pulse:    Resp:    Temp:      Rechecked due to deep variable decelerations. SVE with good change, as above, and FHT overall with moderate variability, recovers between decelerations. Plan to continue repositioning and continue amnioinfusion.     Lucero Batista MD 7/13/2024 10:13 AM

## 2024-07-13 NOTE — PROGRESS NOTES
History and Physical - Obstetrics  Edwige Jessica 24 y.o. female MRN: [id number]  Unit/Bed#: LD TRIAGE 3- Encounter: [id number]    ObGyn Provider:  [facility]    Assessment/Plan:    24 y.o.  at 40w2d admitted for spontaneous rupture of membranes vs PROM; patient was seen earlier on L&D for APFS. She arrived home and noted a large gush of clear fluid at 1500. She continued to leak fluid. Reports a small ammount   SVE: Cervical Dilation: 4  Cervical Effacement: 80  Cervical Consistency: Medium  Fetal Station: -1  Position: Unknown    * Amniotic fluid leaking  Assessment & Plan  -Admit to L&D  -SROM v PROM, recheck in 2 hours  -consider pitocin if no cervical change  -GBS negative, PCN not indicated             This patient will be an INPATIENT  and I certify the anticipated length of stay is >2 Midnights      SUBJECTIVE:  Chief Complaint:  Leaking of fluid    History of Present Illness:  Edwige Jessica is a 24 y.o.  female at 40w2d, MATT 7/10/2024, by Ultrasound, Hospital Day: 1, who presents with a large gush of fluid that occurred approximately 3 PM.  She reports the fluid was clear with a small specks of blood.  She was in earlier for her antepartum fetal surveillance for NST and JOSE ALBERTO secondary to obesity.  She was checked earlier as well.  She denies contractions and reports good fetal movement.    Review of Systems   Constitutional: Negative.    HENT: Negative.     Eyes: Negative.    Respiratory: Negative.     Cardiovascular: Negative.    Gastrointestinal: Negative.    Genitourinary: Negative.    Musculoskeletal: Negative.    All other systems reviewed and are negative.      Current Pregnancy Problems:  Problem   Amniotic Fluid Leaking   40 Weeks Gestation of Pregnancy    Unsure of her last menstrual period  dating scan completed at 27 weeks  29-week M scan showed an AC in the 5th percentile  32-week ultrasound shows a fetus that is symmetric and concordant  with her 27-week scan  Recommended a 35-week growth scan     Short Interval Between Pregnancies Affecting Pregnancy, Antepartum   Obesity in Pregnancy   History of Gestational Hypertension   Obesity During Pregnancy, Antepartum, Third Trimester       Past Obstetric History:   No LMP recorded (lmp unknown). Patient is pregnant.   OB History    Para Term  AB Living   2 1 1 0 0 1   SAB IAB Ectopic Multiple Live Births   0 0 0 0 1      # Outcome Date GA Lbr Cristiano/2nd Weight Sex Type Anes PTL Lv   2 Current            1 Term 23 39w5d / 00:06 3175 g (7 lb) F Vag-Spont EPI N MOLLY      Name: ADRIANABABY GIRL ([facility])      Apgar1: 8  Apgar5: 9       Pregnancy Plan:  Pregnancy: Roger  Fetal sex: Female  Support person: Ange Hsu     Delivery Plans  Planned delivery method: Vaginal  Planned delivery location: [facility] L&D  Planned anesthesia: Epidural  Acceptable blood products: All     Post-Delivery Plans  Feeding intentions: Breast Milk    HISTORICAL INFORMATION:  Past Medical History:   Diagnosis Date    Varicella      Past Surgical History:   Procedure Laterality Date    EYE SURGERY       Social History   Alcohol use: none  Tobacco use: none  Other substance use:     Other:     Family History   Problem Relation Age of Onset    Hypertension Mother     Heart disease Father     No Known Problems Brother     No Known Problems Maternal Grandmother     Cancer Maternal Grandfather     Stroke Maternal Grandfather     Stroke Paternal Grandmother     Breast cancer Paternal Grandmother     Lung cancer Paternal Grandfather     No Known Problems Half-Sister     No Known Problems Half-Sister     Breast cancer Maternal Aunt     Colon cancer Neg Hx     Ovarian cancer Neg Hx        Allergies:   No Known Allergies    Current Medications:  Current Outpatient Medications   Medication Instructions    Prenatal Vit-Fe Fumarate-FA (PRENATAL VITAMINS PO) Oral       OBJECTIVE:  Vitals:  No data found.  There is no height  or weight on file to calculate BMI.    Invasive Devices       None                   Physical Exam  Vitals and nursing note reviewed.   Constitutional:       General: She is not in acute distress.     Appearance: She is well-developed.   HENT:      Head: Normocephalic and atraumatic.   Eyes:      Conjunctiva/sclera: Conjunctivae normal.   Cardiovascular:      Rate and Rhythm: Normal rate and regular rhythm.      Heart sounds: No murmur heard.  Pulmonary:      Effort: Pulmonary effort is normal. No respiratory distress.      Breath sounds: Normal breath sounds.   Abdominal:      General: Distension: gravid.      Palpations: Abdomen is soft.      Tenderness: There is no abdominal tenderness.   Genitourinary:     General: Normal vulva.      Vagina: Vaginal discharge: leaking of fluid, grossly ruptured.   Musculoskeletal:         General: No swelling.      Cervical back: Neck supple.   Skin:     General: Skin is warm and dry.      Capillary Refill: Capillary refill takes less than 2 seconds.   Neurological:      Mental Status: She is alert.   Psychiatric:         Mood and Affect: Mood normal.         Cervical Exam:    Cervical Dilation: 4  Cervical Effacement: 80  Cervical Consistency: Medium  Fetal Station: -1  Position: Unknown    Estimated fetal weight: 7lbs  Presentation: vertex, confirmed by palpation    Membranes: ruptured  Placenta: posterior    Fetal Heart Tracing:  FHT:   Baseline Rate (FHR): 140 bpm  FHR Category: Category I    [place]:  Contraction Frequency (minutes): 0    Prenatal Labs:  Lab Results   Component Value Date/Time    ABO O 04/19/2024 03:59 PM    RH Positive 04/19/2024 03:59 PM    HGB 12.1 04/19/2024 03:59 PM    HCT 36.6 04/19/2024 03:59 PM     (H) 04/19/2024 03:59 PM    HEPBSAG Non-reactive 04/19/2024 03:59 PM    RUBELLAIGGQT 15.0 04/19/2024 03:59 PM    BMR9NYJI17FO 83 04/19/2024 03:59 PM    [id number] 122 05/02/2023 01:04 PM    STREPGRPB Negative 06/19/2024 10:01 AM      HIV: negative    Hepatitis B: negative  RPR: negative  Rubella: immune  Varicella: unknown  Gonorrhea/Chlamydia:   1 hour Glucose: 83  3 hour Glucose: n/a  Group B strep: negative    Other pertinent admission labs:      Imaging, EKG, Pathology, and Other Studies: I have personally reviewed pertinent reports.        Stephanie Garduno MD  7/12/2024  8:50 PM

## 2024-07-13 NOTE — ANESTHESIA PROCEDURE NOTES
Epidural Block    Patient location during procedure: OB/L&D  Start time: 7/13/2024 5:50 AM  Reason for block: at surgeon's request  Staffing  Performed by: Arthur Beebe DO  Authorized by: Arthur Beebe DO    Preanesthetic Checklist  Completed: patient identified, IV checked, site marked, risks and benefits discussed, surgical consent, monitors and equipment checked, pre-op evaluation and timeout performed  Epidural  Patient position: sitting  Prep: Betadine  Sedation Level: no sedation  Patient monitoring: cardiac monitor and heart rate  Approach: midline  Location: lumbar, L3-4  Injection technique: NISHANT air  Needle  Needle type: Tuohy   Needle gauge: 18 G  Catheter type: side hole  Catheter size: 20 G  Catheter securement method: tape  Test dose: negativeropivacaine (NAROPIN) 0.2% injection 10 mL - Epidural   10 mL - 7/13/2024 6:01:00 AM  Assessment  Sensory level: T10  Number of attempts: 1negative aspiration for CSF, negative aspiration for heme and no paresthesia on injection  patient tolerated the procedure well with no immediate complications

## 2024-07-13 NOTE — ANESTHESIA PREPROCEDURE EVALUATION
Procedure:  LABOR ANALGESIA    Relevant Problems   GYN   (+) 40 weeks gestation of pregnancy        Physical Exam    Airway    Mallampati score: II         Dental       Cardiovascular  Rhythm: regular, Rate: normal    Pulmonary   Breath sounds clear to auscultation    Other Findings  post-pubertal.      Anesthesia Plan  ASA Score- 2     Anesthesia Type- epidural with ASA Monitors.         Additional Monitors:     Airway Plan:            Plan Factors-Exercise tolerance (METS): >4 METS.    Chart reviewed.   Existing labs reviewed. Patient summary reviewed.    Patient is not a current smoker.              Induction- intravenous.    Postoperative Plan-     Perioperative Resuscitation Plan - Level 1 - Full Code.       Informed Consent- Anesthetic plan and risks discussed with patient.

## 2024-07-13 NOTE — OB LABOR/OXYTOCIN SAFETY PROGRESS
Oxytocin Safety Progress Check Note - Edwige Jessica 24 y.o. female MRN: [id number]    Unit/Bed#: -01 Encounter: [id number]    Dose (luan-units/min) Oxytocin: 6 luan-units/min  Contraction Frequency (minutes): 2 - 4  Contraction Intensity: Moderate  Uterine Activity Characteristics: Irregular  Cervical Dilation: 5-6        Cervical Effacement: 80  Fetal Station: -1  Baseline Rate (FHR): 120 bpm  Fetal Heart Rate (FHT): 130 BPM  FHR Category: 2               Vital Signs:   Vitals:    07/13/24 0840   BP: 112/58   Pulse:    Resp:    Temp:      FHT noted to be discontinuous; patient laying flat for Alanis catheter placement. SVE at this time with some change, as above. Fetal position thought to be ROP, plan to reposition appropriately on peanut ball. FSE placed due to discontinuous monitoring. Continue amnioinfusion. D/w Dr. Dick.    Lucero Batista MD 7/13/2024 9:20 AM

## 2024-07-13 NOTE — OB LABOR/OXYTOCIN SAFETY PROGRESS
Oxytocin Safety Progress Check Note - Edwige Jessica 24 y.o. female MRN: [id number]    Unit/Bed#: -01 Encounter: [id number]    Dose (luan-units/min) Oxytocin: 6 luan-units/min  Contraction Frequency (minutes): 2-4  Contraction Intensity: Moderate  Uterine Activity Characteristics: Irregular  Cervical Dilation: 10  Dilation Complete Date: 07/13/24  Dilation Complete Time: 1037  Cervical Effacement: 80  Fetal Station: -1  Baseline Rate (FHR): 120 bpm  Fetal Heart Rate (FHT): 130 BPM  FHR Category: 2               Vital Signs:   Vitals:    07/13/24 1013   BP: 136/66   Pulse:    Resp:    Temp:      Patient rechecked due to feeling increased pressure. SVE now complete, station between +1 and +2. Plan to start pushing. Dr. Dick at bedside.  Lucero Batista MD 7/13/2024 10:38 AM

## 2024-07-13 NOTE — OB LABOR/OXYTOCIN SAFETY PROGRESS
Oxytocin Safety Progress Check Note - Edwige Jessica 24 y.o. female MRN: [id number]    Unit/Bed#: -01 Encounter: [id number]    Dose (luan-units/min) Oxytocin: 6 luan-units/min  Contraction Frequency (minutes): 3-4  Contraction Intensity: Mild  Uterine Activity Characteristics: Irritability  Cervical Dilation: 4        Cervical Effacement: 80  Fetal Station: -1  Baseline Rate (FHR): 130 bpm  Fetal Heart Rate (FHT): 135 BPM  FHR Category: I               Vital Signs:   Vitals:    07/13/24 0500   BP: 137/87   Pulse:    Resp:    Temp:        Notes/comments:   Patient declined SVE at this time as she would like her epidural first.  FHT has been category 1 but recently has discontinuity around contractions.  Likely early decelerations.  Possibly variable decelerations.  Plan for epidural placement, then SVE.     Christa Reynolds MD 7/13/2024 5:34 AM

## 2024-07-13 NOTE — OB LABOR/OXYTOCIN SAFETY PROGRESS
Oxytocin Safety Progress Check Note - Edwige Jessica 24 y.o. female MRN: [id number]    Unit/Bed#: -01 Encounter: [id number]    Dose (luan-units/min) Oxytocin: 6 luan-units/min  Contraction Frequency (minutes): 3-4  Contraction Intensity: Mild  Uterine Activity Characteristics: Irritability  Cervical Dilation: 5        Cervical Effacement: 80  Fetal Station: -1  Baseline Rate (FHR): 130 bpm  Fetal Heart Rate (FHT): 135 BPM  FHR Category: I               Vital Signs:   Vitals:    07/13/24 0500   BP: 137/87   Pulse:    Resp:    Temp:        Notes/comments:   Patient is getting more uncomfortable and requests epidural. SVE as above. FHT cat I previously. Recently some discontinuity around contractions that likely represents early decelerations. Variable decelerations possible. Will continue to monitor and titrate pitocin as able.       Christa Reynolds MD 7/13/2024 5:42 AM

## 2024-07-13 NOTE — PLAN OF CARE
Problem: BIRTH - VAGINAL/ SECTION  Goal: Fetal and maternal status remain reassuring during the birth process  Description: INTERVENTIONS:  - Monitor vital signs  - Monitor fetal heart rate  - Monitor uterine activity  - Monitor labor progression (vaginal delivery)  - DVT prophylaxis  - Antibiotic prophylaxis  Outcome: Completed  Goal: Emotionally satisfying birthing experience for mother/fetus  Description: Interventions:  - Assess, plan, implement and evaluate the nursing care given to the patient in labor  - Advocate the philosophy that each childbirth experience is a unique experience and support the family's chosen level of involvement and control during the labor process   - Actively participate in both the patient's and family's teaching of the birth process  - Consider cultural, Religion and age-specific factors and plan care for the patient in labor  Outcome: Completed     Problem: Knowledge Deficit  Goal: Verbalizes understanding of labor plan  Description: Assess patient/family/caregiver's baseline knowledge level and ability to understand information.  Provide education via patient/family/caregiver's preferred learning method at appropriate level of understanding.     1. Provide teaching at level of understanding.  2. Provide teaching via preferred learning method(s).  Outcome: Completed     Problem: Labor & Delivery  Goal: Manages discomfort  Description: Assess and monitor for signs and symptoms of discomfort.  Assess patient's pain level regularly and per hospital policy.  Administer medications as ordered. Support use of nonpharmacological methods to help control pain such as distraction, imagery, relaxation, and application of heat and cold.  Collaborate with interdisciplinary team and patient to determine appropriate pain management plan.    1. Include patient in decisions related to comfort.  2. Offer non-pharmacological pain management interventions.  3. Report ineffective pain  management to physician.  Outcome: Completed  Goal: Patient vital signs are stable  Description: 1. Assess vital signs - vaginal delivery.  Outcome: Completed

## 2024-07-13 NOTE — OB LABOR/OXYTOCIN SAFETY PROGRESS
Oxytocin Safety Progress Check Note - Edwige Jessica 24 y.o. female MRN: [id number]    Unit/Bed#: -01 Encounter: [id number]    Dose (luan-units/min) Oxytocin: 6 luan-units/min  Contraction Frequency (minutes): 2 - 4  Contraction Intensity: Moderate/Strong  Uterine Activity Characteristics: Irregular  Cervical Dilation: 5        Cervical Effacement: 80  Fetal Station: -1  Baseline Rate (FHR): 120 bpm  Fetal Heart Rate (FHT): 130 BPM  FHR Category: 2               Vital Signs:   Vitals:    07/13/24 0823   BP: 123/59   Pulse:    Resp:    Temp:        FHT still with occasional variables but much improved with amnioinfusion. Overall good variability and accelerations. SVE deferred. Will continue to monitor closely.     Lucero Batista MD 7/13/2024 8:40 AM

## 2024-07-14 VITALS
SYSTOLIC BLOOD PRESSURE: 113 MMHG | BODY MASS INDEX: 39.86 KG/M2 | HEIGHT: 68 IN | RESPIRATION RATE: 18 BRPM | HEART RATE: 69 BPM | WEIGHT: 263 LBS | TEMPERATURE: 97.3 F | DIASTOLIC BLOOD PRESSURE: 71 MMHG | OXYGEN SATURATION: 97 %

## 2024-07-14 PROBLEM — O13.9 GESTATIONAL HYPERTENSION: Status: ACTIVE | Noted: 2024-07-14

## 2024-07-14 PROCEDURE — NC001 PR NO CHARGE: Performed by: OBSTETRICS & GYNECOLOGY

## 2024-07-14 RX ORDER — IBUPROFEN 200 MG
600 TABLET ORAL EVERY 6 HOURS SCHEDULED
Start: 2024-07-14

## 2024-07-14 RX ORDER — ACETAMINOPHEN 325 MG/1
650 TABLET ORAL EVERY 6 HOURS SCHEDULED
Start: 2024-07-14

## 2024-07-14 RX ADMIN — ACETAMINOPHEN 650 MG: 325 TABLET, FILM COATED ORAL at 07:55

## 2024-07-14 RX ADMIN — ACETAMINOPHEN 650 MG: 325 TABLET, FILM COATED ORAL at 12:23

## 2024-07-14 RX ADMIN — DOCUSATE SODIUM 100 MG: 100 CAPSULE, LIQUID FILLED ORAL at 10:00

## 2024-07-14 RX ADMIN — IBUPROFEN 600 MG: 600 TABLET, FILM COATED ORAL at 12:23

## 2024-07-14 RX ADMIN — IBUPROFEN 600 MG: 600 TABLET, FILM COATED ORAL at 07:55

## 2024-07-14 RX ADMIN — ACETAMINOPHEN 650 MG: 325 TABLET, FILM COATED ORAL at 00:12

## 2024-07-14 RX ADMIN — SENNOSIDES 8.6 MG: 8.6 TABLET, FILM COATED ORAL at 10:00

## 2024-07-14 NOTE — PLAN OF CARE
Problem: POSTPARTUM  Goal: Experiences normal postpartum course  Description: INTERVENTIONS:  - Monitor maternal vital signs  - Assess uterine involution and lochia  Outcome: Progressing  Goal: Appropriate maternal -  bonding  Description: INTERVENTIONS:  - Identify family support  - Assess for appropriate maternal/infant bonding   -Encourage maternal/infant bonding opportunities  - Referral to  or  as needed  Outcome: Progressing  Goal: Establishment of infant feeding pattern  Description: INTERVENTIONS:  - Assess breast/bottle feeding  - Refer to lactation as needed  Outcome: Progressing  Goal: Incision(s), wounds(s) or drain site(s) healing without S/S of infection  Description: INTERVENTIONS  - Assess and document dressing, incision, wound bed, drain sites and surrounding tissue  - Provide patient and family education  - Perform skin care daily  Outcome: Progressing      EMS stated patient was standing speaking with them for a possible choking on a strawberry and then he became semi unresponsive. On arrival patient is non verbal awake but not verbalizing answers. Stroke alert initiated due to presentation and previous history of stroke.

## 2024-07-14 NOTE — PROGRESS NOTES
Obstetrics Progress Note  Edwige Jessica 24 y.o. female MRN: [id number]  Unit/Bed#: -01 Encounter: [id number]    Assessment/Plan:    Postpartum Day #1 s/p , currently stable. Baby in room. By issue:    Gestational hypertension  Assessment & Plan  Met criteria in labor   CBC/CMP wnl; UPC negligible  Asymptomatic at this time  Continue to monitor  Systolic (12hrs), Av , Min:123 , Max:143   Diastolic (12hrs), Av, Min:58, Max:87        *  (spontaneous vaginal delivery)  Assessment & Plan       Continue routine post partum care  Pain well controlled: tylenol/motrin scheduled  Lochia within normal limits: continue to monitor   OOB: as able, encourage ambulation  Passing flatus  Voiding spontaneously  Encourage breastfeeding  Baby in: room  Dispo: anticipate d/c home PPD1-2          Subjective/Objective     Subjective:   No acute events overnight. Pain: controlled. Tolerating PO: yes. Voiding: yes. Flatus: passing. Ambulating: yes. Chest pain: no. Shortness of breath: no. Leg pain: no. Lochia: within normal limits. Baby in room, feeding via breast. She would like to go home today if she is cleared.    Objective:   Vitals:   Temp:  [97.5 °F (36.4 °C)-98.6 °F (37 °C)] 97.9 °F (36.6 °C)  HR:  [71-93] 92  Resp:  [16-19] 16  BP: (112-150)/(58-93) 125/85       Intake/Output Summary (Last 24 hours) at 2024 0438  Last data filed at 2024 1750  Gross per 24 hour   Intake --   Output 1900 ml   Net -1900 ml       Lab Results   Component Value Date    WBC 9.01 2024    HGB 11.5 2024    HCT 34.4 (L) 2024    MCV 87 2024     2024       Physical Exam:   General: alert and oriented x3, in no apparent distress  Cardiovascular: regular rate  Pulmonary: normal effort  Abdomen: Soft, non-tender, non-distended, no rebound or guarding. Uterine fundus firm and non-tender, at the umbilicus  Extremities: Non tender with no edema      Lucero Batista MD  PGY-2,  LIZBETH  7/14/2024, 4:38 AM

## 2024-07-14 NOTE — ASSESSMENT & PLAN NOTE
Met criteria in labor   CBC/CMP wnl; UPC negligible  Asymptomatic at this time  Continue to monitor  Systolic (12hrs), Av , Min:123 , Max:143   Diastolic (12hrs), Av, Min:58, Max:87

## 2024-07-15 NOTE — UTILIZATION REVIEW
"Mother and baby discharged on 2024       NOTIFICATION OF INPATIENT ADMISSION   MATERNITY/DELIVERY AUTHORIZATION REQUEST   SERVICING FACILITY:   [facility]  Parent Child Health - L&D, , NICU  [address] [place] [address]  Tax ID: [id number]  NPI: [id number] ATTENDING PROVIDER:  Attending Name and NPI#: Flor Dick Do [[id number]]  Address: [address] [place] [address]  Phone: [phone]     ADMISSION INFORMATION:  Place of Service: Inpatient [facility]  Place of Service Code: 21  Inpatient Admission Date/Time: 24  8:19 PM  Discharge Date/Time: 2024  3:39 PM  Admitting Diagnosis Code/Description:  Pain [R52]   Mother: Edwige Jessica 2000 Estimated Date of Delivery: 7/10/24  Delivering clinician: Flor Dick   OB History          2    Para   2    Term   2       0    AB   0    Living   2         SAB        IAB        Ectopic        Multiple   0    Live Births   2                Name & MRN:   Information for the patient's :  [facility] Baby [facility] (Edwige) [[id number]]    Delivery Information:  Sex: female  Delivered 2024 10:45 AM by Vaginal, Spontaneous; Gestational Age: 40w3d     Measurements:  Weight: 7 lb 2.6 oz (3250 g);  Height: 20\"    APGAR 1 minute 5 minutes 10 minutes   Totals: 8 9       UTILIZATION REVIEW CONTACT:  Shayy Etienne, Utilization   Network Utilization Review Department  Phone: [phone]  Fax [phone]  Email: [email or web address]  Contact for approvals/pending authorizations, clinical reviews, and discharge.   PHYSICIAN ADVISORY SERVICES:  Medical Necessity Denial & Rkvx-xx-Iidw Review  Phone: [phone]  Fax: [phone]  Email: [email or web address]   DISCHARGE SUPPORT TEAM:  For Patients Discharge Needs & Updates  Phone: [phone] opt. 2 Fax: [phone]  Email: [email or web address]      "

## 2024-07-18 ENCOUNTER — TELEPHONE (OUTPATIENT)
Dept: OBGYN CLINIC | Facility: MEDICAL CENTER | Age: 24
End: 2024-07-18

## 2024-07-18 NOTE — TELEPHONE ENCOUNTER
POSTPARTUM PHONE CALL ASSESSMENT    Date of Delivery: 24    Delivering Provider:  Flor Dick    Mode:   Delivery Notes/Complications: No complications    Do you still have bleeding? no  If so, how much/how severe? light bleeding  Do you have any pain? no  If so, how much/how severe? denies pain    Regular BMs/Urination? no    Breastfeeding/Formula/Both? Breast and bottle feeding  How are you doing emotionally? Patient states she is doing well.   EPDS Score: EPDS sent , pt states she will complete    Do you have any other questions or concerns for us or your provider? no     Have you scheduled the pediatrician appointment with pediatrician? Yes was seen earlier this wk, has f/u tomorrow    Do you have a postpartum visit scheduled? No - scheduled today    Date scheduled: 24 9:15 Provider:   KIMBERLYN

## 2024-07-21 LAB — PLACENTA IN STORAGE: NORMAL

## 2024-08-20 NOTE — PROGRESS NOTES
Subjective     Edwige Jessica is a 24 y.o. y.o. female  who presents for a postpartum visit. She is 6 weeks postpartum following a spontaneous vaginal delivery on 24.  Pregnancy complicated by obesity, short interval pregnancy and history of gHTN.  The delivery was at 40.3 gestational weeks.  Labor and delivery was uncomplicated.  Female infant  weighing 7lb 2.6 oz with Apgars of 8/9. Postpartum course has been uncomplicated. Baby's course has been uncomplicated. Baby is feeding by formula. Bleeding no bleeding. Bowel function is normal. Bladder function is normal. Patient is not sexually active. Contraception method is OCP (estrogen/progesterone). Postpartum depression screening: negative.  Substance use screening: No risk.    The following portions of the patient's history were reviewed and updated as appropriate: allergies, current medications, past medical history, past social history, past surgical history, and problem list.    Review of Systems  Pertinent items are noted in HPI..    Objective      /90 recheck 136/82   Wt 240lb    General:   appears stated age, cooperative, alert normal mood and affect   Neck: Neck: normal, supple,trachea midline, no masses   Heart: regular rate and rhythm, S1, S2 normal, no murmur, click, rub or gallop   Lungs: clear to auscultation bilaterally     Assessment/Plan     6 week postpartum exam.   Pap smear due    1.  Elevated blood pressure-history of gestational hypertension 146/90 with recheck of 136/82.  Preeclamptic labs ordered.  Signs and symptoms to report reviewed.  2.Contraception: OCP (estrogen/progesterone).  Reviewed with patient to start OCPs after 8 weeks postpartum.  Common side effects reviewed including irregular vaginal bleeding, nausea and breast tenderness.  ACHES reviewed.  Written information provided  3. Patient verbalizes understanding of support and educational opportunities available at baby and [facility].  Written information  provided. Reviewed option for PP PT, referral placed  4. Signs and symptoms to report reviewed    Follow up in: 2 weeks for blood pressure check if labs are normal/if labs are abnormal may need earlier appointment.  3 months for annual exam and OCP check

## 2024-08-21 ENCOUNTER — POSTPARTUM VISIT (OUTPATIENT)
Dept: OBGYN CLINIC | Facility: MEDICAL CENTER | Age: 24
End: 2024-08-21
Payer: COMMERCIAL

## 2024-08-21 ENCOUNTER — APPOINTMENT (OUTPATIENT)
Dept: LAB | Facility: MEDICAL CENTER | Age: 24
End: 2024-08-21
Payer: COMMERCIAL

## 2024-08-21 VITALS
BODY MASS INDEX: 35.55 KG/M2 | DIASTOLIC BLOOD PRESSURE: 82 MMHG | WEIGHT: 240 LBS | SYSTOLIC BLOOD PRESSURE: 136 MMHG | HEIGHT: 69 IN

## 2024-08-21 DIAGNOSIS — Z30.011 ENCOUNTER FOR INITIAL PRESCRIPTION OF CONTRACEPTIVE PILLS: ICD-10-CM

## 2024-08-21 DIAGNOSIS — R03.0 ELEVATED BP WITHOUT DIAGNOSIS OF HYPERTENSION: ICD-10-CM

## 2024-08-21 DIAGNOSIS — Z87.59 HISTORY OF GESTATIONAL HYPERTENSION: ICD-10-CM

## 2024-08-21 PROBLEM — O13.9 GESTATIONAL HYPERTENSION: Status: RESOLVED | Noted: 2024-07-14 | Resolved: 2024-08-21

## 2024-08-21 PROBLEM — O09.899 SHORT INTERVAL BETWEEN PREGNANCIES AFFECTING PREGNANCY, ANTEPARTUM: Status: RESOLVED | Noted: 2024-04-12 | Resolved: 2024-08-21

## 2024-08-21 PROBLEM — O42.90 AMNIOTIC FLUID LEAKING: Status: RESOLVED | Noted: 2024-07-12 | Resolved: 2024-08-21

## 2024-08-21 PROBLEM — O99.213 OBESITY DURING PREGNANCY, ANTEPARTUM, THIRD TRIMESTER: Status: RESOLVED | Noted: 2023-03-02 | Resolved: 2024-08-21

## 2024-08-21 PROBLEM — O99.210 OBESITY IN PREGNANCY: Status: RESOLVED | Noted: 2024-04-12 | Resolved: 2024-08-21

## 2024-08-21 LAB
ALBUMIN SERPL BCG-MCNC: 4.4 G/DL (ref 3.5–5)
ALP SERPL-CCNC: 86 U/L (ref 34–104)
ALT SERPL W P-5'-P-CCNC: 27 U/L (ref 7–52)
ANION GAP SERPL CALCULATED.3IONS-SCNC: 12 MMOL/L (ref 4–13)
AST SERPL W P-5'-P-CCNC: 22 U/L (ref 13–39)
BASOPHILS # BLD AUTO: 0.04 THOUSANDS/ÂΜL (ref 0–0.1)
BASOPHILS NFR BLD AUTO: 1 % (ref 0–1)
BILIRUB SERPL-MCNC: 0.51 MG/DL (ref 0.2–1)
BUN SERPL-MCNC: 7 MG/DL (ref 5–25)
CALCIUM SERPL-MCNC: 9.7 MG/DL (ref 8.4–10.2)
CHLORIDE SERPL-SCNC: 104 MMOL/L (ref 96–108)
CO2 SERPL-SCNC: 24 MMOL/L (ref 21–32)
CREAT SERPL-MCNC: 0.8 MG/DL (ref 0.6–1.3)
CREAT UR-MCNC: 112.8 MG/DL
EOSINOPHIL # BLD AUTO: 0.09 THOUSAND/ÂΜL (ref 0–0.61)
EOSINOPHIL NFR BLD AUTO: 1 % (ref 0–6)
ERYTHROCYTE [DISTWIDTH] IN BLOOD BY AUTOMATED COUNT: 15.7 % (ref 11.6–15.1)
GFR SERPL CREATININE-BSD FRML MDRD: 102 ML/MIN/1.73SQ M
GLUCOSE P FAST SERPL-MCNC: 92 MG/DL (ref 65–99)
HCT VFR BLD AUTO: 41.7 % (ref 34.8–46.1)
HGB BLD-MCNC: 13.3 G/DL (ref 11.5–15.4)
IMM GRANULOCYTES # BLD AUTO: 0.03 THOUSAND/UL (ref 0–0.2)
IMM GRANULOCYTES NFR BLD AUTO: 0 % (ref 0–2)
LYMPHOCYTES # BLD AUTO: 1.91 THOUSANDS/ÂΜL (ref 0.6–4.47)
LYMPHOCYTES NFR BLD AUTO: 26 % (ref 14–44)
MCH RBC QN AUTO: 27.5 PG (ref 26.8–34.3)
MCHC RBC AUTO-ENTMCNC: 31.9 G/DL (ref 31.4–37.4)
MCV RBC AUTO: 86 FL (ref 82–98)
MONOCYTES # BLD AUTO: 0.48 THOUSAND/ÂΜL (ref 0.17–1.22)
MONOCYTES NFR BLD AUTO: 7 % (ref 4–12)
NEUTROPHILS # BLD AUTO: 4.88 THOUSANDS/ÂΜL (ref 1.85–7.62)
NEUTS SEG NFR BLD AUTO: 65 % (ref 43–75)
NRBC BLD AUTO-RTO: 0 /100 WBCS
PLATELET # BLD AUTO: 409 THOUSANDS/UL (ref 149–390)
PMV BLD AUTO: 9.5 FL (ref 8.9–12.7)
POTASSIUM SERPL-SCNC: 3.8 MMOL/L (ref 3.5–5.3)
PROT SERPL-MCNC: 7.4 G/DL (ref 6.4–8.4)
PROT UR-MCNC: 6.7 MG/DL
PROT/CREAT UR: 0.1 MG/G{CREAT} (ref 0–0.1)
RBC # BLD AUTO: 4.84 MILLION/UL (ref 3.81–5.12)
SODIUM SERPL-SCNC: 140 MMOL/L (ref 135–147)
WBC # BLD AUTO: 7.43 THOUSAND/UL (ref 4.31–10.16)

## 2024-08-21 PROCEDURE — 85025 COMPLETE CBC W/AUTO DIFF WBC: CPT

## 2024-08-21 PROCEDURE — 36415 COLL VENOUS BLD VENIPUNCTURE: CPT

## 2024-08-21 PROCEDURE — 84156 ASSAY OF PROTEIN URINE: CPT

## 2024-08-21 PROCEDURE — 82570 ASSAY OF URINE CREATININE: CPT

## 2024-08-21 PROCEDURE — 80053 COMPREHEN METABOLIC PANEL: CPT

## 2024-08-21 RX ORDER — DESOGESTREL AND ETHINYL ESTRADIOL 21-5 (28)
1 KIT ORAL DAILY
Qty: 90 TABLET | Refills: 1 | Status: SHIPPED | OUTPATIENT
Start: 2024-08-21

## 2024-08-22 ENCOUNTER — TELEPHONE (OUTPATIENT)
Age: 24
End: 2024-08-22

## 2024-08-22 NOTE — TELEPHONE ENCOUNTER
----- Message from MAURO Almaraz sent at 8/22/2024  7:24 AM EDT -----  Please let Edwige know labs are not diagnostic for preeclampsia.  Would recommend appointment to check blood pressure.  I believe she scheduled prior to leaving office.    Thank you

## 2024-08-22 NOTE — TELEPHONE ENCOUNTER
Pt returned a call. RN went over recommendations and answered questions. Pt scheduled for BP check in the office on 9/3/24.

## 2024-10-28 NOTE — PROGRESS NOTES
A fetal ultrasound was completed  See Ob procedures in Epic for an interpretation and recommendations  Do not hesitate to contact us in [facility] if you have questions  Justino Mcnulty MD, [address]  Maternal Fetal Medicine Him/He

## 2024-11-13 ENCOUNTER — HOSPITAL ENCOUNTER (EMERGENCY)
Facility: HOSPITAL | Age: 24
Discharge: HOME | End: 2024-11-14
Attending: EMERGENCY MEDICINE
Payer: COMMERCIAL

## 2024-11-13 DIAGNOSIS — R10.9 ABDOMINAL PAIN, UNSPECIFIED ABDOMINAL LOCATION: Primary | ICD-10-CM

## 2024-11-13 DIAGNOSIS — R11.10 VOMITING: ICD-10-CM

## 2024-11-13 DIAGNOSIS — R11.0 NAUSEA: ICD-10-CM

## 2024-11-13 PROCEDURE — 99284 EMERGENCY DEPT VISIT MOD MDM: CPT

## 2024-11-14 ENCOUNTER — APPOINTMENT (EMERGENCY)
Dept: CT IMAGING | Facility: HOSPITAL | Age: 24
End: 2024-11-14
Payer: COMMERCIAL

## 2024-11-14 VITALS
WEIGHT: 230.16 LBS | SYSTOLIC BLOOD PRESSURE: 119 MMHG | RESPIRATION RATE: 18 BRPM | TEMPERATURE: 97.7 F | BODY MASS INDEX: 33.99 KG/M2 | OXYGEN SATURATION: 98 % | DIASTOLIC BLOOD PRESSURE: 58 MMHG | HEART RATE: 65 BPM

## 2024-11-14 LAB
ALBUMIN SERPL BCG-MCNC: 4 G/DL (ref 3.5–5)
ALP SERPL-CCNC: 63 U/L (ref 34–104)
ALT SERPL W P-5'-P-CCNC: 9 U/L (ref 7–52)
ANION GAP SERPL CALCULATED.3IONS-SCNC: 9 MMOL/L (ref 4–13)
AST SERPL W P-5'-P-CCNC: 11 U/L (ref 13–39)
BACTERIA UR QL AUTO: ABNORMAL /HPF
BASOPHILS # BLD AUTO: 0.04 THOUSANDS/ÂΜL (ref 0–0.1)
BASOPHILS NFR BLD AUTO: 0 % (ref 0–1)
BILIRUB SERPL-MCNC: 0.45 MG/DL (ref 0.2–1)
BILIRUB UR QL STRIP: NEGATIVE
BUN SERPL-MCNC: 11 MG/DL (ref 5–25)
CALCIUM SERPL-MCNC: 8.8 MG/DL (ref 8.4–10.2)
CHLORIDE SERPL-SCNC: 105 MMOL/L (ref 96–108)
CLARITY UR: CLEAR
CO2 SERPL-SCNC: 24 MMOL/L (ref 21–32)
COLOR UR: YELLOW
CREAT SERPL-MCNC: 0.83 MG/DL (ref 0.6–1.3)
EOSINOPHIL # BLD AUTO: 0.03 THOUSAND/ÂΜL (ref 0–0.61)
EOSINOPHIL NFR BLD AUTO: 0 % (ref 0–6)
ERYTHROCYTE [DISTWIDTH] IN BLOOD BY AUTOMATED COUNT: 13.9 % (ref 11.6–15.1)
EXT PREGNANCY TEST URINE: NEGATIVE
EXT. CONTROL: NORMAL
GFR SERPL CREATININE-BSD FRML MDRD: 98 ML/MIN/1.73SQ M
GLUCOSE SERPL-MCNC: 99 MG/DL (ref 65–140)
GLUCOSE UR STRIP-MCNC: NEGATIVE MG/DL
HCT VFR BLD AUTO: 39.6 % (ref 34.8–46.1)
HGB BLD-MCNC: 13.1 G/DL (ref 11.5–15.4)
HGB UR QL STRIP.AUTO: NEGATIVE
IMM GRANULOCYTES # BLD AUTO: 0.03 THOUSAND/UL (ref 0–0.2)
IMM GRANULOCYTES NFR BLD AUTO: 0 % (ref 0–2)
KETONES UR STRIP-MCNC: ABNORMAL MG/DL
LEUKOCYTE ESTERASE UR QL STRIP: ABNORMAL
LIPASE SERPL-CCNC: 14 U/L (ref 11–82)
LYMPHOCYTES # BLD AUTO: 1.66 THOUSANDS/ÂΜL (ref 0.6–4.47)
LYMPHOCYTES NFR BLD AUTO: 16 % (ref 14–44)
MCH RBC QN AUTO: 28 PG (ref 26.8–34.3)
MCHC RBC AUTO-ENTMCNC: 33.1 G/DL (ref 31.4–37.4)
MCV RBC AUTO: 85 FL (ref 82–98)
MONOCYTES # BLD AUTO: 0.51 THOUSAND/ÂΜL (ref 0.17–1.22)
MONOCYTES NFR BLD AUTO: 5 % (ref 4–12)
MUCOUS THREADS UR QL AUTO: ABNORMAL
NEUTROPHILS # BLD AUTO: 8.12 THOUSANDS/ÂΜL (ref 1.85–7.62)
NEUTS SEG NFR BLD AUTO: 79 % (ref 43–75)
NITRITE UR QL STRIP: NEGATIVE
NON-SQ EPI CELLS URNS QL MICRO: ABNORMAL /HPF
NRBC BLD AUTO-RTO: 0 /100 WBCS
PH UR STRIP.AUTO: 6 [PH]
PLATELET # BLD AUTO: 368 THOUSANDS/UL (ref 149–390)
PMV BLD AUTO: 9.3 FL (ref 8.9–12.7)
POTASSIUM SERPL-SCNC: 3.8 MMOL/L (ref 3.5–5.3)
PROT SERPL-MCNC: 6.9 G/DL (ref 6.4–8.4)
PROT UR STRIP-MCNC: ABNORMAL MG/DL
RBC # BLD AUTO: 4.68 MILLION/UL (ref 3.81–5.12)
RBC #/AREA URNS AUTO: ABNORMAL /HPF
SODIUM SERPL-SCNC: 138 MMOL/L (ref 135–147)
SP GR UR STRIP.AUTO: 1.03 (ref 1–1.03)
UROBILINOGEN UR STRIP-ACNC: 2 MG/DL
WBC # BLD AUTO: 10.39 THOUSAND/UL (ref 4.31–10.16)
WBC #/AREA URNS AUTO: ABNORMAL /HPF

## 2024-11-14 PROCEDURE — 81001 URINALYSIS AUTO W/SCOPE: CPT

## 2024-11-14 PROCEDURE — 83690 ASSAY OF LIPASE: CPT

## 2024-11-14 PROCEDURE — 36415 COLL VENOUS BLD VENIPUNCTURE: CPT

## 2024-11-14 PROCEDURE — 96374 THER/PROPH/DIAG INJ IV PUSH: CPT

## 2024-11-14 PROCEDURE — 85025 COMPLETE CBC W/AUTO DIFF WBC: CPT

## 2024-11-14 PROCEDURE — 96361 HYDRATE IV INFUSION ADD-ON: CPT

## 2024-11-14 PROCEDURE — 96375 TX/PRO/DX INJ NEW DRUG ADDON: CPT

## 2024-11-14 PROCEDURE — 80053 COMPREHEN METABOLIC PANEL: CPT

## 2024-11-14 PROCEDURE — 74177 CT ABD & PELVIS W/CONTRAST: CPT

## 2024-11-14 PROCEDURE — 99285 EMERGENCY DEPT VISIT HI MDM: CPT

## 2024-11-14 PROCEDURE — 81025 URINE PREGNANCY TEST: CPT

## 2024-11-14 RX ORDER — KETOROLAC TROMETHAMINE 30 MG/ML
15 INJECTION, SOLUTION INTRAMUSCULAR; INTRAVENOUS ONCE
Status: COMPLETED | OUTPATIENT
Start: 2024-11-14 | End: 2024-11-14

## 2024-11-14 RX ORDER — ONDANSETRON 4 MG/1
4 TABLET, FILM COATED ORAL EVERY 6 HOURS
Qty: 12 TABLET | Refills: 0 | Status: SHIPPED | OUTPATIENT
Start: 2024-11-14

## 2024-11-14 RX ORDER — PANTOPRAZOLE SODIUM 40 MG/1
40 TABLET, DELAYED RELEASE ORAL DAILY
Qty: 20 TABLET | Refills: 0 | Status: SHIPPED | OUTPATIENT
Start: 2024-11-14 | End: 2024-12-04

## 2024-11-14 RX ORDER — PANTOPRAZOLE SODIUM 40 MG/10ML
40 INJECTION, POWDER, LYOPHILIZED, FOR SOLUTION INTRAVENOUS ONCE
Status: COMPLETED | OUTPATIENT
Start: 2024-11-14 | End: 2024-11-14

## 2024-11-14 RX ORDER — MAGNESIUM HYDROXIDE/ALUMINUM HYDROXICE/SIMETHICONE 120; 1200; 1200 MG/30ML; MG/30ML; MG/30ML
30 SUSPENSION ORAL ONCE
Status: COMPLETED | OUTPATIENT
Start: 2024-11-14 | End: 2024-11-14

## 2024-11-14 RX ORDER — ALUMINUM HYDROXIDE, MAGNESIUM HYDROXIDE, SIMETHICONE 400; 400; 40 MG/10ML; MG/10ML; MG/10ML
30 SUSPENSION ORAL
Qty: 3600 ML | Refills: 0 | Status: SHIPPED | OUTPATIENT
Start: 2024-11-14

## 2024-11-14 RX ORDER — PANTOPRAZOLE SODIUM 40 MG/1
40 TABLET, DELAYED RELEASE ORAL DAILY
Qty: 20 TABLET | Refills: 0 | Status: SHIPPED | OUTPATIENT
Start: 2024-11-14 | End: 2024-11-14

## 2024-11-14 RX ADMIN — KETOROLAC TROMETHAMINE 15 MG: 30 INJECTION, SOLUTION INTRAMUSCULAR; INTRAVENOUS at 00:54

## 2024-11-14 RX ADMIN — IOHEXOL 100 ML: 350 INJECTION, SOLUTION INTRAVENOUS at 02:21

## 2024-11-14 RX ADMIN — PANTOPRAZOLE SODIUM 40 MG: 40 INJECTION, POWDER, LYOPHILIZED, FOR SOLUTION INTRAVENOUS at 00:56

## 2024-11-14 RX ADMIN — SODIUM CHLORIDE 1000 ML: 0.9 INJECTION, SOLUTION INTRAVENOUS at 00:52

## 2024-11-14 RX ADMIN — ALUMINUM HYDROXIDE, MAGNESIUM HYDROXIDE, AND DIMETHICONE 30 ML: 200; 20; 200 SUSPENSION ORAL at 00:44

## 2024-11-14 NOTE — DISCHARGE INSTRUCTIONS
Take medication as prescribed  Follow-up with gastroenterology in outpatient setting  Return emergency department for increasing fever, body aches, chills, increasing abdominal pain, intractable nausea and vomiting, new onset hematemesis/bloody vomiting, dark or melanotic stools, or bright red blood per rectum.

## 2024-11-15 NOTE — ED PROVIDER NOTES
"Time reflects when diagnosis was documented in both MDM as applicable and the Disposition within this note       Time User Action Codes Description Comment    11/14/2024  3:49 AM Lucas Colon [R10.9] Abdominal pain, unspecified abdominal location     11/14/2024  3:49 AM Lucas Colon [R11.10] Vomiting     11/14/2024  3:49 AM Lucas Colon [R11.0] Nausea           ED Disposition       ED Disposition   Discharge    Condition   Stable    Date/Time   u Nov 14, 2024  3:48 AM    Comment   Edwige Jessica discharge to home/self care.                   Assessment & Plan       Medical Decision Making  Patient is a 24-year-old female present emergency department on and off abdominal pain for 1 month.  Patient states that the pain radiates to the back.  Patient states that as of today she started with vomiting.  Patient states that there is some \"blood in the vomit.  Patient unable to quantify how much blood.  Patient states that she threw up \"a clot that was very tiny\".  Patient describes abdominal pain as a crampy sharp stabbing abdominal pain that she would describe as a 5-10.  Physical exam the abdomen shows normal active bowel sounds, with abdominal tenderness in the epigastric region.  No Fajardo sign or any other peritoneal signs.  No CVA tenderness noted on exam. DDx including but not limited to: appendicitis, gastroenteritis, gastritis, PUD, GERD, gastroparesis, hepatitis, pancreatitis, colitis, enteritis, food poisoning, mesenteric adenitis, epiploic appendagitis, IBD, IBS, ileus, bowel obstruction, volvulus, cholecystitis, biliary colic, choledocholithiasis, perforated viscus, tumor, splenic etiology, diverticulitis, internal hernia, constipation, pelvic pathology, renal colic, pyelonephritis, UTI.  CBC shows mild leukocytosis at 10.9 most likely reactive to vomiting.  CMP shows no acute relevant abnormalities.  CT of the abdomen shows no acute inflammatory process or severe bleed.  Patient's " "hemoglobin stable at this time.  Lipase within normal limits.  Findings most consistent with a viral gastroenteritis at this time.  Discussed with patient that for proper diagnosis, patient is to follow-up with gastroenterology in outpatient setting for potential EGD and colonoscopy due to \"bleeding\".  Patient started on Zofran for nausea, along with Maalox and pantoprazole for further relief of abdominal pain.  Patient noted improvement after administration of the symptoms in the emergency department though showing it is more of a gastric etiology.  Discussed with patient to return emergency department for increasing fever, body aches, chills, increasing abdominal pain, intractable nausea and vomiting, new onset hematemesis/bloody vomiting, dark or melanotic stools, or bright red blood per rectum.  Patient verbalized understanding agrees to current plan.  Patient discharged home stable condition at this time.    Amount and/or Complexity of Data Reviewed  Labs: ordered.  Radiology: ordered.    Risk  OTC drugs.  Prescription drug management.             Medications   sodium chloride 0.9 % bolus 1,000 mL (0 mL Intravenous Stopped 11/14/24 0202)   aluminum-magnesium hydroxide-simethicone (MAALOX) oral suspension 30 mL (30 mL Oral Given 11/14/24 0044)   pantoprazole (PROTONIX) injection 40 mg (40 mg Intravenous Given 11/14/24 0056)   ketorolac (TORADOL) injection 15 mg (15 mg Intravenous Given 11/14/24 0054)   iohexol (OMNIPAQUE) 350 MG/ML injection (MULTI-DOSE) 100 mL (100 mL Intravenous Given 11/14/24 0221)       ED Risk Strat Scores                                               History of Present Illness       Chief Complaint   Patient presents with    Abdominal Pain     On and off abd pain for about a month. States pain radiates to back. Vomiting with pain. States some blood in vomit. Denies diarrhea.       Past Medical History:   Diagnosis Date    Varicella       Past Surgical History:   Procedure Laterality Date " "   EYE SURGERY  2000      Family History   Problem Relation Age of Onset    Hypertension Mother     Heart disease Father     No Known Problems Brother     No Known Problems Maternal Grandmother     Cancer Maternal Grandfather     Stroke Maternal Grandfather     Stroke Paternal Grandmother     Breast cancer Paternal Grandmother     Cancer Paternal Grandmother     Diabetes Paternal Grandmother     Thyroid disease Paternal Grandmother     Lung cancer Paternal Grandfather     Cancer Paternal Grandfather     No Known Problems Half-Sister     No Known Problems Half-Sister     Breast cancer Maternal Aunt     Colon cancer Neg Hx     Ovarian cancer Neg Hx       Social History     Tobacco Use    Smoking status: Never    Smokeless tobacco: Never   Vaping Use    Vaping status: Never Used   Substance Use Topics    Alcohol use: Not Currently    Drug use: Not Currently     Comment: denies in past 2 years      E-Cigarette/Vaping    E-Cigarette Use Never User       E-Cigarette/Vaping Substances    Nicotine No     THC No     CBD No     Flavoring No     Other No     Unknown No       I have reviewed and agree with the history as documented.     Patient is a 24-year-old female present emergency department on and off abdominal pain for 1 month.  Patient states that the pain radiates to the back.  Patient states that as of today she started with vomiting.  Patient states that there is some \"blood in the vomit.  Patient unable to quantify how much blood.  Patient states that she threw up \"a clot that was very tiny\".  Patient describes abdominal pain as a crampy sharp stabbing abdominal pain that she would describe as a 5-10.  Patient states she does not take anything and it resolves on its own.  Patient does not recall the first time that this happened.  Patient denies any family history of any intra-abdominal pathologies.  Patient denies any fever, body aches, chills, diarrhea, constipation, melanotic stools, bright red blood per rectum, " or any  symptoms at this time.      Abdominal Pain  Associated symptoms: nausea and vomiting    Associated symptoms: no chest pain, no chills, no constipation, no cough, no diarrhea, no dysuria, no fatigue, no fever, no hematuria, no shortness of breath, no sore throat, no vaginal bleeding and no vaginal discharge        Review of Systems   Constitutional:  Negative for chills, diaphoresis, fatigue and fever.   HENT:  Negative for congestion, ear pain, rhinorrhea, sinus pressure, sinus pain and sore throat.    Eyes:  Negative for pain, redness and visual disturbance.   Respiratory:  Negative for cough, choking, chest tightness, shortness of breath, wheezing and stridor.    Cardiovascular:  Negative for chest pain, palpitations and leg swelling.   Gastrointestinal:  Positive for abdominal pain, nausea and vomiting. Negative for abdominal distention, blood in stool, constipation, diarrhea and rectal pain.   Genitourinary:  Negative for decreased urine volume, difficulty urinating, dysuria, flank pain, frequency, genital sores, hematuria, pelvic pain, urgency, vaginal bleeding, vaginal discharge and vaginal pain.   Musculoskeletal:  Negative for arthralgias and back pain.   Skin:  Negative for color change and rash.   Neurological:  Negative for dizziness, seizures, syncope, weakness, light-headedness and headaches.   All other systems reviewed and are negative.          Objective       ED Triage Vitals   Temperature Pulse Blood Pressure Respirations SpO2 Patient Position - Orthostatic VS   11/13/24 2352 11/13/24 2352 11/13/24 2352 11/13/24 2352 11/13/24 2352 11/13/24 2352   97.7 °F (36.5 °C) 77 169/75 18 99 % Sitting      Temp Source Heart Rate Source BP Location FiO2 (%) Pain Score    11/13/24 2352 11/13/24 2352 11/13/24 2352 -- 11/14/24 0054    Oral Monitor Right arm  3      Vitals      Date and Time Temp Pulse SpO2 Resp BP Pain Score FACES Pain Rating User   11/14/24 0300 -- 65 98 % 18 119/58 -- --    11/14/24  0230 -- 74 98 % 18 134/72 -- --    11/14/24 0202 -- -- -- -- -- No Pain --    11/14/24 0200 -- 71 99 % 18 131/64 -- --    11/14/24 0130 -- 73 99 % 18 129/67 -- --    11/14/24 0054 -- -- -- -- -- 3 --    11/13/24 2352 97.7 °F (36.5 °C) 77 99 % 18 169/75 -- -- JR            Physical Exam  Vitals and nursing note reviewed.   Constitutional:       General: She is not in acute distress.     Appearance: She is well-developed. She is not ill-appearing.   HENT:      Head: Normocephalic and atraumatic.      Mouth/Throat:      Mouth: Mucous membranes are moist.      Pharynx: Oropharynx is clear. No pharyngeal swelling or oropharyngeal exudate.   Eyes:      General: No scleral icterus.     Extraocular Movements: Extraocular movements intact.      Conjunctiva/sclera: Conjunctivae normal.      Pupils: Pupils are equal, round, and reactive to light.   Cardiovascular:      Rate and Rhythm: Normal rate and regular rhythm.      Heart sounds: Normal heart sounds. No murmur heard.     No friction rub. No gallop.   Pulmonary:      Effort: Pulmonary effort is normal. No respiratory distress.      Breath sounds: Normal breath sounds. No stridor. No wheezing, rhonchi or rales.   Chest:      Chest wall: No tenderness.   Abdominal:      General: Bowel sounds are normal. There is no distension or abdominal bruit. There are no signs of injury.      Palpations: Abdomen is soft. There is no shifting dullness, fluid wave, hepatomegaly, splenomegaly, mass or pulsatile mass.      Tenderness: There is abdominal tenderness in the epigastric area. There is no right CVA tenderness, left CVA tenderness, guarding or rebound. Negative signs include Fajardo's sign, Rovsing's sign, McBurney's sign, psoas sign and obturator sign.      Hernia: No hernia is present.   Musculoskeletal:         General: No swelling.      Cervical back: Neck supple.   Skin:     General: Skin is warm and dry.      Capillary Refill: Capillary refill takes less than 2  seconds.      Coloration: Skin is not cyanotic, jaundiced, mottled or pale.      Findings: No erythema or rash.   Neurological:      Mental Status: She is alert.   Psychiatric:         Mood and Affect: Mood normal.         Results Reviewed       Procedure Component Value Units Date/Time    Comprehensive metabolic panel [[id number]]  (Abnormal) Collected: 11/14/24 0054    Lab Status: Final result Specimen: Blood from Arm, Left Updated: 11/14/24 0120     Sodium 138 mmol/L      Potassium 3.8 mmol/L      Chloride 105 mmol/L      CO2 24 mmol/L      ANION GAP 9 mmol/L      BUN 11 mg/dL      Creatinine 0.83 mg/dL      Glucose 99 mg/dL      Calcium 8.8 mg/dL      AST 11 U/L      ALT 9 U/L      Alkaline Phosphatase 63 U/L      Total Protein 6.9 g/dL      Albumin 4.0 g/dL      Total Bilirubin 0.45 mg/dL      eGFR 98 ml/min/1.73sq m     Narrative:      [employer] guidelines for Chronic Kidney Disease (CKD):     Stage 1 with normal or high GFR (GFR > 90 mL/min/1.73 square meters)    Stage 2 Mild CKD (GFR = 60-89 mL/min/1.73 square meters)    Stage 3A Moderate CKD (GFR = 45-59 mL/min/1.73 square meters)    Stage 3B Moderate CKD (GFR = 30-44 mL/min/1.73 square meters)    Stage 4 Severe CKD (GFR = 15-29 mL/min/1.73 square meters)    Stage 5 End Stage CKD (GFR <15 mL/min/1.73 square meters)  Note: GFR calculation is accurate only with a steady state creatinine    Lipase [[id number]]  (Normal) Collected: 11/14/24 0054    Lab Status: Final result Specimen: Blood from Arm, Left Updated: 11/14/24 0120     Lipase 14 u/L     Urine Microscopic [[id number]]  (Abnormal) Collected: 11/14/24 0044    Lab Status: Final result Specimen: Urine, Clean Catch Updated: 11/14/24 0116     RBC, UA 1-2 /hpf      WBC, UA 1-2 /hpf      Epithelial Cells Occasional /hpf      Bacteria, UA None Seen /hpf      MUCUS THREADS Moderate    UA w Reflex to Microscopic w Reflex to Culture [[id number]]  (Abnormal) Collected: 11/14/24 0044    Lab  Status: Final result Specimen: Urine, Clean Catch Updated: 11/14/24 0111     Color, UA Yellow     Clarity, UA Clear     Specific Gravity, UA 1.031     pH, UA 6.0     Leukocytes, UA Trace     Nitrite, UA Negative     Protein, UA Trace mg/dl      Glucose, UA Negative mg/dl      Ketones, UA Trace mg/dl      Urobilinogen, UA 2.0 mg/dl      Bilirubin, UA Negative     Occult Blood, UA Negative    CBC and differential [[id number]]  (Abnormal) Collected: 11/14/24 0054    Lab Status: Final result Specimen: Blood from Arm, Left Updated: 11/14/24 0106     WBC 10.39 Thousand/uL      RBC 4.68 Million/uL      Hemoglobin 13.1 g/dL      Hematocrit 39.6 %      MCV 85 fL      MCH 28.0 pg      MCHC 33.1 g/dL      RDW 13.9 %      MPV 9.3 fL      Platelets 368 Thousands/uL      nRBC 0 /100 WBCs      Segmented % 79 %      Immature Grans % 0 %      Lymphocytes % 16 %      Monocytes % 5 %      Eosinophils Relative 0 %      Basophils Relative 0 %      Absolute Neutrophils 8.12 Thousands/µL      Absolute Immature Grans 0.03 Thousand/uL      Absolute Lymphocytes 1.66 Thousands/µL      Absolute Monocytes 0.51 Thousand/µL      Eosinophils Absolute 0.03 Thousand/µL      Basophils Absolute 0.04 Thousands/µL     POCT pregnancy, urine [[id number]]  (Normal) Collected: 11/14/24 0045    Lab Status: Final result Updated: 11/14/24 0045     EXT Preg Test, Ur Negative     Control Valid            CT abdomen pelvis with contrast   Final Interpretation by Clay Hawthorne MD (11/14 0311)      No acute intra-abdominal/pelvic abnormalities with findings detailed above.         Workstation performed: [id number]             Procedures    ED Medication and Procedure Management   Prior to Admission Medications   Prescriptions Last Dose Informant Patient Reported? Taking?   desogestrel-ethinyl estradiol (KARIVA) 0.15-0.02/0.01 MG (21/5) per tablet   No No   Sig: Take 1 tablet by mouth daily      Facility-Administered Medications: None     Discharge Medication List as  of 11/14/2024  3:51 AM        START taking these medications    Details   aluminum-magnesium hydroxide-simethicone (MAALOX) 200-200-20 MG/5ML SUSP Take 30 mL by mouth 4 (four) times a day (before meals and at bedtime), Starting u 11/14/2024, Normal      ondansetron (ZOFRAN) 4 mg tablet Take 1 tablet (4 mg total) by mouth every 6 (six) hours, Starting u 11/14/2024, Normal           CONTINUE these medications which have CHANGED    Details   pantoprazole (PROTONIX) 40 mg tablet Take 1 tablet (40 mg total) by mouth daily for 20 days, Starting u 11/14/2024, Until Wed 12/4/2024, Normal           CONTINUE these medications which have NOT CHANGED    Details   desogestrel-ethinyl estradiol (KARIVA) 0.15-0.02/0.01 MG (21/5) per tablet Take 1 tablet by mouth daily, Starting Wed 8/21/2024, Normal             ED SEPSIS DOCUMENTATION   Time reflects when diagnosis was documented in both MDM as applicable and the Disposition within this note       Time User Action Codes Description Comment    11/14/2024  3:49 AM Lucas Colon [R10.9] Abdominal pain, unspecified abdominal location     11/14/2024  3:49 AM Lucas Colon [R11.10] Vomiting     11/14/2024  3:49 AM Lucas Colon [R11.0] Nausea                  Lucas Colon PA-C  11/14/24 6988

## 2024-11-26 ENCOUNTER — CONSULT (OUTPATIENT)
Dept: GASTROENTEROLOGY | Facility: MEDICAL CENTER | Age: 24
End: 2024-11-26
Payer: COMMERCIAL

## 2024-11-26 VITALS
HEART RATE: 84 BPM | SYSTOLIC BLOOD PRESSURE: 159 MMHG | BODY MASS INDEX: 33.7 KG/M2 | TEMPERATURE: 98.6 F | WEIGHT: 228.2 LBS | DIASTOLIC BLOOD PRESSURE: 119 MMHG

## 2024-11-26 DIAGNOSIS — R11.2 NAUSEA AND VOMITING, UNSPECIFIED VOMITING TYPE: Primary | ICD-10-CM

## 2024-11-26 DIAGNOSIS — R10.9 ABDOMINAL PAIN, UNSPECIFIED ABDOMINAL LOCATION: ICD-10-CM

## 2024-11-26 DIAGNOSIS — K92.0 HEMATEMESIS WITH NAUSEA: ICD-10-CM

## 2024-11-26 PROCEDURE — 99204 OFFICE O/P NEW MOD 45 MIN: CPT | Performed by: NURSE PRACTITIONER

## 2024-11-26 RX ORDER — SODIUM CHLORIDE, SODIUM LACTATE, POTASSIUM CHLORIDE, CALCIUM CHLORIDE 600; 310; 30; 20 MG/100ML; MG/100ML; MG/100ML; MG/100ML
125 INJECTION, SOLUTION INTRAVENOUS CONTINUOUS
OUTPATIENT
Start: 2024-11-26

## 2024-11-26 NOTE — PROGRESS NOTES
"Name: Edwige Jessica      : 2000      MRN: [id number]  Encounter Provider: MAURO Santos  Encounter Date: 2024   Encounter department: [facility]  :  Assessment & Plan  Abdominal pain, unspecified abdominal location  Reports symptoms started approximately 2 months ago.  No aggravating or relieving factors.  No marijuana use.  Occasional caffeine use.  Rare NSAID use.  Rare alcohol use.  Reports she gets episodes that last for approximately 5 hours of nausea, vomiting and upper abdominal pain.  Reports the abdominal pain is sharp and stabbing in feels like a \"band like\" feeling around her upper abdomen.  Cannot pinpoint triggers.  No alleviating factors.  She has been on pantoprazole and Zofran with no help.  Reports that she did have an episode of hematemesis.  She thinks she may have vomited a \"blood clot.\"  Will rule out PUD, gastritis/esophagitis, celiac disease and H. pylori.  She stopped the pantoprazole and ondansetron because it did not help.    Orders:    Ambulatory Referral to Gastroenterology    NM hepatobiliary w rx; Future    EGD; Future    Tissue Transglutaminase, IgA; Future    IgA; Future  -Antireflux diet  Nausea and vomiting, unspecified vomiting type  Refer above  Orders:    EGD; Future    Tissue Transglutaminase, IgA; Future    IgA; Future    Hematemesis with nausea  Had 1 episode of hematemesis.  Patient cannot quantify the amount of blood.  Reports she did vomit a \"blood clot\" once.       I reviewed with patient/family potential risks of endoscopic evaluation including possible infection, bleeding or perforation.  Patient/family verbalized understanding of potential risks and agreed to procedure(s).    History of Present Illness     HPI  Edwige Jessica is a 24 y.o. female who presents nausea, vomiting, hematemesis and abdominal pain.  She has a past medical history of HTN.  Presented to the ER 2024 with on and " "off abdominal pain for 1 month.  Radiates to her back.  Then developed vomiting.  Reports some \"blood\" in her vomit.  Patient unable to quantify how much blood.  Reports she threw up a clot that was very tiny.  Reports abdominal pain is a cramping sharp stabbing abdominal pain that she would describe a 5/10.  CT abdomen pelvis in the ER was normal.  CBC and CMP were normal other than WBCs of 10.3.  She was sent home pantoprazole 40 mg daily and Zofran as needed.    Reports symptoms started approximately 2 months ago.  No aggravating or relieving factors.  No marijuana use.  Occasional caffeine use.  Rare NSAID use.  Rare alcohol use.  Reports she gets episodes that last for approximately 5 hours of nausea, vomiting and upper abdominal pain.  Reports the abdominal pain is sharp and stabbing in feels like a \"band like\" feeling around her upper abdomen.  Cannot pinpoint triggers.  No alleviating factors.  She has been on pantoprazole and Zofran with no help.  Reports that she did have an episode of hematemesis.  She thinks she may have vomited a \"blood clot.\"    BMs are brown and formed daily.  Denies any melena hematochezia.  No weight loss.  No recent travel, antibiotic use or sick contacts.  No new medications.    Previous EGD/colonoscopy        History obtained from: patient    Review of Systems   Gastrointestinal:  Positive for abdominal pain, nausea and vomiting (With hematemesis x 1).   All other systems reviewed and are negative.    Past Medical History   Past Medical History:   Diagnosis Date    Varicella      Past Surgical History:   Procedure Laterality Date    EYE SURGERY  2000     Family History   Problem Relation Age of Onset    Hypertension Mother     Heart disease Father     No Known Problems Brother     No Known Problems Maternal Grandmother     Cancer Maternal Grandfather     Stroke Maternal Grandfather     Stroke Paternal Grandmother     Breast cancer Paternal Grandmother     Cancer Paternal " Grandmother     Diabetes Paternal Grandmother     Thyroid disease Paternal Grandmother     Lung cancer Paternal Grandfather     Cancer Paternal Grandfather     No Known Problems Half-Sister     No Known Problems Half-Sister     Breast cancer Maternal Aunt     Colon cancer Neg Hx     Ovarian cancer Neg Hx       reports that she has never smoked. She has never used smokeless tobacco. She reports that she does not currently use alcohol. She reports that she does not currently use drugs.  Current Outpatient Medications on File Prior to Visit   Medication Sig Dispense Refill    aluminum-magnesium hydroxide-simethicone (MAALOX) 200-200-20 MG/5ML SUSP Take 30 mL by mouth 4 (four) times a day (before meals and at bedtime) 3600 mL 0    desogestrel-ethinyl estradiol (KARIVA) 0.15-0.02/0.01 MG (21/5) per tablet Take 1 tablet by mouth daily 90 tablet 1    ondansetron (ZOFRAN) 4 mg tablet Take 1 tablet (4 mg total) by mouth every 6 (six) hours 12 tablet 0    pantoprazole (PROTONIX) 40 mg tablet Take 1 tablet (40 mg total) by mouth daily for 20 days 20 tablet 0     No current facility-administered medications on file prior to visit.   No Known Allergies   Medical History Reviewed by provider this encounter:  Tobacco  Allergies  Meds  Problems  Med Hx  Surg Hx  Fam Hx     .  Current Outpatient Medications on File Prior to Visit   Medication Sig Dispense Refill    aluminum-magnesium hydroxide-simethicone (MAALOX) 200-200-20 MG/5ML SUSP Take 30 mL by mouth 4 (four) times a day (before meals and at bedtime) 3600 mL 0    desogestrel-ethinyl estradiol (KARIVA) 0.15-0.02/0.01 MG (21/5) per tablet Take 1 tablet by mouth daily 90 tablet 1    ondansetron (ZOFRAN) 4 mg tablet Take 1 tablet (4 mg total) by mouth every 6 (six) hours 12 tablet 0    pantoprazole (PROTONIX) 40 mg tablet Take 1 tablet (40 mg total) by mouth daily for 20 days 20 tablet 0     No current facility-administered medications on file prior to visit.      Social  History     Tobacco Use    Smoking status: Never    Smokeless tobacco: Never   Vaping Use    Vaping status: Never Used   Substance and Sexual Activity    Alcohol use: Not Currently    Drug use: Not Currently     Comment: denies in past 2 years    Sexual activity: Not Currently     Partners: Male     Birth control/protection: None        Objective   There were no vitals taken for this visit.     Physical Exam  Vitals reviewed.   Constitutional:       Appearance: Normal appearance.   Cardiovascular:      Rate and Rhythm: Normal rate.      Heart sounds: Normal heart sounds.   Pulmonary:      Effort: Pulmonary effort is normal.      Breath sounds: Normal breath sounds.   Abdominal:      General: Bowel sounds are normal.      Palpations: Abdomen is soft.      Tenderness: There is abdominal tenderness (Epigastric region). There is no guarding or rebound.   Neurological:      Mental Status: She is alert and oriented to person, place, and time.

## 2024-11-26 NOTE — H&P (VIEW-ONLY)
"Name: Edwige Jessica      : 2000      MRN: [id number]  Encounter Provider: MAURO Santos  Encounter Date: 2024   Encounter department: [facility]  :  Assessment & Plan  Abdominal pain, unspecified abdominal location  Reports symptoms started approximately 2 months ago.  No aggravating or relieving factors.  No marijuana use.  Occasional caffeine use.  Rare NSAID use.  Rare alcohol use.  Reports she gets episodes that last for approximately 5 hours of nausea, vomiting and upper abdominal pain.  Reports the abdominal pain is sharp and stabbing in feels like a \"band like\" feeling around her upper abdomen.  Cannot pinpoint triggers.  No alleviating factors.  She has been on pantoprazole and Zofran with no help.  Reports that she did have an episode of hematemesis.  She thinks she may have vomited a \"blood clot.\"  Will rule out PUD, gastritis/esophagitis, celiac disease and H. pylori.  She stopped the pantoprazole and ondansetron because it did not help.    Orders:    Ambulatory Referral to Gastroenterology    NM hepatobiliary w rx; Future    EGD; Future    Tissue Transglutaminase, IgA; Future    IgA; Future  -Antireflux diet  Nausea and vomiting, unspecified vomiting type  Refer above  Orders:    EGD; Future    Tissue Transglutaminase, IgA; Future    IgA; Future    Hematemesis with nausea  Had 1 episode of hematemesis.  Patient cannot quantify the amount of blood.  Reports she did vomit a \"blood clot\" once.       I reviewed with patient/family potential risks of endoscopic evaluation including possible infection, bleeding or perforation.  Patient/family verbalized understanding of potential risks and agreed to procedure(s).    History of Present Illness     HPI  Edwige Jessica is a 24 y.o. female who presents nausea, vomiting, hematemesis and abdominal pain.  She has a past medical history of HTN.  Presented to the ER 2024 with on and " "off abdominal pain for 1 month.  Radiates to her back.  Then developed vomiting.  Reports some \"blood\" in her vomit.  Patient unable to quantify how much blood.  Reports she threw up a clot that was very tiny.  Reports abdominal pain is a cramping sharp stabbing abdominal pain that she would describe a 5/10.  CT abdomen pelvis in the ER was normal.  CBC and CMP were normal other than WBCs of 10.3.  She was sent home pantoprazole 40 mg daily and Zofran as needed.    Reports symptoms started approximately 2 months ago.  No aggravating or relieving factors.  No marijuana use.  Occasional caffeine use.  Rare NSAID use.  Rare alcohol use.  Reports she gets episodes that last for approximately 5 hours of nausea, vomiting and upper abdominal pain.  Reports the abdominal pain is sharp and stabbing in feels like a \"band like\" feeling around her upper abdomen.  Cannot pinpoint triggers.  No alleviating factors.  She has been on pantoprazole and Zofran with no help.  Reports that she did have an episode of hematemesis.  She thinks she may have vomited a \"blood clot.\"    BMs are brown and formed daily.  Denies any melena hematochezia.  No weight loss.  No recent travel, antibiotic use or sick contacts.  No new medications.    Previous EGD/colonoscopy        History obtained from: patient    Review of Systems   Gastrointestinal:  Positive for abdominal pain, nausea and vomiting (With hematemesis x 1).   All other systems reviewed and are negative.    Past Medical History   Past Medical History:   Diagnosis Date    Varicella      Past Surgical History:   Procedure Laterality Date    EYE SURGERY  2000     Family History   Problem Relation Age of Onset    Hypertension Mother     Heart disease Father     No Known Problems Brother     No Known Problems Maternal Grandmother     Cancer Maternal Grandfather     Stroke Maternal Grandfather     Stroke Paternal Grandmother     Breast cancer Paternal Grandmother     Cancer Paternal " Grandmother     Diabetes Paternal Grandmother     Thyroid disease Paternal Grandmother     Lung cancer Paternal Grandfather     Cancer Paternal Grandfather     No Known Problems Half-Sister     No Known Problems Half-Sister     Breast cancer Maternal Aunt     Colon cancer Neg Hx     Ovarian cancer Neg Hx       reports that she has never smoked. She has never used smokeless tobacco. She reports that she does not currently use alcohol. She reports that she does not currently use drugs.  Current Outpatient Medications on File Prior to Visit   Medication Sig Dispense Refill    aluminum-magnesium hydroxide-simethicone (MAALOX) 200-200-20 MG/5ML SUSP Take 30 mL by mouth 4 (four) times a day (before meals and at bedtime) 3600 mL 0    desogestrel-ethinyl estradiol (KARIVA) 0.15-0.02/0.01 MG (21/5) per tablet Take 1 tablet by mouth daily 90 tablet 1    ondansetron (ZOFRAN) 4 mg tablet Take 1 tablet (4 mg total) by mouth every 6 (six) hours 12 tablet 0    pantoprazole (PROTONIX) 40 mg tablet Take 1 tablet (40 mg total) by mouth daily for 20 days 20 tablet 0     No current facility-administered medications on file prior to visit.   No Known Allergies   Medical History Reviewed by provider this encounter:  Tobacco  Allergies  Meds  Problems  Med Hx  Surg Hx  Fam Hx     .  Current Outpatient Medications on File Prior to Visit   Medication Sig Dispense Refill    aluminum-magnesium hydroxide-simethicone (MAALOX) 200-200-20 MG/5ML SUSP Take 30 mL by mouth 4 (four) times a day (before meals and at bedtime) 3600 mL 0    desogestrel-ethinyl estradiol (KARIVA) 0.15-0.02/0.01 MG (21/5) per tablet Take 1 tablet by mouth daily 90 tablet 1    ondansetron (ZOFRAN) 4 mg tablet Take 1 tablet (4 mg total) by mouth every 6 (six) hours 12 tablet 0    pantoprazole (PROTONIX) 40 mg tablet Take 1 tablet (40 mg total) by mouth daily for 20 days 20 tablet 0     No current facility-administered medications on file prior to visit.      Social  History     Tobacco Use    Smoking status: Never    Smokeless tobacco: Never   Vaping Use    Vaping status: Never Used   Substance and Sexual Activity    Alcohol use: Not Currently    Drug use: Not Currently     Comment: denies in past 2 years    Sexual activity: Not Currently     Partners: Male     Birth control/protection: None        Objective   There were no vitals taken for this visit.     Physical Exam  Vitals reviewed.   Constitutional:       Appearance: Normal appearance.   Cardiovascular:      Rate and Rhythm: Normal rate.      Heart sounds: Normal heart sounds.   Pulmonary:      Effort: Pulmonary effort is normal.      Breath sounds: Normal breath sounds.   Abdominal:      General: Bowel sounds are normal.      Palpations: Abdomen is soft.      Tenderness: There is abdominal tenderness (Epigastric region). There is no guarding or rebound.   Neurological:      Mental Status: She is alert and oriented to person, place, and time.

## 2024-12-01 ENCOUNTER — OFFICE VISIT (OUTPATIENT)
Dept: URGENT CARE | Facility: MEDICAL CENTER | Age: 24
End: 2024-12-01
Payer: COMMERCIAL

## 2024-12-01 VITALS
DIASTOLIC BLOOD PRESSURE: 78 MMHG | RESPIRATION RATE: 18 BRPM | TEMPERATURE: 98.5 F | HEART RATE: 104 BPM | OXYGEN SATURATION: 98 % | SYSTOLIC BLOOD PRESSURE: 142 MMHG

## 2024-12-01 DIAGNOSIS — R10.9 ABDOMINAL PAIN, UNSPECIFIED ABDOMINAL LOCATION: Primary | ICD-10-CM

## 2024-12-01 DIAGNOSIS — N39.0 URINARY TRACT INFECTION WITH HEMATURIA, SITE UNSPECIFIED: ICD-10-CM

## 2024-12-01 DIAGNOSIS — R31.9 URINARY TRACT INFECTION WITH HEMATURIA, SITE UNSPECIFIED: ICD-10-CM

## 2024-12-01 LAB
SL AMB  POCT GLUCOSE, UA: ABNORMAL
SL AMB LEUKOCYTE ESTERASE,UA: ABNORMAL
SL AMB POCT BILIRUBIN,UA: ABNORMAL
SL AMB POCT BLOOD,UA: ABNORMAL
SL AMB POCT CLARITY,UA: CLEAR
SL AMB POCT COLOR,UA: ABNORMAL
SL AMB POCT KETONES,UA: ABNORMAL
SL AMB POCT NITRITE,UA: ABNORMAL
SL AMB POCT PH,UA: 6.5
SL AMB POCT SPECIFIC GRAVITY,UA: 1
SL AMB POCT URINE HCG: NORMAL
SL AMB POCT URINE PROTEIN: ABNORMAL
SL AMB POCT UROBILINOGEN: 2

## 2024-12-01 PROCEDURE — 81002 URINALYSIS NONAUTO W/O SCOPE: CPT | Performed by: PHYSICIAN ASSISTANT

## 2024-12-01 PROCEDURE — 87086 URINE CULTURE/COLONY COUNT: CPT | Performed by: PHYSICIAN ASSISTANT

## 2024-12-01 PROCEDURE — 99213 OFFICE O/P EST LOW 20 MIN: CPT | Performed by: PHYSICIAN ASSISTANT

## 2024-12-01 PROCEDURE — 81025 URINE PREGNANCY TEST: CPT | Performed by: PHYSICIAN ASSISTANT

## 2024-12-01 RX ORDER — CEPHALEXIN 500 MG/1
500 CAPSULE ORAL EVERY 8 HOURS SCHEDULED
Qty: 30 CAPSULE | Refills: 0 | Status: SHIPPED | OUTPATIENT
Start: 2024-12-01 | End: 2024-12-11

## 2024-12-01 NOTE — PROGRESS NOTES
[facility]        NAME: Edwige Jessica is a 24 y.o. female  : 2000    MRN: [id number]  DATE: 2024  TIME: 12:24 PM    /78   Pulse 104   Temp 98.5 °F (36.9 °C)   Resp 18   SpO2 98%     Assessment and Plan   Abdominal pain, unspecified abdominal location [R10.9]  1. Abdominal pain, unspecified abdominal location  POCT urine dip    POCT urine HCG    Urine culture    cephalexin (KEFLEX) 500 mg capsule      2. Urinary tract infection with hematuria, site unspecified  cephalexin (KEFLEX) 500 mg capsule            Patient Instructions       Follow up with PCP in 3-5 days.  Proceed to  ER if symptoms worsen.    Chief Complaint     Chief Complaint   Patient presents with    Abdominal Pain     Patient continues with Abd attacks. She reports that she has not has normal bowl movements and is concerned for a blockage. She noted being seen in the ER and GI with an EGD scheduled for 2024.          History of Present Illness       Pt with abdomen pain intermittent severe/sharp in nature for several weeks  last episode of severe pain was 4 days ago    Abdominal Pain        Review of Systems   Review of Systems   Constitutional: Negative.    HENT: Negative.     Eyes: Negative.    Respiratory: Negative.     Cardiovascular: Negative.    Gastrointestinal:  Positive for abdominal pain.   Endocrine: Negative.    Genitourinary: Negative.    Musculoskeletal: Negative.    Skin: Negative.    Allergic/Immunologic: Negative.    Neurological: Negative.    Hematological: Negative.    Psychiatric/Behavioral: Negative.     All other systems reviewed and are negative.        Current Medications       Current Outpatient Medications:     cephalexin (KEFLEX) 500 mg capsule, Take 1 capsule (500 mg total) by mouth every 8 (eight) hours for 10 days, Disp: 30 capsule, Rfl: 0    aluminum-magnesium hydroxide-simethicone (MAALOX) 200-200-20 MG/5ML SUSP, Take 30 mL by mouth 4 (four) times a day (before meals  and at bedtime) (Patient not taking: Reported on 11/26/2024), Disp: 3600 mL, Rfl: 0    desogestrel-ethinyl estradiol (KARIVA) 0.15-0.02/0.01 MG (21/5) per tablet, Take 1 tablet by mouth daily (Patient not taking: Reported on 11/26/2024), Disp: 90 tablet, Rfl: 1    ondansetron (ZOFRAN) 4 mg tablet, Take 1 tablet (4 mg total) by mouth every 6 (six) hours, Disp: 12 tablet, Rfl: 0    pantoprazole (PROTONIX) 40 mg tablet, Take 1 tablet (40 mg total) by mouth daily for 20 days, Disp: 20 tablet, Rfl: 0    Current Allergies     Allergies as of 12/01/2024    (No Known Allergies)            The following portions of the patient's history were reviewed and updated as appropriate: allergies, current medications, past family history, past medical history, past social history, past surgical history and problem list.     Past Medical History:   Diagnosis Date    Varicella        Past Surgical History:   Procedure Laterality Date    EYE SURGERY  2000       Family History   Problem Relation Age of Onset    Hypertension Mother     Heart disease Father     No Known Problems Brother     No Known Problems Maternal Grandmother     Cancer Maternal Grandfather     Stroke Maternal Grandfather     Stroke Paternal Grandmother     Breast cancer Paternal Grandmother     Cancer Paternal Grandmother     Diabetes Paternal Grandmother     Thyroid disease Paternal Grandmother     Lung cancer Paternal Grandfather     Cancer Paternal Grandfather     No Known Problems Half-Sister     No Known Problems Half-Sister     Breast cancer Maternal Aunt     Colon cancer Neg Hx     Ovarian cancer Neg Hx          Medications have been verified.        Objective   /78   Pulse 104   Temp 98.5 °F (36.9 °C)   Resp 18   SpO2 98%        Physical Exam     Physical Exam  Vitals and nursing note reviewed.   Constitutional:       Appearance: Normal appearance. She is normal weight.      Comments: Pt states she has testing set up from her gi specialist  Pt states  she was at er for same and was referred to gi specialits  Discussed with pt if have severe abdomen pain to go to er for further evaluation   discussed with pt  that we are unable to order any labs or ultrasound or ct scan here in urgent care, suggested to pt if pain becomes severe again to go to er fo further evaluation  urine dip shows uti, I would give antibiotics for same but would not expect the uti to cause severe abdomen godwin     Discussed in detail with pt about concerns of severe intermittent abdomen pain , with inability to order any testing or blood eval in urgent care, discussed not possible for reason of pain to be elucitdated in urgent care,  if pain returns and is severe,  instructed pt to go to er for proper evaluation with blood eval and possible imaging   HENT:      Head: Normocephalic and atraumatic.      Right Ear: Tympanic membrane, ear canal and external ear normal.      Left Ear: Tympanic membrane, ear canal and external ear normal.   Cardiovascular:      Rate and Rhythm: Normal rate and regular rhythm.      Pulses: Normal pulses.      Heart sounds: Normal heart sounds.   Pulmonary:      Effort: Pulmonary effort is normal.      Breath sounds: Normal breath sounds.   Abdominal:      Palpations: Abdomen is soft.      Comments: Mid epigastric tenderness   when its severe will radiate some into back and luq    Musculoskeletal:         General: Normal range of motion.      Cervical back: Normal range of motion and neck supple.   Skin:     General: Skin is warm.   Neurological:      Mental Status: She is alert and oriented to person, place, and time.   Psychiatric:         Behavior: Behavior normal.                      1.68

## 2024-12-03 ENCOUNTER — APPOINTMENT (OUTPATIENT)
Dept: LAB | Facility: HOSPITAL | Age: 24
End: 2024-12-03
Payer: COMMERCIAL

## 2024-12-03 ENCOUNTER — RESULTS FOLLOW-UP (OUTPATIENT)
Dept: URGENT CARE | Facility: MEDICAL CENTER | Age: 24
End: 2024-12-03

## 2024-12-03 ENCOUNTER — TELEPHONE (OUTPATIENT)
Age: 24
End: 2024-12-03

## 2024-12-03 DIAGNOSIS — R11.2 NAUSEA AND VOMITING, UNSPECIFIED VOMITING TYPE: ICD-10-CM

## 2024-12-03 DIAGNOSIS — R10.9 ABDOMINAL PAIN, UNSPECIFIED ABDOMINAL LOCATION: ICD-10-CM

## 2024-12-03 LAB
BACTERIA UR CULT: NORMAL
IGA SERPL-MCNC: 184 MG/DL (ref 66–433)

## 2024-12-03 PROCEDURE — 86364 TISS TRNSGLTMNASE EA IG CLAS: CPT

## 2024-12-03 PROCEDURE — 36415 COLL VENOUS BLD VENIPUNCTURE: CPT

## 2024-12-03 PROCEDURE — 82784 ASSAY IGA/IGD/IGG/IGM EACH: CPT

## 2024-12-03 NOTE — TELEPHONE ENCOUNTER
Spoke with patient regarding UC results, discussed no UTI, stop abx, follow up with PCP if symptoms continue

## 2024-12-04 ENCOUNTER — RESULTS FOLLOW-UP (OUTPATIENT)
Dept: GASTROENTEROLOGY | Facility: MEDICAL CENTER | Age: 24
End: 2024-12-04

## 2024-12-04 LAB
TTG IGA SER-ACNC: <0.5 U/ML
TTG IGA SER-ACNC: NEGATIVE

## 2024-12-06 ENCOUNTER — TELEPHONE (OUTPATIENT)
Age: 24
End: 2024-12-06

## 2024-12-06 ENCOUNTER — HOSPITAL ENCOUNTER (OUTPATIENT)
Dept: NUCLEAR MEDICINE | Facility: HOSPITAL | Age: 24
Discharge: HOME | End: 2024-12-06
Payer: COMMERCIAL

## 2024-12-06 DIAGNOSIS — K82.8 BILIARY DYSKINESIA: Primary | ICD-10-CM

## 2024-12-06 DIAGNOSIS — R10.9 ABDOMINAL PAIN, UNSPECIFIED ABDOMINAL LOCATION: ICD-10-CM

## 2024-12-06 PROCEDURE — A9537 TC99M MEBROFENIN: HCPCS

## 2024-12-06 PROCEDURE — 78227 HEPATOBIL SYST IMAGE W/DRUG: CPT

## 2024-12-06 RX ORDER — SINCALIDE 5 UG/5ML
0.02 INJECTION, POWDER, LYOPHILIZED, FOR SOLUTION INTRAVENOUS
Status: COMPLETED | OUTPATIENT
Start: 2024-12-06 | End: 2024-12-06

## 2024-12-06 RX ADMIN — SINCALIDE 2.1 MCG: 5 INJECTION, POWDER, LYOPHILIZED, FOR SOLUTION INTRAVENOUS at 13:39

## 2024-12-13 ENCOUNTER — ANESTHESIA (OUTPATIENT)
Dept: ANESTHESIOLOGY | Facility: HOSPITAL | Age: 24
End: 2024-12-13

## 2024-12-13 ENCOUNTER — ANESTHESIA EVENT (OUTPATIENT)
Dept: ANESTHESIOLOGY | Facility: HOSPITAL | Age: 24
End: 2024-12-13

## 2024-12-16 ENCOUNTER — TELEPHONE (OUTPATIENT)
Dept: GASTROENTEROLOGY | Facility: MEDICAL CENTER | Age: 24
End: 2024-12-16

## 2024-12-16 ENCOUNTER — ANESTHESIA EVENT (OUTPATIENT)
Dept: GASTROENTEROLOGY | Facility: MEDICAL CENTER | Age: 24
End: 2024-12-16
Payer: COMMERCIAL

## 2024-12-16 ENCOUNTER — HOSPITAL ENCOUNTER (OUTPATIENT)
Dept: GASTROENTEROLOGY | Facility: MEDICAL CENTER | Age: 24
Setting detail: OUTPATIENT SURGERY
Discharge: HOME | End: 2024-12-16
Payer: COMMERCIAL

## 2024-12-16 ENCOUNTER — ANESTHESIA (OUTPATIENT)
Dept: GASTROENTEROLOGY | Facility: MEDICAL CENTER | Age: 24
End: 2024-12-16
Payer: COMMERCIAL

## 2024-12-16 VITALS
HEART RATE: 71 BPM | DIASTOLIC BLOOD PRESSURE: 74 MMHG | OXYGEN SATURATION: 97 % | RESPIRATION RATE: 18 BRPM | TEMPERATURE: 97.3 F | SYSTOLIC BLOOD PRESSURE: 128 MMHG

## 2024-12-16 DIAGNOSIS — R10.9 ABDOMINAL PAIN, UNSPECIFIED ABDOMINAL LOCATION: ICD-10-CM

## 2024-12-16 DIAGNOSIS — R11.2 NAUSEA AND VOMITING, UNSPECIFIED VOMITING TYPE: ICD-10-CM

## 2024-12-16 LAB
EXT PREGNANCY TEST URINE: NEGATIVE
EXT. CONTROL: NORMAL

## 2024-12-16 PROCEDURE — 88305 TISSUE EXAM BY PATHOLOGIST: CPT | Performed by: SPECIALIST

## 2024-12-16 PROCEDURE — 43239 EGD BIOPSY SINGLE/MULTIPLE: CPT | Performed by: INTERNAL MEDICINE

## 2024-12-16 PROCEDURE — 81025 URINE PREGNANCY TEST: CPT | Performed by: ANESTHESIOLOGY

## 2024-12-16 RX ORDER — SODIUM CHLORIDE 9 MG/ML
125 INJECTION, SOLUTION INTRAVENOUS CONTINUOUS
Status: CANCELLED | OUTPATIENT
Start: 2024-12-16

## 2024-12-16 RX ORDER — SODIUM CHLORIDE, SODIUM LACTATE, POTASSIUM CHLORIDE, CALCIUM CHLORIDE 600; 310; 30; 20 MG/100ML; MG/100ML; MG/100ML; MG/100ML
125 INJECTION, SOLUTION INTRAVENOUS CONTINUOUS
Status: DISCONTINUED | OUTPATIENT
Start: 2024-12-16 | End: 2024-12-20 | Stop reason: HOSPADM

## 2024-12-16 RX ORDER — PROPOFOL 10 MG/ML
INJECTION, EMULSION INTRAVENOUS AS NEEDED
Status: DISCONTINUED | OUTPATIENT
Start: 2024-12-16 | End: 2024-12-16

## 2024-12-16 RX ORDER — SODIUM CHLORIDE 9 MG/ML
125 INJECTION, SOLUTION INTRAVENOUS CONTINUOUS
Status: DISCONTINUED | OUTPATIENT
Start: 2024-12-16 | End: 2024-12-20 | Stop reason: HOSPADM

## 2024-12-16 RX ADMIN — SODIUM CHLORIDE 125 ML/HR: 0.9 INJECTION, SOLUTION INTRAVENOUS at 13:46

## 2024-12-16 RX ADMIN — PROPOFOL 200 MG: 10 INJECTION, EMULSION INTRAVENOUS at 13:53

## 2024-12-16 RX ADMIN — PROPOFOL 50 MG: 10 INJECTION, EMULSION INTRAVENOUS at 13:59

## 2024-12-16 RX ADMIN — PROPOFOL 100 MG: 10 INJECTION, EMULSION INTRAVENOUS at 13:57

## 2024-12-16 RX ADMIN — PROPOFOL 50 MG: 10 INJECTION, EMULSION INTRAVENOUS at 14:00

## 2024-12-16 NOTE — ANESTHESIA POSTPROCEDURE EVALUATION
Post-Op Assessment Note    CV Status:  Stable    Pain management: adequate       Mental Status:  Somnolent     Post Op Vitals Reviewed: Yes    No anethesia notable event occurred.    Staff: CRNA           Last Filed PACU Vitals:  Vitals Value Taken Time   Temp     Pulse     BP     Resp     SpO2         Modified Alissa:  Activity: 2 (12/16/2024  1:29 PM)  Respiration: 2 (12/16/2024  1:29 PM)  Circulation: 2 (12/16/2024  1:29 PM)  Consciousness: 2 (12/16/2024  1:29 PM)  Oxygen Saturation: 2 (12/16/2024  1:29 PM)  Modified Alissa Score: 10 (12/16/2024  1:29 PM)          PLEASE SCHEDULE THE FOLLOWING SURGERY:     Remove Cataract with phacoemulsification and insertion of intraocular lens implant (81642), BOTH EYES  DIAGNOSIS:  Cataract  H26.9  Surgery scheduling requirements include:  Facility: [facility]  I put in orders for medical preop  Admission Type: OUTPATIENT  Anesthesia: REGIONAL WITH MAC  Length of Procedure: 30 minutes  Date: both eyes, right eye first  Medical Preop: YES WITH PMD (primary medical doctor)   A-Scan: needed  Postoperative: FOR FIRST EYE:  POSTOP DAY 1 AT [facility] WITH ME  FOR SECOND EYE:  POSTOP DAY 1 AT [facility] AND 4-5 WEEKS AT [facility] WITH ME;   NO ONE WEEK VISIT

## 2024-12-16 NOTE — ANESTHESIA PREPROCEDURE EVALUATION
"Procedure:  EGD     - denies any chest pain, palpitations, shortness of breath, syncope, lightheadedness, seizures   - denies any recent infectious symptoms such as fevers, chills, cough   - denies taking any anticoagulation medications or any issues with bleeding, bruising, clotting    Relevant Problems   ANESTHESIA (within normal limits)      CARDIO (within normal limits)      ENDO (within normal limits)      GI/HEPATIC (within normal limits)      /RENAL (within normal limits)      GYN (within normal limits)      HEMATOLOGY (within normal limits)      MUSCULOSKELETAL (within normal limits)      NEURO/PSYCH (within normal limits)      PULMONARY (within normal limits)      Lab Results   Component Value Date    WBC 10.39 (H) 11/14/2024    HGB 13.1 11/14/2024    HCT 39.6 11/14/2024    MCV 85 11/14/2024     11/14/2024     Lab Results   Component Value Date    SODIUM 138 11/14/2024    K 3.8 11/14/2024     11/14/2024    CO2 24 11/14/2024    AGAP 9 11/14/2024    BUN 11 11/14/2024    CREATININE 0.83 11/14/2024    GLUC 99 11/14/2024    GLUF 92 08/21/2024    CALCIUM 8.8 11/14/2024    AST 11 (L) 11/14/2024    ALT 9 11/14/2024    ALKPHOS 63 11/14/2024    TP 6.9 11/14/2024    TBILI 0.45 11/14/2024    EGFR 98 11/14/2024     No results found for: \"PTT\"  No results found for: \"INR\", \"PROTIME\"      Physical Exam    Airway    Mallampati score: II  TM Distance: >3 FB  Neck ROM: full     Dental       Cardiovascular  Rhythm: regular, Rate: normal, Cardiovascular exam normal    Pulmonary  Pulmonary exam normal Breath sounds clear to auscultation    Other Findings  post-pubertal.      Anesthesia Plan  ASA Score- 2     Anesthesia Type- IV sedation with anesthesia with ASA Monitors.         Additional Monitors:     Airway Plan:            Plan Factors-Exercise tolerance (METS): >4 METS.    Chart reviewed. EKG reviewed.  Existing labs reviewed. Patient summary reviewed.    Patient is not a current smoker.  Patient did not " smoke on day of surgery.    Obstructive sleep apnea risk education given perioperatively.        Induction- intravenous.    Postoperative Plan-         Informed Consent- Anesthetic plan and risks discussed with patient.  I personally reviewed this patient with the CRNA. Discussed and agreed on the Anesthesia Plan with the CRNA..

## 2024-12-18 ENCOUNTER — RESULTS FOLLOW-UP (OUTPATIENT)
Dept: GASTROENTEROLOGY | Facility: CLINIC | Age: 24
End: 2024-12-18

## 2024-12-18 PROCEDURE — 88305 TISSUE EXAM BY PATHOLOGIST: CPT | Performed by: SPECIALIST

## 2025-05-29 ENCOUNTER — APPOINTMENT (OUTPATIENT)
Dept: LAB | Facility: HOSPITAL | Age: 25
End: 2025-05-29
Payer: COMMERCIAL

## 2025-05-29 ENCOUNTER — RESULTS FOLLOW-UP (OUTPATIENT)
Dept: OTHER | Facility: HOSPITAL | Age: 25
End: 2025-05-29

## 2025-05-29 ENCOUNTER — TELEMEDICINE (OUTPATIENT)
Dept: OTHER | Facility: HOSPITAL | Age: 25
End: 2025-05-29
Payer: COMMERCIAL

## 2025-05-29 DIAGNOSIS — N89.8 VAGINAL ITCHING: ICD-10-CM

## 2025-05-29 DIAGNOSIS — N89.8 VAGINAL ITCHING: Primary | ICD-10-CM

## 2025-05-29 DIAGNOSIS — N39.0 URINARY TRACT INFECTION WITHOUT HEMATURIA, SITE UNSPECIFIED: Primary | ICD-10-CM

## 2025-05-29 LAB
BACTERIA UR QL AUTO: ABNORMAL /HPF
BILIRUB UR QL STRIP: NEGATIVE
CLARITY UR: ABNORMAL
COLOR UR: ABNORMAL
GLUCOSE UR STRIP-MCNC: NEGATIVE MG/DL
HGB UR QL STRIP.AUTO: NEGATIVE
KETONES UR STRIP-MCNC: NEGATIVE MG/DL
LEUKOCYTE ESTERASE UR QL STRIP: ABNORMAL
MUCOUS THREADS UR QL AUTO: ABNORMAL
NITRITE UR QL STRIP: NEGATIVE
NON-SQ EPI CELLS URNS QL MICRO: ABNORMAL /HPF
PH UR STRIP.AUTO: 6.5 [PH]
PROT UR STRIP-MCNC: NEGATIVE MG/DL
RBC #/AREA URNS AUTO: ABNORMAL /HPF
SP GR UR STRIP.AUTO: 1.02 (ref 1–1.03)
UROBILINOGEN UR STRIP-ACNC: <2 MG/DL
WBC #/AREA URNS AUTO: ABNORMAL /HPF

## 2025-05-29 PROCEDURE — 81001 URINALYSIS AUTO W/SCOPE: CPT

## 2025-05-29 PROCEDURE — 99213 OFFICE O/P EST LOW 20 MIN: CPT | Performed by: NURSE PRACTITIONER

## 2025-05-29 PROCEDURE — 87086 URINE CULTURE/COLONY COUNT: CPT

## 2025-05-29 RX ORDER — CEPHALEXIN 500 MG/1
500 CAPSULE ORAL EVERY 12 HOURS SCHEDULED
Qty: 10 CAPSULE | Refills: 0 | Status: SHIPPED | OUTPATIENT
Start: 2025-05-29 | End: 2025-06-03

## 2025-05-29 RX ORDER — FLUCONAZOLE 150 MG/1
150 TABLET ORAL ONCE
Qty: 1 TABLET | Refills: 0 | Status: SHIPPED | OUTPATIENT
Start: 2025-05-29 | End: 2025-05-29

## 2025-05-29 NOTE — PATIENT INSTRUCTIONS
This sounds more like a yeast infection. Will start a diflucan.  Will do a UA and treat is there appears to be UTI.  Follow up with PCP.  Go to ER with any worsening symptoms.

## 2025-05-29 NOTE — PROGRESS NOTES
Virtual Regular Visit  Name: Edwige Jessica      : 2000      MRN: [id number]  Encounter Provider: MAURO Urrutia  Encounter Date: 2025   Encounter department: VIRTUAL CARE       Verification of patient location:  Patient is located at Home in the following state in which I hold an active license [place] :  Assessment & Plan  Vaginal itching    Orders:    fluconazole (DIFLUCAN) 150 mg tablet; Take 1 tablet (150 mg total) by mouth once for 1 dose    UA w Reflex to Microscopic w Reflex to Culture; Future        Encounter provider MAURO Urrutia    The patient was identified by name and date of birth. Edwige Jessica was informed that this is a telemedicine visit and that the visit is being conducted through the Epic Embedded platform. She agrees to proceed..  My office door was closed. No one else was in the room. She acknowledged consent and understanding of privacy and security of the video platform. The patient has agreed to participate and understands they can discontinue the visit at any time.    Patient is aware this is a billable service.     History obtained from: patient  History of Present Illness     This is a 25 year old female here today for video visit.  She has been having discomfort with urination for about about 1 week.  She has positive for leukocytes on home UTI test.  She has some frequency and urgency but has had this in the past to drinking water. She states there is itching externally   No concern with STD, no chance of pregnancy.         Review of Systems   Constitutional:  Negative for activity change, chills and fatigue.   Genitourinary:  Positive for dysuria, frequency and urgency.   Psychiatric/Behavioral: Negative.         Objective   There were no vitals taken for this visit.    Physical Exam  HENT:      Head: Normocephalic and atraumatic.   Pulmonary:      Effort: Pulmonary effort is normal. No respiratory distress.     Neurological:      Mental Status:  She is oriented to person, place, and time.     Psychiatric:         Mood and Affect: Mood normal.         Behavior: Behavior normal.         Thought Content: Thought content normal.         Judgment: Judgment normal.         Visit Time  Total Visit Duration: 5 minutes not including the time spent for establishing the audio/video connection.

## 2025-05-30 LAB — BACTERIA UR CULT: NORMAL

## 2025-06-17 ENCOUNTER — NURSE TRIAGE (OUTPATIENT)
Age: 25
End: 2025-06-17

## 2025-06-17 DIAGNOSIS — N92.6 MISSED MENSES: Primary | ICD-10-CM

## 2025-06-17 NOTE — TELEPHONE ENCOUNTER
"REASON FOR CONVERSATION: Pregnancy Test and Amenorrhea    SYMPTOMS: Positive pregnancy test yesterday. Unknown LMP.    OTHER HEALTH INFORMATION: Patient was taking Kariva OCP and stopped taking January 2025. Had to take emergency contraception in February. Patient believes LMP was at some time in February. Did not get menses in March. Had about 3 days of heavy VB in April, but different from her regular period. Patient states she has hx of regular cycles with periods about every 28 days. Denies VB currently. Reports only occasional mild abdominal cramping.    PROTOCOL DISPOSITION: See Within 2 Weeks in Office    CARE ADVICE PROVIDED: call back if VB returns, severe abdominal pain, worsening symptoms.     PRACTICE FOLLOW-UP: Patient needs D&V within 2 weeks. [facility]     Reason for Disposition   Pregnant    Answer Assessment - Initial Assessment Questions  1. LMP:  \"When did your last menstrual period begin?\"      Patient believes LMP was February. Had bleeding in April, but different from normal period. More heavy, but not as heavy as a period - lasted 3 days.   3. REGULARITY: \"How regular are your periods?\"      Usually lasts 4-5 days. Usually hx of regular periods, every 28 days.   4. PREGNANCY: \"Is there any chance you are pregnant?\" (e.g., unprotected intercourse, missed birth control pill, broken condom) \"Have you used a home pregnancy test?\"      Unplanned pregnancy. Pos pregnancy test yesterday  5. BREASTFEEDING: \"Are you breastfeeding?\"      Denies  6. BIRTH CONTROL PILLS: \"Are you taking birth control pills, or have you stopped recently?\"      Up until January was on OCP Kariva, emergency contraception in February  7. LONG-ACTING CONTRACEPTION: \"Has your doctor given you a shot to prevent pregnancy?\" (e.g., Depo-Provera injection) \"Do you have an intrauterine device (IUD)\".      Denies  8. CAUSE: \"What do you think caused the missed period?\" (e.g., stress, rapid " "weight loss, excessive exercise)      Pregnancy  9. OTHER SYMPTOMS: \"Do you have any other symptoms?\" (e.g., abdomen pain)      Denies further VB, fevers, chills. Mild abdominal cramping occasionally    Protocols used: Menstrual Period - Missed or Late-Adult-[place]    "

## 2025-06-24 ENCOUNTER — ULTRASOUND (OUTPATIENT)
Dept: OBGYN CLINIC | Facility: MEDICAL CENTER | Age: 25
End: 2025-06-24
Payer: COMMERCIAL

## 2025-06-24 VITALS
HEIGHT: 69 IN | DIASTOLIC BLOOD PRESSURE: 64 MMHG | SYSTOLIC BLOOD PRESSURE: 118 MMHG | WEIGHT: 225.5 LBS | BODY MASS INDEX: 33.4 KG/M2

## 2025-06-24 DIAGNOSIS — Z34.92 SECOND TRIMESTER PREGNANCY: ICD-10-CM

## 2025-06-24 DIAGNOSIS — Z32.01 POSITIVE URINE PREGNANCY TEST: Primary | ICD-10-CM

## 2025-06-24 PROCEDURE — 99213 OFFICE O/P EST LOW 20 MIN: CPT | Performed by: OBSTETRICS & GYNECOLOGY

## 2025-06-24 PROCEDURE — 76817 TRANSVAGINAL US OBSTETRIC: CPT | Performed by: OBSTETRICS & GYNECOLOGY

## 2025-06-24 NOTE — PROGRESS NOTES
Assessment  25 y.o.  presenting for amenorrhea/positive pregnancy test. Pregnancy confirmed, dating by US in setting of unknown LMP. To be formally set by [facility], however approx 21w4d today with working MATT 10/31/25.    Plan  Diagnoses and all orders for this visit:    Positive urine pregnancy test  -     AMB US OB 14 + weeks single or first gestation level 2    Second trimester pregnancy  - Prenatal vitamin  - Discussed and referred to [facility] for formal dating and anatomic US  - Prenatal Nursing Intake in 2 weeks  - Prenatal H&P in 4 weeks     ____________________________________________________________      Subjective   Edwige Zo Jessica is a 25 y.o.  with an unknown LMP (EST Feb vs April) presenting after positive pregnancy test. She is currently otherwise without complaint.    Patient notes that this pregnancy was unplanned. She was Kariva OCP and stopped use in , then used plan B in Feb. No menses in March but had bleeding in April that was different than her regular menses. Unsure if was her LMP or if was pregnant; UPT taken early  after started to feel movement. Fairly certain well into 2nd trimester by how she feels.       The following portions of the patient's history were reviewed and updated as appropriate: allergies, current medications, past medical history, past social history, past surgical history, and problem list.    Review of Systems  Review of Systems   Constitutional:  Negative for chills, fatigue and fever.   Respiratory:  Negative for shortness of breath.    Cardiovascular:  Negative for chest pain and palpitations.   Gastrointestinal:  Negative for abdominal distention, abdominal pain, constipation, diarrhea, nausea and vomiting.   Genitourinary:  Positive for menstrual problem and pelvic pain (pressure). Negative for dysuria, flank pain, frequency, vaginal bleeding, vaginal discharge and vaginal pain.   Neurological:  Negative for dizziness, light-headedness and headaches.  "         Objective  /64   Ht 5' 9\" (1.753 m)   Wt 102 kg (225 lb 8 oz)   LMP 02/05/2025 (Approximate)   BMI 33.30 kg/m²       Physical Exam:  Physical Exam  Constitutional:       General: She is not in acute distress.     Appearance: Normal appearance. She is not ill-appearing, toxic-appearing or diaphoretic.     Eyes:      General: No scleral icterus.        Right eye: No discharge.         Left eye: No discharge.      Conjunctiva/sclera: Conjunctivae normal.       Cardiovascular:      Rate and Rhythm: Normal rate.   Pulmonary:      Effort: Pulmonary effort is normal. No respiratory distress.   Abdominal:      General: There is distension (gravid, fundal height U+1cm).      Palpations: There is no mass.      Tenderness: There is no abdominal tenderness. There is no guarding or rebound.      Hernia: No hernia is present.     Musculoskeletal:         General: No swelling.     Skin:     General: Skin is warm and dry.      Coloration: Skin is not jaundiced or pale.      Findings: No bruising or erythema.     Neurological:      Mental Status: She is alert.     Psychiatric:         Mood and Affect: Mood normal.         Behavior: Behavior normal.         Thought Content: Thought content normal.         Judgment: Judgment normal.           "

## 2025-06-25 ENCOUNTER — TELEPHONE (OUTPATIENT)
Dept: OBGYN CLINIC | Facility: MEDICAL CENTER | Age: 25
End: 2025-06-25

## 2025-06-25 NOTE — TELEPHONE ENCOUNTER
Who called:STAFF     Is the patient Pregnant ? Yes   If so, How many weeks? 21 weeks     Reason for the Call: To reschedule her Ob Intake and her Initial ob     Action Taken: Pt only scheduled her Ob Intake       Outcome/Plan/ Recommendations:  Pt is already 21 weeks , I offered her 2 appt for tomorrow her Ob Intake virtual and her Initial ob but pt could not do any of those appt. Pt states she is ok waiting whenever for her Initial ob.

## 2025-06-25 NOTE — TELEPHONE ENCOUNTER
LVM for pt to call to set up new Ob intake appt.          ----- Message from Lisa TORRES sent at 6/25/2025 11:39 AM EDT -----  Regarding: OB intake  Hello!    Patient had OB intake scheduled for tomorrow which she cancelled via [employer]. No other visits scheduled. Patient currently 21w5d. Please reach out to patient to reschedule appointment. Thank you.

## 2025-06-25 NOTE — PATIENT INSTRUCTIONS
Congratulations! Please review our Pregnancy Essential Guide and [facility] L&D Virtual tour from our networks website.    [facility] Pregnancy Essentials Guide  [facility] ([email or web address])     [facility] - Virtual Tour ([employer])

## 2025-06-25 NOTE — PROGRESS NOTES
Telemedicine Consent  The patient was identified by name and date of birth and was informed that this is a virtual visit being conducted through a secure, HIPAA-complaint platform. I am in a private office space with the door closed. Patient acknowledged understanding of privacy.  She agrees to proceed and is aware that she may discontinue the visit at any time.    OB INTAKE INTERVIEW 2025    Patient is 25 y.o. who presents for OB intake at 21w5d.  The father of her baby (Ange) is involved in the pregnancy.      Patient's last menstrual period was 2025 (approximate).  Ultrasound: Measured 21 weeks 4 days on 25  Estimated Date of Delivery: 10/31/25 changed by dating ultrasound.    Signs/Symptoms of Pregnancy  Current pregnancy symptoms: mild fatigue  Constipation no  Headaches no  Cramping-mild/spotting no  PICA cravings no    Diabetes  If patient has 1 or more, please order early 1 hour GTT  History of GDM no  BMI >35 no  History of PCOS or current metformin use no  History of LGA/macrosomic infant (4000g/9lbs) no    If patient has 2 or more, please order early 1 hour GTT  BMI>30 yes  Patient's age 35 years or older as of estimated date of delivery (AMA) no  First degree relative with type 2 diabetes no  History of chronic HTN, hyperlipidemia, elevated A1C no  High risk race (, , ,  or ) no    Hypertension if you answer yes to any of the following, please order baseline preeclampsia labs (cbc, comprehensive metabolic panel, urine protein creatinine ratio, uric acid)  History of of chronic HTN no  History of gestational HTN yes  History of preeclampsia, eclampsia, or HELLP syndrome no  History of diabetes no  History of lupus, sjogrens syndrome, kidney disease no    Thyroid if yes order TSH with reflex T4  History of thyroid disease no    Bleeding Disorder or Hx of DVT-patient or first degree relative with history of. Order the  following if not done previously.   (Factor V, antithrombin III, prothrombin gene mutation, protein C and S Ag, lupus anticoagulant, anticardiolipin, beta-2 glycoprotein)   no    OB/GYN:  History of abnormal pap smear no       Date of last pap smear - never  History of HPV no  History of Herpes/HSV no  History of other STI (gonorrhea, chlamydia, trich) no  History of prior  yes  History of prior  no  History of  delivery prior to 36 weeks 6 days no  History of blood transfusion no  Ok for blood transfusion yes    Substance screening:  History of tobacco use no  Currently using tobacco no  Substance Use Screen Level - no risk    MRSA Screening:   Does the pt have a hx of MRSA? no    Immunizations:  History of Varicella Vaccination - as a child  Influenza vaccine given this season n/a  Discussed Tdap vaccine YES   COVID Vaccine NO - Declined    Genetic/M:  Do you or your partner have a history of any of the following in yourselves or first degree relatives?  Cystic fibrosis no  Spinal muscular atrophy no  Hemoglobinopathy/Sickle Cell/Thalassemia no  Fragile X Intellectual Disability no    Carrier screening completed with previous pregnancy.    Appointment for Anatomy scan at [facility] was done on .    Interview education  [facility] Pregnancy Essentials Book reviewed, discussed and attached to their AVS YES     Prenatal lab work scripts YES    Extra labs ordered: CMP, Protein/creatinine ratio urine, and Uric Acid    Aspirin/Preeclampsia Screen    Risk Level Risk Factor Recommendation   LOW Prior Uncomplicated full-term delivery yes No Aspirin recommendation        MODERATE Nulliparity no Recommend low-dose aspirin if     BMI>30 yes 2 or more moderate risk factors    Family History Preeclampsia (mother/sister) no     35yr old or greater as of MATT no     Black Race, Concern for SDOH/Low Socioeconomic no     IVF Pregnancy  no     Personal History Risks (low birth weight, prior adverse preg outcome,  >10yr preg interval) short interval pregnancies         HIGH History of Preeclampsia no Recommend low-dose aspirin if     Multifetal gestation no 1 or more high risk factors    Chronic HTN no     Type 1 or 2 Diabetes no     Renal Disease no     Autoimmune Disease  no      Contraindications to ASA therapy:  NSAID/ ASA allergy: no  Nasal polyps: no  Asthma with history of ASA induced bronchospasm: no  Relative contraindications:  History of GI bleed: no  Active peptic ulcer disease: no  Severe hepatic dysfunction: no    Patient advised to start taking aspirin by MFM. Will  today.    The patient has a history now or in prior pregnancy notable for:  X3, short interval pregnancy, gHTN.    Details that I feel the provider should be aware of: Edwige came in for her OB intake at 22 w 0 d. Patient c/o mild fatigue. Safe medications to take during pregnancy and warning signs reviewed. Prenatal panel, CMP, PC ratio and uric acid ordered. Patient had Anatomy scan .    PN1 visit scheduled. The patient was oriented to our practice, the navigator role, reviewed delivering physicians and [facility] for delivery. All questions were answered.    Interviewed by: Lisa TORRES RN

## 2025-06-25 NOTE — TELEPHONE ENCOUNTER
Who called:STAFF     Is the patient Pregnant ? Yes  If so, How many weeks?  21 wks    Reason for the Call: LVM to call back to schedule her Initial OB    Action Taken: LVM to call back    Outcome/Plan/ Recommendations: LVM to call back to schedule her Initial OB

## 2025-06-26 ENCOUNTER — ROUTINE PRENATAL (OUTPATIENT)
Dept: PERINATAL CARE | Facility: OTHER | Age: 25
End: 2025-06-26
Attending: OBSTETRICS & GYNECOLOGY
Payer: COMMERCIAL

## 2025-06-26 VITALS
DIASTOLIC BLOOD PRESSURE: 68 MMHG | WEIGHT: 224.8 LBS | BODY MASS INDEX: 33.3 KG/M2 | HEIGHT: 69 IN | HEART RATE: 89 BPM | SYSTOLIC BLOOD PRESSURE: 136 MMHG

## 2025-06-26 DIAGNOSIS — Z3A.22 22 WEEKS GESTATION OF PREGNANCY: ICD-10-CM

## 2025-06-26 DIAGNOSIS — Z34.92 SECOND TRIMESTER PREGNANCY: ICD-10-CM

## 2025-06-26 DIAGNOSIS — Z87.59 HISTORY OF GESTATIONAL HYPERTENSION: ICD-10-CM

## 2025-06-26 DIAGNOSIS — O09.899 SHORT INTERVAL BETWEEN PREGNANCIES AFFECTING PREGNANCY, ANTEPARTUM: ICD-10-CM

## 2025-06-26 DIAGNOSIS — Z3A.21 21 WEEKS GESTATION OF PREGNANCY: Primary | ICD-10-CM

## 2025-06-26 DIAGNOSIS — O99.210 OBESITY IN PREGNANCY, ANTEPARTUM: ICD-10-CM

## 2025-06-26 PROCEDURE — 99214 OFFICE O/P EST MOD 30 MIN: CPT

## 2025-06-26 PROCEDURE — 76817 TRANSVAGINAL US OBSTETRIC: CPT | Performed by: STUDENT IN AN ORGANIZED HEALTH CARE EDUCATION/TRAINING PROGRAM

## 2025-06-26 PROCEDURE — 76811 OB US DETAILED SNGL FETUS: CPT | Performed by: STUDENT IN AN ORGANIZED HEALTH CARE EDUCATION/TRAINING PROGRAM

## 2025-06-26 NOTE — LETTER
2025     Supriya Levi MD  [address] [place] [address]  [address] [place] [id number]    Patient: Edwige Jessica   YOB: 2000   Date of Visit: 2025       Dear Dr. Supriya Levi MD:    Thank you for referring Edwige Jessica to me for evaluation. Below are my notes for this consultation.    If you have questions, please do not hesitate to call me. I look forward to following your patient along with you.         Sincerely,        Janneth Johnson MD        CC: No Recipients    Elsie MAURO Hernandez  2025  2:15 PM  Sign when Signing Visit  CONSULTATION: MATERNAL-FETAL MEDICINE    Dear Supriya Levi MD  [address] [place], [place] [id number],    Thank you very much for your kind referral of patient Edwige Jessica for Maternal-Fetal Medicine consultation. As you know, Edwige is a 25 y.o.  at 22w0d presenting for consultation for detailed anatomy ultrasound and aneuploidy screening.  She has no complaints today.    Her antepartum course is significant for: short interval pregnancy, class I obesity, and history of gestational hypertension with 1st pregnancy.  Problem List[1]    Obstetric History:  OB History    Para Term  AB Living   3 2 2 0 0 2   SAB IAB Ectopic Multiple Live Births      0 2      # Outcome Date GA Lbr Cristiano/2nd Weight Sex Type Anes PTL Lv   3 Current            2 Term 24 40w3d / 00:08 3250 g (7 lb 2.6 oz) F Vag-Spont EPI N MOLLY   1 Term 23 39w5d / 00:06 3175 g (7 lb) F Vag-Spont EPI N MOLLY     Past Medical History:  Past Medical History[2]    Past Surgical History:  Past Surgical History[3]    Social History:   She denies current use of alcohol, drugs of abuse, tobacco, and marijuana products.   Partner: Ange, age 27, is a     Family History:  Family history was reviewed using an office screening tool, and is negative for congenital anomalies, genetic diseases, and thromboembolism in first  "degree relatives of this pregnancy. Family history is noncontributory.    Medications:  Current Medications[4]    Allergies:  No Known Allergies    Review of Systems:  Review of Systems   Respiratory:  Negative for shortness of breath.    Cardiovascular:  Negative for chest pain and palpitations.   Genitourinary:  Negative for pelvic pain, vaginal bleeding and vaginal pain.   Neurological:  Negative for dizziness and light-headedness.   All other systems reviewed and are negative.      Exam:  Vitals:   Vitals:    25 1244   BP: 136/68   Pulse: 89     On examination, she is awake, alert and oriented. Mood and affect are appropriate.  The remainder of her physical examination was deferred as she was here today for consultation and discussion.    Please refer to \"Imaging\" for ultrasound report from today's visit.     Recommendations are as follows:   Short interval between pregnancies affecting pregnancy, antepartum  Short interval between pregnancies (< 18 months from end of one pregnancy to beginning of next)  is associated with increased risk for  birth and low birthweight. A third trimester growth ultrasound is recommended.    Obesity in pregnancy, antepartum  Body mass index > 30 kg/m2 is associated with an increased risk of several pregnancy complications, including hypertensive disorders, diabetes, abnormal fetal growth, and fetal malformations. The risk of  delivery is also increased, as are wound complications in the event of  delivery. A healthy diet and exercise, as well as appropriate gestational weight gain (no more than 11-20 pounds) can help reduce risk of these complications. 150 minutes of moderate exercise per week is recommended for all pregnant women. Nutrition counseling is also available if desired.    History of gestational hypertension  Patient reports that she received a diagnosis of gestational hypertension during labor with her 1st child. Has had no BP issues " since. Reviewed signs and symptoms related to elevated blood pressure such as headache, visual changes, epigastric discomfort, shortness of breath, and chest pain, which she denies.    Aspirin  The use of low dose aspirin in pregnancy (162mg) is recommended in women with a high risk, or multiple moderate risk factors for preeclampsia.  Aspirin therapy should be initiated between 12-28 weeks gestation, and is most effective if started prior to 16 weeks gestation, and stopped by 36 weeks gestation.     Low dose aspirin in pregnancy has been shown to reduce the incidence of preeclampsia in women with risk factors, and has been shown to be safe and without significant maternal or fetal risk. In light of her risk factors (BMI, history GHTN), we recommend initiating aspirin therapy, which was prescribed today.    21 weeks gestation of pregnancy  Discussed today's ultrasound findings which show fetal growth concordant with dates. No malformations were detected. Although encouraging, even a normal-appearing ultrasound cannot exclude all malformations, or the possibility of a genetic syndrome. Also reviewed that her cervical length is normal, indicating low risk for  birth.     A growth ultrasound is recommended at 32 weeks for the indication of short interval pregnancy.     Also reviewed the availability of genetic screening, as well as diagnostic testing, which are available to all pregnant women. We reviewed limitations, risks, and benefits of screening and testing. She declined testing with Non Invasive Prenatal Screening (NIPS). She does not wish to pursue diagnostic testing at this time. Please offer carrier screening per ACOG guidelines.    Evaluation and Management:  MDM:  I. Diagnoses/Problems addressed: Short interval pregnancy, BMI, hx GHTN, aspirin  II.  Data: I reviewed notes from the patient's OB.  III.  Risk of morbidity: Moderate    Medical decision making for this encounter was  moderate.    Sincerely,    MAURO Esteban  Nurse Practitioner, Maternal-Fetal Medicine  [facility]         [1]   Patient Active Problem List  Diagnosis   • History of gestational hypertension   • Encounter for initial prescription of contraceptive pills   • Obesity in pregnancy, antepartum   • Short interval between pregnancies affecting pregnancy, antepartum   • 21 weeks gestation of pregnancy   [2]   Past Medical History:  Diagnosis Date   • Varicella    [3]   Past Surgical History:  Procedure Laterality Date   • EYE SURGERY  2000   [4]   Current Outpatient Medications:   •  Prenatal Vit-Fe Fumarate-FA (PRENATAL PO), Take by mouth, Disp: , Rfl:

## 2025-06-26 NOTE — ASSESSMENT & PLAN NOTE
Body mass index > 30 kg/m2 is associated with an increased risk of several pregnancy complications, including hypertensive disorders, diabetes, abnormal fetal growth, and fetal malformations. The risk of  delivery is also increased, as are wound complications in the event of  delivery. A healthy diet and exercise, as well as appropriate gestational weight gain (no more than 11-20 pounds) can help reduce risk of these complications. 150 minutes of moderate exercise per week is recommended for all pregnant women. Nutrition counseling is also available if desired.

## 2025-06-26 NOTE — TELEPHONE ENCOUNTER
CHRIS,  for her Ob Intake she refuse to come in person. So we offered her virtual.    I called pt to schedule her more of her ob visits, but she said she will call us back to schedule more appointments.

## 2025-06-26 NOTE — ASSESSMENT & PLAN NOTE
Patient reports that she received a diagnosis of gestational hypertension during labor with her 1st child. Has had no BP issues since. Reviewed signs and symptoms related to elevated blood pressure such as headache, visual changes, epigastric discomfort, shortness of breath, and chest pain, which she denies.    Aspirin  The use of low dose aspirin in pregnancy (162mg) is recommended in women with a high risk, or multiple moderate risk factors for preeclampsia.  Aspirin therapy should be initiated between 12-28 weeks gestation, and is most effective if started prior to 16 weeks gestation, and stopped by 36 weeks gestation.     Low dose aspirin in pregnancy has been shown to reduce the incidence of preeclampsia in women with risk factors, and has been shown to be safe and without significant maternal or fetal risk. In light of her risk factors (BMI, history GHTN), we recommend initiating aspirin therapy, which was prescribed today.

## 2025-06-26 NOTE — PROGRESS NOTES
CONSULTATION: MATERNAL-FETAL MEDICINE    Dear Supriya Levi MD  [address] [place] [address]  [address]  [place] [address],    Thank you very much for your kind referral of patient Edwige Jessica for Maternal-Fetal Medicine consultation. As you know, Edwige is a 25 y.o.  at 22w0d presenting for consultation for detailed anatomy ultrasound and aneuploidy screening.  She has no complaints today.    Her antepartum course is significant for: short interval pregnancy, class I obesity, and history of gestational hypertension with 1st pregnancy.  Problem List[1]    Obstetric History:  OB History    Para Term  AB Living   3 2 2 0 0 2   SAB IAB Ectopic Multiple Live Births      0 2      # Outcome Date GA Lbr Cristiano/2nd Weight Sex Type Anes PTL Lv   3 Current            2 Term 24 40w3d / 00:08 3250 g (7 lb 2.6 oz) F Vag-Spont EPI N MOLLY   1 Term 23 39w5d / 00:06 3175 g (7 lb) F Vag-Spont EPI N MOLLY     Past Medical History:  Past Medical History[2]    Past Surgical History:  Past Surgical History[3]    Social History:   She denies current use of alcohol, drugs of abuse, tobacco, and marijuana products.   Partner: Ange, age 27, is a     Family History:  Family history was reviewed using an office screening tool, and is negative for congenital anomalies, genetic diseases, and thromboembolism in first degree relatives of this pregnancy. Family history is noncontributory.    Medications:  Current Medications[4]    Allergies:  No Known Allergies    Review of Systems:  Review of Systems   Respiratory:  Negative for shortness of breath.    Cardiovascular:  Negative for chest pain and palpitations.   Genitourinary:  Negative for pelvic pain, vaginal bleeding and vaginal pain.   Neurological:  Negative for dizziness and light-headedness.   All other systems reviewed and are negative.      Exam:  Vitals:   Vitals:    25 1244   BP: 136/68   Pulse: 89     On examination, she is awake, alert  "and oriented. Mood and affect are appropriate.  The remainder of her physical examination was deferred as she was here today for consultation and discussion.    Please refer to \"Imaging\" for ultrasound report from today's visit.     Recommendations are as follows:   Short interval between pregnancies affecting pregnancy, antepartum  Short interval between pregnancies (< 18 months from end of one pregnancy to beginning of next)  is associated with increased risk for  birth and low birthweight. A third trimester growth ultrasound is recommended.    Obesity in pregnancy, antepartum  Body mass index > 30 kg/m2 is associated with an increased risk of several pregnancy complications, including hypertensive disorders, diabetes, abnormal fetal growth, and fetal malformations. The risk of  delivery is also increased, as are wound complications in the event of  delivery. A healthy diet and exercise, as well as appropriate gestational weight gain (no more than 11-20 pounds) can help reduce risk of these complications. 150 minutes of moderate exercise per week is recommended for all pregnant women. Nutrition counseling is also available if desired.    History of gestational hypertension  Patient reports that she received a diagnosis of gestational hypertension during labor with her 1st child. Has had no BP issues since. Reviewed signs and symptoms related to elevated blood pressure such as headache, visual changes, epigastric discomfort, shortness of breath, and chest pain, which she denies.    Aspirin  The use of low dose aspirin in pregnancy (162mg) is recommended in women with a high risk, or multiple moderate risk factors for preeclampsia.  Aspirin therapy should be initiated between 12-28 weeks gestation, and is most effective if started prior to 16 weeks gestation, and stopped by 36 weeks gestation.     Low dose aspirin in pregnancy has been shown to reduce the incidence of preeclampsia in women " with risk factors, and has been shown to be safe and without significant maternal or fetal risk. In light of her risk factors (BMI, history GHTN), we recommend initiating aspirin therapy, which was prescribed today.    21 weeks gestation of pregnancy  Discussed today's ultrasound findings which show fetal growth concordant with dates. No malformations were detected. Although encouraging, even a normal-appearing ultrasound cannot exclude all malformations, or the possibility of a genetic syndrome. Also reviewed that her cervical length is normal, indicating low risk for  birth.     A growth ultrasound is recommended at 32 weeks for the indication of short interval pregnancy.     Also reviewed the availability of genetic screening, as well as diagnostic testing, which are available to all pregnant women. We reviewed limitations, risks, and benefits of screening and testing. She declined testing with Non Invasive Prenatal Screening (NIPS). She does not wish to pursue diagnostic testing at this time. Please offer carrier screening per ACOG guidelines.    Evaluation and Management:  MDM:  I. Diagnoses/Problems addressed: Short interval pregnancy, BMI, hx GHTN, aspirin  II.  Data: I reviewed notes from the patient's OB.  III.  Risk of morbidity: Moderate    Medical decision making for this encounter was moderate.    Sincerely,    MAURO Esteban  Nurse Practitioner, Maternal-Fetal Medicine  [facility]         [1]   Patient Active Problem List  Diagnosis    History of gestational hypertension    Encounter for initial prescription of contraceptive pills    Obesity in pregnancy, antepartum    Short interval between pregnancies affecting pregnancy, antepartum    21 weeks gestation of pregnancy   [2]   Past Medical History:  Diagnosis Date    Varicella    [3]   Past Surgical History:  Procedure Laterality Date    EYE SURGERY     [4]   Current Outpatient Medications:     Prenatal  Vit-Fe Fumarate-FA (PRENATAL PO), Take by mouth, Disp: , Rfl:

## 2025-06-26 NOTE — ASSESSMENT & PLAN NOTE
Discussed today's ultrasound findings which show fetal growth concordant with dates. No malformations were detected. Although encouraging, even a normal-appearing ultrasound cannot exclude all malformations, or the possibility of a genetic syndrome. Also reviewed that her cervical length is normal, indicating low risk for  birth.     A growth ultrasound is recommended at 32 weeks for the indication of short interval pregnancy.     Also reviewed the availability of genetic screening, as well as diagnostic testing, which are available to all pregnant women. We reviewed limitations, risks, and benefits of screening and testing. She declined testing with Non Invasive Prenatal Screening (NIPS). She does not wish to pursue diagnostic testing at this time. Please offer carrier screening per ACOG guidelines.

## 2025-06-26 NOTE — ASSESSMENT & PLAN NOTE
Short interval between pregnancies (< 18 months from end of one pregnancy to beginning of next)  is associated with increased risk for  birth and low birthweight. A third trimester growth ultrasound is recommended.

## 2025-06-27 ENCOUNTER — INITIAL PRENATAL (OUTPATIENT)
Dept: OBGYN CLINIC | Facility: MEDICAL CENTER | Age: 25
End: 2025-06-27
Payer: COMMERCIAL

## 2025-06-27 ENCOUNTER — APPOINTMENT (OUTPATIENT)
Dept: LAB | Facility: HOSPITAL | Age: 25
End: 2025-06-27
Payer: COMMERCIAL

## 2025-06-27 DIAGNOSIS — Z3A.22 22 WEEKS GESTATION OF PREGNANCY: ICD-10-CM

## 2025-06-27 DIAGNOSIS — Z34.82 PRENATAL CARE, SUBSEQUENT PREGNANCY IN SECOND TRIMESTER: Primary | ICD-10-CM

## 2025-06-27 DIAGNOSIS — Z87.59 HISTORY OF GESTATIONAL HYPERTENSION: ICD-10-CM

## 2025-06-27 DIAGNOSIS — Z34.82 PRENATAL CARE, SUBSEQUENT PREGNANCY IN SECOND TRIMESTER: ICD-10-CM

## 2025-06-27 PROBLEM — Z30.011 ENCOUNTER FOR INITIAL PRESCRIPTION OF CONTRACEPTIVE PILLS: Status: RESOLVED | Noted: 2024-08-21 | Resolved: 2025-06-27

## 2025-06-27 PROBLEM — O09.32 LATE PRENATAL CARE AFFECTING PREGNANCY IN SECOND TRIMESTER: Status: ACTIVE | Noted: 2025-06-27

## 2025-06-27 PROBLEM — O09.892 PRIOR PREGNANCY COMPLICATED BY PIH, ANTEPARTUM, SECOND TRIMESTER: Status: ACTIVE | Noted: 2023-07-18

## 2025-06-27 LAB
ABO GROUP BLD: NORMAL
ALBUMIN SERPL BCG-MCNC: 3.7 G/DL (ref 3.5–5)
ALP SERPL-CCNC: 64 U/L (ref 34–104)
ALT SERPL W P-5'-P-CCNC: 5 U/L (ref 7–52)
ANION GAP SERPL CALCULATED.3IONS-SCNC: 9 MMOL/L (ref 4–13)
AST SERPL W P-5'-P-CCNC: 10 U/L (ref 13–39)
BACTERIA UR QL AUTO: ABNORMAL /HPF
BASOPHILS # BLD AUTO: 0.03 THOUSANDS/ÂΜL (ref 0–0.1)
BASOPHILS NFR BLD AUTO: 0 % (ref 0–1)
BILIRUB SERPL-MCNC: 0.47 MG/DL (ref 0.2–1)
BILIRUB UR QL STRIP: NEGATIVE
BLD GP AB SCN SERPL QL: NEGATIVE
BUN SERPL-MCNC: 5 MG/DL (ref 5–25)
CALCIUM SERPL-MCNC: 8.7 MG/DL (ref 8.4–10.2)
CHLORIDE SERPL-SCNC: 106 MMOL/L (ref 96–108)
CLARITY UR: CLEAR
CO2 SERPL-SCNC: 20 MMOL/L (ref 21–32)
COLOR UR: ABNORMAL
CREAT SERPL-MCNC: 0.49 MG/DL (ref 0.6–1.3)
CREAT UR-MCNC: 96.4 MG/DL
EOSINOPHIL # BLD AUTO: 0.07 THOUSAND/ÂΜL (ref 0–0.61)
EOSINOPHIL NFR BLD AUTO: 1 % (ref 0–6)
ERYTHROCYTE [DISTWIDTH] IN BLOOD BY AUTOMATED COUNT: 14.8 % (ref 11.6–15.1)
GFR SERPL CREATININE-BSD FRML MDRD: 134 ML/MIN/1.73SQ M
GLUCOSE SERPL-MCNC: 118 MG/DL (ref 65–140)
GLUCOSE UR STRIP-MCNC: NEGATIVE MG/DL
HCT VFR BLD AUTO: 36.4 % (ref 34.8–46.1)
HGB BLD-MCNC: 12.6 G/DL (ref 11.5–15.4)
HGB UR QL STRIP.AUTO: NEGATIVE
IMM GRANULOCYTES # BLD AUTO: 0.04 THOUSAND/UL (ref 0–0.2)
IMM GRANULOCYTES NFR BLD AUTO: 1 % (ref 0–2)
KETONES UR STRIP-MCNC: NEGATIVE MG/DL
LEUKOCYTE ESTERASE UR QL STRIP: ABNORMAL
LYMPHOCYTES # BLD AUTO: 1.35 THOUSANDS/ÂΜL (ref 0.6–4.47)
LYMPHOCYTES NFR BLD AUTO: 15 % (ref 14–44)
MCH RBC QN AUTO: 30.8 PG (ref 26.8–34.3)
MCHC RBC AUTO-ENTMCNC: 34.6 G/DL (ref 31.4–37.4)
MCV RBC AUTO: 89 FL (ref 82–98)
MONOCYTES # BLD AUTO: 0.25 THOUSAND/ÂΜL (ref 0.17–1.22)
MONOCYTES NFR BLD AUTO: 3 % (ref 4–12)
MUCOUS THREADS UR QL AUTO: ABNORMAL
NEUTROPHILS # BLD AUTO: 7 THOUSANDS/ÂΜL (ref 1.85–7.62)
NEUTS SEG NFR BLD AUTO: 80 % (ref 43–75)
NITRITE UR QL STRIP: NEGATIVE
NON-SQ EPI CELLS URNS QL MICRO: ABNORMAL /HPF
NRBC BLD AUTO-RTO: 0 /100 WBCS
PH UR STRIP.AUTO: 7 [PH]
PLATELET # BLD AUTO: 318 THOUSANDS/UL (ref 149–390)
PMV BLD AUTO: 9 FL (ref 8.9–12.7)
POTASSIUM SERPL-SCNC: 3.7 MMOL/L (ref 3.5–5.3)
PROT SERPL-MCNC: 6.5 G/DL (ref 6.4–8.4)
PROT UR STRIP-MCNC: NEGATIVE MG/DL
PROT UR-MCNC: 9.1 MG/DL
PROT/CREAT UR: 0.1 MG/G{CREAT}
RBC # BLD AUTO: 4.09 MILLION/UL (ref 3.81–5.12)
RBC #/AREA URNS AUTO: ABNORMAL /HPF
RH BLD: POSITIVE
RUBV IGG SERPL IA-ACNC: 13.4 IU/ML
SODIUM SERPL-SCNC: 135 MMOL/L (ref 135–147)
SP GR UR STRIP.AUTO: 1.02 (ref 1–1.03)
SPECIMEN EXPIRATION DATE: NORMAL
URATE SERPL-MCNC: 3.9 MG/DL (ref 2–7.5)
UROBILINOGEN UR STRIP-ACNC: <2 MG/DL
WBC # BLD AUTO: 8.74 THOUSAND/UL (ref 4.31–10.16)
WBC #/AREA URNS AUTO: ABNORMAL /HPF

## 2025-06-27 PROCEDURE — 86762 RUBELLA ANTIBODY: CPT

## 2025-06-27 PROCEDURE — 87389 HIV-1 AG W/HIV-1&-2 AB AG IA: CPT

## 2025-06-27 PROCEDURE — 84550 ASSAY OF BLOOD/URIC ACID: CPT

## 2025-06-27 PROCEDURE — 82570 ASSAY OF URINE CREATININE: CPT

## 2025-06-27 PROCEDURE — 85025 COMPLETE CBC W/AUTO DIFF WBC: CPT

## 2025-06-27 PROCEDURE — 81001 URINALYSIS AUTO W/SCOPE: CPT

## 2025-06-27 PROCEDURE — 84156 ASSAY OF PROTEIN URINE: CPT

## 2025-06-27 PROCEDURE — T1001 NURSING ASSESSMENT/EVALUATN: HCPCS

## 2025-06-27 PROCEDURE — 86780 TREPONEMA PALLIDUM: CPT

## 2025-06-27 PROCEDURE — 86803 HEPATITIS C AB TEST: CPT

## 2025-06-27 PROCEDURE — 86706 HEP B SURFACE ANTIBODY: CPT

## 2025-06-27 PROCEDURE — 36415 COLL VENOUS BLD VENIPUNCTURE: CPT

## 2025-06-27 PROCEDURE — 86850 RBC ANTIBODY SCREEN: CPT

## 2025-06-27 PROCEDURE — 86901 BLOOD TYPING SEROLOGIC RH(D): CPT

## 2025-06-27 PROCEDURE — 86900 BLOOD TYPING SEROLOGIC ABO: CPT

## 2025-06-27 PROCEDURE — 87340 HEPATITIS B SURFACE AG IA: CPT

## 2025-06-27 PROCEDURE — 87086 URINE CULTURE/COLONY COUNT: CPT

## 2025-06-27 PROCEDURE — 80053 COMPREHEN METABOLIC PANEL: CPT

## 2025-06-27 PROCEDURE — OBC

## 2025-06-27 NOTE — ASSESSMENT & PLAN NOTE
Baseline labs- WNL   Continue low-dose aspirin daily    Orders:    aspirin (Aspirin 81) 81 mg EC tablet; Take 2 tablets (162 mg total) by mouth in the morning

## 2025-06-27 NOTE — ASSESSMENT & PLAN NOTE
- Continue prenatal vitamin daily   - PNC follow up scheduled 25  - Unit regimen reviewed visit every 4 weeks until 28 weeks, every 2 weeks until 36 weeks and then weekly until delivery.   Aware office delivers at [facility]. Reviewed depending on complaint and gestational age may recommend evaluation at [facility]   - Vaccines in Pregnancy       - TDAP vaccine after 27 gestational weeks      - RSV vaccine between 32-36.6 gestational weeks. September- January       - Reviewed with patient  Pregnancy with COVID infection is considered  high risk and can have poor outcome including  delivery, more severe infection.  therefore  pregnant patients are recommended to get  COVID-19 vaccination. Written information provided      - influenza October- March  NA   -  Common discomforts of pregnancy and precautions reviewed.  Signs and symptoms to report reviewed.     Orders:    Chlamydia/GC amplified DNA by PCR    aspirin (Aspirin 81) 81 mg EC tablet; Take 2 tablets (162 mg total) by mouth in the morning     failure, resolved   AF   DELORIS on CKD III, electrolyte imbalances  NSTEMI, Type II  Pressure sores  DMII      Disposition:  Skilled Facility    Labs: For convenience and continuity at follow-up the following most recent labs are provided:      CBC:    Lab Results   Component Value Date    WBC 8.4 07/17/2018    HGB 8.2 07/17/2018    HCT 25.2 07/17/2018     07/17/2018       Renal:    Lab Results   Component Value Date     07/17/2018    K 4.3 07/17/2018    CL 98 07/17/2018    CO2 32 07/17/2018    BUN 80 07/17/2018    CREATININE 1.5 07/17/2018    CALCIUM 9.7 07/17/2018    PHOS 3.4 07/13/2018         Significant Diagnostic Studies    Radiology:   XR FOOT RIGHT (MIN 3 VIEWS)   Final Result      1. No subcutaneous gas or bone destruction identified. 2. Mild degenerative changes of first metatarsal-phalangeal joint. Interpreted by:   Frank Murry MD      Signed by:   Frank Murry MD   7/14/18      Final result      CT CHEST WO CONTRAST   Final Result   Patchy groundglass opacities in all lung fields. Primary differential consideration would be atypical pneumonia   versus interstitial pulmonary edema. CTA HEAD W CONTRAST   Final Result      1. No flow-limiting stenosis or occlusion identified with   limitations described above. 2. Groundglass hazy opacities in both lungs, upper lobe regions   and superior segment lower lobe regions. 3. Previous infarction affecting the right cerebellum and left   thalamus with low-attenuation chronic ischemic changes of the   brain noted            PROCEDURE: CT ANGIOGRAPHY HEAD WITH/WITHOUT CONTRAST      INDICATION: possible stroke      COMPARISON: none      TECHNIQUE: Axial CT imaging obtained through the head prior to and   following administration of IV contrast. Axial images, multiplanar   reformatted images, and maximum intensity projection images were   reviewed for CT angiographic technique. IV contrast: 80 mL Omnipaque 350. lobe regions   and superior segment lower lobe regions. 3. Previous infarction affecting the right cerebellum and left   thalamus with low-attenuation chronic ischemic changes of the   brain noted            PROCEDURE: CT ANGIOGRAPHY HEAD WITH/WITHOUT CONTRAST      INDICATION: possible stroke      COMPARISON: none      TECHNIQUE: Axial CT imaging obtained through the head prior to and   following administration of IV contrast. Axial images, multiplanar   reformatted images, and maximum intensity projection images were   reviewed for CT angiographic technique. IV contrast: 80 mL Omnipaque 350. FINDINGS:      ANTERIOR CIRCULATION: The intracranial internal carotid arteries   reveal some atherosclerotic plaquing in the cavernous carotid   areas with approximately 70 % stenosis on the left side and less   than 50% stenosis on the right side. There is normal patency of the anterior cerebral arteries, and   middle cerebral arteries demonstrate no occlusion or severe   stenosis. No evidence for aneurysm or arteriovenous malformation. POSTERIOR CIRCULATION: The bilateral vertebral arteries, basilar   artery and posterior cerebral arteries demonstrate no occlusion or   stenosis. No evidence for aneurysm or arteriovenous malformation. INTRACRANIAL VENOUS SYSTEM: No evidence for intracranial venous   thrombosis. INTRACRANIAL HEMORRHAGE: None. VENTRICLES: Normal in size and configuration for age. BRAIN PARENCHYMA: Gray-white matter differentiation is normal. No   intracranial mass effect. SKULL: No destructive osseous process or fracture. PARANASAL SINUSES / MASTOIDS: No acute sinusitis or mastoiditis. ORBITS: Normal.      EXTRACRANIAL SOFT TISSUES: Normal.      OTHER: Patchy airspace disease, groundglass airspace disease noted   in both upper lobes and superior segment lower lobes. IMPRESSION:      1. Suboptimal study due to early bolus timing.  There appears to be normal patency of both vertebral arteries with the left vertebral   dominant without dissection or occlusion     2. Approximately 50% stenosis of the right cavernous carotid   artery and 50-70% stenosis on the left without occlusion. Normal   distal branching   3. Prior infarct in the right cerebellum with encephalomalacia and   left thalamus with encephalomalacia as described on prior CTs   without acute hemorrhage      XR CHEST PORTABLE   Final Result      No acute disease. Stable compared to prior study            CT Head WO Contrast (CODE STROKE)   Final Result      Prior right cerebellar infarction with volume loss and   low-attenuation. No acute hemorrhage or hydrocephalus. Moderate cerebral atrophy with previous infarct in left thalamus   noted and encephalomalacia      Soft tissue mass measuring 3 x 2.4 cm in the right occipital and   suboccipital soft tissues with skin thickening, correlate   clinically for area of active mass or infection, see image 16 of   the axial sequences. This is stable compared to prior study,   correlate clinically             Consults:     IP CONSULT TO HOSPITALIST  IP CONSULT TO DIETITIAN  IP CONSULT TO RESPIRATORY CARE  IP CONSULT TO SOCIAL WORK  IP CONSULT TO DIETITIAN  IP CONSULT TO PODIATRY  IP CONSULT TO NEPHROLOGY  IP CONSULT TO CARDIOLOGY  IP CONSULT TO HEART FAILURE NURSE/COORDINATOR    Disposition:  Skilled Nursing Facility     Condition at Discharge: Stable    Discharge Instructions/Follow-up:  Follow up with PCP in one week. Obtain BMP in 5 days and Digoxin level in 3 days. Fax results to PCP.      Code Status:  Full Code     Activity: activity as tolerated    Diet: Tube feeds      Discharge Medications:     Current Discharge Medication List           Details   lisinopril (PRINIVIL;ZESTRIL) 2.5 MG tablet 1 tablet by Per G Tube route daily  Qty: 30 tablet, Refills: 0      insulin glargine (LANTUS) 100 UNIT/ML injection pen Inject 40 Units into the skin examination, evaluation, counseling and review of medications and discharge plan. Signed: Jeanie Calhoun DO   7/17/2018      Thank you Kelli Ni for the opportunity to be involved in this patient's care. If you have any questions or concerns please feel free to contact me at [phone]. Addendum to the Resident  notes. Pt seen,examined and evaluated at bed side with the  resident. I have reviewed the current history, physical findings, labs and assessment and plan and agree with note as documented . Dc to snf today  Cont gentle diuresis at dc  jyotsna gt feeds well        Rito Huffman M.D

## 2025-06-27 NOTE — PATIENT INSTRUCTIONS
Medications and Pregnancy  The following list of over-the-counter medications is usually considered safe to take during pregnancy. Take care to not double up on products containing acetaminophen (Tylenol).   Colds/Sore Throat  Robitussin DM - Plain (guaifenesin)  Saline nasal spray  Warm salt water gargle  Cepacol throat lozenges or mouthwash (cetylpyridinium)  Sucrets (hexylresoricinol)  Allergy  AVOID the “D” - or DECONGESTANT  Claritin (loratadine)  Zrytec (cetirizine)  Allerga (fexofenadine)  Headaches / Aches and Pains  Tylenol (acetaminophen)  Do NOT exceed more than 3000 mg of Tylenol in a 24-hour period.  Heartburn  Mylanta (aluminum hydroxide/simethicone, magnesium hydroxide)  Maalaox (aluminum magnesium hydroxide, magnesium hydroxide)  Tums (calcium carbonate)  Riopan (magaldrate)  Constipation  MiraLAX  Glycerin suppositories  Fleets enema (sodium phosphate & sodium biphosphate)  Nausea/Vomiting  Vitamin B6 (pyridoxine) - May take 50 mg at bedtime, 25 mg in the morning, 25 mg in the afternoon  Unisom (doxylamine) - May use for nausea/vomiting - (cut a 25 mg tablet in half). May cause drowsiness.   Sleep  Benadryl (diphenhydramine) - Take 1-2 tablets as needed at bedtime  Unisom (doxylamine) 25 mg tablet - As needed at bedtime  Melatonin 5 mg tablet - As needed at bedtime    Generally the generic form of medicine is usually lower priced than the brand name form of the medicine.   Talk to your healthcare provider before you take any medicines.  Many medicines may harm your baby if you take them when you are pregnant. Do not take any medicines, vitamins, herbs, or supplements without first talking to your healthcare provider.

## 2025-06-27 NOTE — ASSESSMENT & PLAN NOTE
Pregravid BMI 33.20  Weight gain in pregnancy- Who BMI recommend - 11-20 lb . Healthy diet and daily exercise reviewed and encouraged

## 2025-06-27 NOTE — PROGRESS NOTES
Name: Edwige Jessica      : 2000      MRN: [id number]  Encounter Provider: MAURO Ricks  Encounter Date: 2025   Encounter department: [facility]  :  Assessment & Plan  22 weeks gestation of pregnancy  - Continue prenatal vitamin daily   - PNC follow up scheduled 25  - Unit regimen reviewed visit every 4 weeks until 28 weeks, every 2 weeks until 36 weeks and then weekly until delivery.   Aware office delivers at [facility]. Reviewed depending on complaint and gestational age may recommend evaluation at [facility]   - Vaccines in Pregnancy       - TDAP vaccine after 27 gestational weeks      - RSV vaccine between 32-36.6 gestational weeks. September- January       - Reviewed with patient  Pregnancy with COVID infection is considered  high risk and can have poor outcome including  delivery, more severe infection.  therefore  pregnant patients are recommended to get  COVID-19 vaccination. Written information provided      - influenza October- March  NA   -  Common discomforts of pregnancy and precautions reviewed.  Signs and symptoms to report reviewed.     Orders:    Chlamydia/GC amplified DNA by PCR    aspirin (Aspirin 81) 81 mg EC tablet; Take 2 tablets (162 mg total) by mouth in the morning    Late prenatal care affecting pregnancy in second trimester  Presented for care on  25 @ 21w          Short interval between pregnancies affecting pregnancy, antepartum  Follow up PNC 25    24           Prior pregnancy complicated by PIH, antepartum, second trimester  Baseline labs- WNL   Continue low-dose aspirin daily    Orders:    aspirin (Aspirin 81) 81 mg EC tablet; Take 2 tablets (162 mg total) by mouth in the morning    Obesity in pregnancy, antepartum  Pregravid BMI 33.20  Weight gain in pregnancy- Who BMI recommend - 11-20 lb . Healthy diet and daily exercise reviewed and encouraged          Rubella non-immune status,  antepartum  Recommend MMR postpartum       Nonimmune to hepatitis B virus  Recommend hepatitis B booster during pregnancy       Screening examination for STI  We will call/ShuttleCloudhart message with results  Orders:    Chlamydia/GC amplified DNA by PCR    Cervical cancer screening declined  Desires Pap smear after delivery         RTO 4 weeks     History of Present Illness   HPI  Edwige Jessica is a 25 y.o. female who presents for Prenatal H&P        Denies loss of fluid, vaginal discharge, vaginal bleeding  and abdominal pain.  Confirms fetal movement. Tolerating PNV well.   Prenatal panel reviewed -WNL.  Unplanned  but welcomed pregnancy.      History obtained from: patient    Review of Systems   Constitutional: Negative.    Respiratory: Negative.     Cardiovascular: Negative.    Genitourinary:  Positive for menstrual problem.     Medical History Reviewed by provider this encounter:  Tobacco  Allergies  Meds  Med Hx  Surg Hx  Fam Hx  Soc Hx    .     Objective   /72   Wt 104 kg (228 lb 6.4 oz)   LMP 2025 (Approximate)   BMI 33.73 kg/m²      OB history:      G1- 23  term female 7lb; denies complications     G2- 24  term female 7lb 2.6oz; denies complications     G3- current           Dating:      LMP - 25 MATT 25      US on 25 @  22w MATT 10/30/25   Working MATT 10/30/25 per MFM second trimester ultrasound      Surgical history:      Past Surgical History[1]    Medical History:      Past Medical History[2]    Social history:      Alcohol/ tobacco/ illicit drug social alcohol use prior to pregnancy/denies tobacco or drug use     Denies history of STD/STI      Work/ housing/ support Home/ house- stable/ FOB, family and friends supportive     PCP/ Dental last PE  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

## 2025-06-28 LAB
BACTERIA UR CULT: NORMAL
HBV SURFACE AB SER-ACNC: <3 MIU/ML
HBV SURFACE AG SER QL: NORMAL
HCV AB SER QL: NORMAL
HIV 1+2 AB+HIV1 P24 AG SERPL QL IA: NORMAL
TREPONEMA PALLIDUM IGG+IGM AB [PRESENCE] IN SERUM OR PLASMA BY IMMUNOASSAY: NORMAL

## 2025-06-30 ENCOUNTER — INITIAL PRENATAL (OUTPATIENT)
Dept: OBGYN CLINIC | Facility: CLINIC | Age: 25
End: 2025-06-30
Payer: COMMERCIAL

## 2025-06-30 VITALS — BODY MASS INDEX: 33.73 KG/M2 | WEIGHT: 228.4 LBS | DIASTOLIC BLOOD PRESSURE: 72 MMHG | SYSTOLIC BLOOD PRESSURE: 118 MMHG

## 2025-06-30 DIAGNOSIS — Z28.39 RUBELLA NON-IMMUNE STATUS, ANTEPARTUM: ICD-10-CM

## 2025-06-30 DIAGNOSIS — Z3A.22 22 WEEKS GESTATION OF PREGNANCY: ICD-10-CM

## 2025-06-30 DIAGNOSIS — Z78.9 NONIMMUNE TO HEPATITIS B VIRUS: ICD-10-CM

## 2025-06-30 DIAGNOSIS — O09.892 PRIOR PREGNANCY COMPLICATED BY PIH, ANTEPARTUM, SECOND TRIMESTER: ICD-10-CM

## 2025-06-30 DIAGNOSIS — O99.210 OBESITY IN PREGNANCY, ANTEPARTUM: ICD-10-CM

## 2025-06-30 DIAGNOSIS — O09.32 LATE PRENATAL CARE AFFECTING PREGNANCY IN SECOND TRIMESTER: Primary | ICD-10-CM

## 2025-06-30 DIAGNOSIS — Z11.3 SCREENING EXAMINATION FOR STI: ICD-10-CM

## 2025-06-30 DIAGNOSIS — O09.899 RUBELLA NON-IMMUNE STATUS, ANTEPARTUM: ICD-10-CM

## 2025-06-30 DIAGNOSIS — O09.899 SHORT INTERVAL BETWEEN PREGNANCIES AFFECTING PREGNANCY, ANTEPARTUM: ICD-10-CM

## 2025-06-30 DIAGNOSIS — Z53.20 CERVICAL CANCER SCREENING DECLINED: ICD-10-CM

## 2025-06-30 PROCEDURE — 99213 OFFICE O/P EST LOW 20 MIN: CPT | Performed by: NURSE PRACTITIONER

## 2025-06-30 PROCEDURE — 87491 CHLMYD TRACH DNA AMP PROBE: CPT | Performed by: NURSE PRACTITIONER

## 2025-06-30 PROCEDURE — 87591 N.GONORRHOEAE DNA AMP PROB: CPT | Performed by: NURSE PRACTITIONER

## 2025-06-30 RX ORDER — ASPIRIN 81 MG/1
162 TABLET ORAL DAILY
Qty: 188 TABLET | Refills: 0 | Status: SHIPPED | OUTPATIENT
Start: 2025-06-30 | End: 2025-10-02

## 2025-07-01 ENCOUNTER — TELEPHONE (OUTPATIENT)
Dept: OBGYN CLINIC | Facility: MEDICAL CENTER | Age: 25
End: 2025-07-01

## 2025-07-01 LAB
C TRACH DNA SPEC QL NAA+PROBE: NEGATIVE
N GONORRHOEA DNA SPEC QL NAA+PROBE: NEGATIVE

## 2025-07-01 NOTE — TELEPHONE ENCOUNTER
Who called:STAFF     Is the patient Pregnant ? Yes  If so, How many weeks?     Reason for the Call: Called patient to schedule her 24 and 29 wk appt    Action Taken: LVM    Outcome/Plan/ Recommendations: Called patient to schedule her 24 and 29 wk appt

## 2025-07-01 NOTE — TELEPHONE ENCOUNTER
Who called:STAFF     Is the patient Pregnant ? yes  If so, How many weeks?     Reason for the Call: Called patient  2 times today to schedule appt's    Action Taken: LVM to call us back    Outcome/Plan/ Recommendations: Called patient  2 times today to schedule appt's